# Patient Record
Sex: FEMALE | Race: WHITE | NOT HISPANIC OR LATINO | Employment: OTHER | URBAN - METROPOLITAN AREA
[De-identification: names, ages, dates, MRNs, and addresses within clinical notes are randomized per-mention and may not be internally consistent; named-entity substitution may affect disease eponyms.]

---

## 2018-05-22 ENCOUNTER — LAB VISIT (OUTPATIENT)
Dept: LAB | Facility: HOSPITAL | Age: 78
End: 2018-05-22
Attending: INTERNAL MEDICINE
Payer: MEDICARE

## 2018-05-22 DIAGNOSIS — I51.9 MYXEDEMA HEART DISEASE: ICD-10-CM

## 2018-05-22 DIAGNOSIS — E11.9 DIABETES MELLITUS WITHOUT COMPLICATION: Primary | ICD-10-CM

## 2018-05-22 DIAGNOSIS — E03.9 MYXEDEMA HEART DISEASE: ICD-10-CM

## 2018-05-22 DIAGNOSIS — I10 ESSENTIAL HYPERTENSION, MALIGNANT: ICD-10-CM

## 2018-05-22 LAB
ALBUMIN SERPL BCP-MCNC: 3.1 G/DL
ALP SERPL-CCNC: 95 U/L
ALT SERPL W/O P-5'-P-CCNC: 14 U/L
ANION GAP SERPL CALC-SCNC: 6 MMOL/L
AST SERPL-CCNC: 18 U/L
BILIRUB SERPL-MCNC: 0.3 MG/DL
BUN SERPL-MCNC: 16 MG/DL
CALCIUM SERPL-MCNC: 8.6 MG/DL
CHLORIDE SERPL-SCNC: 104 MMOL/L
CO2 SERPL-SCNC: 26 MMOL/L
CREAT SERPL-MCNC: 1.2 MG/DL
ERYTHROCYTE [DISTWIDTH] IN BLOOD BY AUTOMATED COUNT: 14.6 %
EST. GFR  (AFRICAN AMERICAN): 50.4 ML/MIN/1.73 M^2
EST. GFR  (NON AFRICAN AMERICAN): 43.7 ML/MIN/1.73 M^2
ESTIMATED AVG GLUCOSE: 140 MG/DL
GLUCOSE SERPL-MCNC: 199 MG/DL
HBA1C MFR BLD HPLC: 6.5 %
HCT VFR BLD AUTO: 39.8 %
HGB BLD-MCNC: 12.5 G/DL
MCH RBC QN AUTO: 28.9 PG
MCHC RBC AUTO-ENTMCNC: 31.4 G/DL
MCV RBC AUTO: 92 FL
PLATELET # BLD AUTO: 234 K/UL
PMV BLD AUTO: 10.8 FL
POTASSIUM SERPL-SCNC: 3.6 MMOL/L
PROT SERPL-MCNC: 6.6 G/DL
RBC # BLD AUTO: 4.32 M/UL
SODIUM SERPL-SCNC: 136 MMOL/L
T4 FREE SERPL-MCNC: 0.59 NG/DL
TSH SERPL DL<=0.005 MIU/L-ACNC: 30.88 UIU/ML
WBC # BLD AUTO: 9.14 K/UL

## 2018-05-22 PROCEDURE — 84439 ASSAY OF FREE THYROXINE: CPT

## 2018-05-22 PROCEDURE — 36415 COLL VENOUS BLD VENIPUNCTURE: CPT

## 2018-05-22 PROCEDURE — 85027 COMPLETE CBC AUTOMATED: CPT

## 2018-05-22 PROCEDURE — 83036 HEMOGLOBIN GLYCOSYLATED A1C: CPT

## 2018-05-22 PROCEDURE — 84443 ASSAY THYROID STIM HORMONE: CPT

## 2018-05-22 PROCEDURE — 80053 COMPREHEN METABOLIC PANEL: CPT

## 2018-06-26 ENCOUNTER — APPOINTMENT (OUTPATIENT)
Dept: LAB | Facility: HOSPITAL | Age: 78
End: 2018-06-26
Attending: INTERNAL MEDICINE
Payer: MEDICARE

## 2018-06-26 DIAGNOSIS — N39.0 URINARY TRACT INFECTION, SITE NOT SPECIFIED: Primary | ICD-10-CM

## 2018-06-26 LAB
BILIRUB UR QL STRIP: NEGATIVE
CLARITY UR: CLEAR
COLOR UR: YELLOW
GLUCOSE UR QL STRIP: NEGATIVE
HGB UR QL STRIP: NEGATIVE
KETONES UR QL STRIP: NEGATIVE
LEUKOCYTE ESTERASE UR QL STRIP: NEGATIVE
NITRITE UR QL STRIP: NEGATIVE
PH UR STRIP: 6 [PH] (ref 5–8)
PROT UR QL STRIP: NEGATIVE
SP GR UR STRIP: 1.01 (ref 1–1.03)
URN SPEC COLLECT METH UR: NORMAL
UROBILINOGEN UR STRIP-ACNC: NEGATIVE EU/DL

## 2018-06-26 PROCEDURE — 81003 URINALYSIS AUTO W/O SCOPE: CPT

## 2018-06-26 PROCEDURE — 87086 URINE CULTURE/COLONY COUNT: CPT

## 2018-06-27 LAB — BACTERIA UR CULT: NO GROWTH

## 2018-07-05 ENCOUNTER — LAB VISIT (OUTPATIENT)
Dept: LAB | Facility: HOSPITAL | Age: 78
End: 2018-07-05
Attending: INTERNAL MEDICINE
Payer: MEDICARE

## 2018-07-05 DIAGNOSIS — E03.9 MYXEDEMA HEART DISEASE: ICD-10-CM

## 2018-07-05 DIAGNOSIS — I51.9 MYXEDEMA HEART DISEASE: ICD-10-CM

## 2018-07-05 DIAGNOSIS — I48.0 PAROXYSMAL ATRIAL FIBRILLATION: Primary | ICD-10-CM

## 2018-07-05 DIAGNOSIS — I10 ESSENTIAL HYPERTENSION, MALIGNANT: ICD-10-CM

## 2018-07-05 LAB
ALBUMIN SERPL BCP-MCNC: 3.2 G/DL
ALP SERPL-CCNC: 92 U/L
ALT SERPL W/O P-5'-P-CCNC: 11 U/L
ANION GAP SERPL CALC-SCNC: 9 MMOL/L
AST SERPL-CCNC: 18 U/L
BILIRUB SERPL-MCNC: 0.6 MG/DL
BNP SERPL-MCNC: 55 PG/ML
BUN SERPL-MCNC: 17 MG/DL
CALCIUM SERPL-MCNC: 9.2 MG/DL
CHLORIDE SERPL-SCNC: 105 MMOL/L
CO2 SERPL-SCNC: 26 MMOL/L
CREAT SERPL-MCNC: 1.4 MG/DL
EST. GFR  (AFRICAN AMERICAN): 41.8 ML/MIN/1.73 M^2
EST. GFR  (NON AFRICAN AMERICAN): 36.3 ML/MIN/1.73 M^2
GLUCOSE SERPL-MCNC: 176 MG/DL
POTASSIUM SERPL-SCNC: 3.7 MMOL/L
PROT SERPL-MCNC: 6.8 G/DL
SODIUM SERPL-SCNC: 140 MMOL/L
T4 FREE SERPL-MCNC: 0.73 NG/DL
TSH SERPL DL<=0.005 MIU/L-ACNC: 41.71 UIU/ML

## 2018-07-05 PROCEDURE — 84443 ASSAY THYROID STIM HORMONE: CPT

## 2018-07-05 PROCEDURE — 83880 ASSAY OF NATRIURETIC PEPTIDE: CPT

## 2018-07-05 PROCEDURE — 36415 COLL VENOUS BLD VENIPUNCTURE: CPT

## 2018-07-05 PROCEDURE — 80053 COMPREHEN METABOLIC PANEL: CPT

## 2018-07-05 PROCEDURE — 84439 ASSAY OF FREE THYROXINE: CPT

## 2018-07-19 ENCOUNTER — LAB VISIT (OUTPATIENT)
Dept: LAB | Facility: HOSPITAL | Age: 78
End: 2018-07-19
Attending: NURSE PRACTITIONER
Payer: MEDICARE

## 2018-07-19 DIAGNOSIS — I48.0 PAROXYSMAL ATRIAL FIBRILLATION: ICD-10-CM

## 2018-07-19 DIAGNOSIS — I10 ESSENTIAL HYPERTENSION, MALIGNANT: ICD-10-CM

## 2018-07-19 DIAGNOSIS — E11.9 DIABETES MELLITUS WITHOUT COMPLICATION: Primary | ICD-10-CM

## 2018-07-19 LAB
ERYTHROCYTE [DISTWIDTH] IN BLOOD BY AUTOMATED COUNT: 15.5 %
HCT VFR BLD AUTO: 40 %
HGB BLD-MCNC: 12.6 G/DL
MCH RBC QN AUTO: 28.8 PG
MCHC RBC AUTO-ENTMCNC: 31.5 G/DL
MCV RBC AUTO: 91 FL
PLATELET # BLD AUTO: 234 K/UL
PMV BLD AUTO: 10.8 FL
RBC # BLD AUTO: 4.38 M/UL
WBC # BLD AUTO: 12.3 K/UL

## 2018-07-19 PROCEDURE — 36415 COLL VENOUS BLD VENIPUNCTURE: CPT

## 2018-07-19 PROCEDURE — 85027 COMPLETE CBC AUTOMATED: CPT

## 2018-08-23 ENCOUNTER — LAB VISIT (OUTPATIENT)
Dept: LAB | Facility: HOSPITAL | Age: 78
End: 2018-08-23
Attending: INTERNAL MEDICINE
Payer: MEDICARE

## 2018-08-23 DIAGNOSIS — E11.9 DIABETES MELLITUS WITHOUT COMPLICATION: ICD-10-CM

## 2018-08-23 DIAGNOSIS — E78.5 HYPERLIPEMIA: Primary | ICD-10-CM

## 2018-08-23 DIAGNOSIS — I10 ESSENTIAL HYPERTENSION, MALIGNANT: ICD-10-CM

## 2018-08-23 LAB
ALBUMIN SERPL BCP-MCNC: 2.8 G/DL
ALP SERPL-CCNC: 78 U/L
ALT SERPL W/O P-5'-P-CCNC: 10 U/L
ANION GAP SERPL CALC-SCNC: 5 MMOL/L
AST SERPL-CCNC: 14 U/L
BILIRUB DIRECT SERPL-MCNC: 0.1 MG/DL
BILIRUB SERPL-MCNC: 0.6 MG/DL
BUN SERPL-MCNC: 18 MG/DL
CALCIUM SERPL-MCNC: 8.6 MG/DL
CHLORIDE SERPL-SCNC: 105 MMOL/L
CHOLEST SERPL-MCNC: 131 MG/DL
CHOLEST/HDLC SERPL: 3.2 {RATIO}
CO2 SERPL-SCNC: 27 MMOL/L
CREAT SERPL-MCNC: 1.4 MG/DL
EST. GFR  (AFRICAN AMERICAN): 41.8 ML/MIN/1.73 M^2
EST. GFR  (NON AFRICAN AMERICAN): 36.3 ML/MIN/1.73 M^2
ESTIMATED AVG GLUCOSE: 140 MG/DL
GLUCOSE SERPL-MCNC: 97 MG/DL
HBA1C MFR BLD HPLC: 6.5 %
HDLC SERPL-MCNC: 41 MG/DL
HDLC SERPL: 31.3 %
LDLC SERPL CALC-MCNC: 67 MG/DL
NONHDLC SERPL-MCNC: 90 MG/DL
POTASSIUM SERPL-SCNC: 3.6 MMOL/L
PROT SERPL-MCNC: 6.1 G/DL
SODIUM SERPL-SCNC: 137 MMOL/L
TRIGL SERPL-MCNC: 115 MG/DL

## 2018-08-23 PROCEDURE — 80076 HEPATIC FUNCTION PANEL: CPT

## 2018-08-23 PROCEDURE — 36415 COLL VENOUS BLD VENIPUNCTURE: CPT

## 2018-08-23 PROCEDURE — 80061 LIPID PANEL: CPT

## 2018-08-23 PROCEDURE — 83036 HEMOGLOBIN GLYCOSYLATED A1C: CPT

## 2018-08-23 PROCEDURE — 80048 BASIC METABOLIC PNL TOTAL CA: CPT

## 2018-09-04 ENCOUNTER — APPOINTMENT (OUTPATIENT)
Dept: LAB | Facility: HOSPITAL | Age: 78
End: 2018-09-04
Attending: NURSE PRACTITIONER
Payer: MEDICARE

## 2018-09-04 LAB — VIT B12 SERPL-MCNC: 716 PG/ML

## 2018-09-04 PROCEDURE — 36415 COLL VENOUS BLD VENIPUNCTURE: CPT

## 2018-09-04 PROCEDURE — 82607 VITAMIN B-12: CPT

## 2018-09-06 ENCOUNTER — LAB VISIT (OUTPATIENT)
Dept: LAB | Facility: HOSPITAL | Age: 78
End: 2018-09-06
Attending: INTERNAL MEDICINE
Payer: MEDICARE

## 2018-09-06 DIAGNOSIS — E55.9 AVITAMINOSIS D: Primary | ICD-10-CM

## 2018-09-06 LAB — 25(OH)D3+25(OH)D2 SERPL-MCNC: 32 NG/ML

## 2018-09-06 PROCEDURE — 82306 VITAMIN D 25 HYDROXY: CPT

## 2018-09-06 PROCEDURE — 36415 COLL VENOUS BLD VENIPUNCTURE: CPT

## 2018-10-09 ENCOUNTER — LAB VISIT (OUTPATIENT)
Dept: LAB | Facility: HOSPITAL | Age: 78
End: 2018-10-09
Attending: INTERNAL MEDICINE
Payer: MEDICARE

## 2018-10-09 DIAGNOSIS — E03.9 MYXEDEMA HEART DISEASE: Primary | ICD-10-CM

## 2018-10-09 DIAGNOSIS — I51.9 MYXEDEMA HEART DISEASE: Primary | ICD-10-CM

## 2018-10-09 LAB
T3 SERPL-MCNC: 73 NG/DL
T4 FREE SERPL-MCNC: 0.99 NG/DL
TSH SERPL DL<=0.005 MIU/L-ACNC: 5.31 UIU/ML

## 2018-10-09 PROCEDURE — 84443 ASSAY THYROID STIM HORMONE: CPT

## 2018-10-09 PROCEDURE — 84480 ASSAY TRIIODOTHYRONINE (T3): CPT

## 2018-10-09 PROCEDURE — 36415 COLL VENOUS BLD VENIPUNCTURE: CPT

## 2018-10-09 PROCEDURE — 84439 ASSAY OF FREE THYROXINE: CPT

## 2018-11-27 ENCOUNTER — LAB VISIT (OUTPATIENT)
Dept: LAB | Facility: HOSPITAL | Age: 78
End: 2018-11-27
Attending: INTERNAL MEDICINE
Payer: MEDICARE

## 2018-11-27 DIAGNOSIS — E11.9 DIABETES MELLITUS WITHOUT COMPLICATION: Primary | ICD-10-CM

## 2018-11-27 DIAGNOSIS — I10 ESSENTIAL HYPERTENSION, MALIGNANT: ICD-10-CM

## 2018-11-27 LAB
ANION GAP SERPL CALC-SCNC: 8 MMOL/L
BUN SERPL-MCNC: 19 MG/DL
CALCIUM SERPL-MCNC: 8.9 MG/DL
CHLORIDE SERPL-SCNC: 105 MMOL/L
CO2 SERPL-SCNC: 26 MMOL/L
CREAT SERPL-MCNC: 1.2 MG/DL
EST. GFR  (AFRICAN AMERICAN): 50.4 ML/MIN/1.73 M^2
EST. GFR  (NON AFRICAN AMERICAN): 43.7 ML/MIN/1.73 M^2
ESTIMATED AVG GLUCOSE: 137 MG/DL
GLUCOSE SERPL-MCNC: 114 MG/DL
HBA1C MFR BLD HPLC: 6.4 %
POTASSIUM SERPL-SCNC: 4 MMOL/L
SODIUM SERPL-SCNC: 139 MMOL/L

## 2018-11-27 PROCEDURE — 36415 COLL VENOUS BLD VENIPUNCTURE: CPT

## 2018-11-27 PROCEDURE — 83036 HEMOGLOBIN GLYCOSYLATED A1C: CPT

## 2018-11-27 PROCEDURE — 80048 BASIC METABOLIC PNL TOTAL CA: CPT

## 2019-02-21 ENCOUNTER — LAB VISIT (OUTPATIENT)
Dept: LAB | Facility: HOSPITAL | Age: 79
End: 2019-02-21
Attending: INTERNAL MEDICINE
Payer: MEDICARE

## 2019-02-21 DIAGNOSIS — E11.40 DIABETIC NEUROPATHY: ICD-10-CM

## 2019-02-21 DIAGNOSIS — I10 ESSENTIAL HYPERTENSION, MALIGNANT: ICD-10-CM

## 2019-02-21 DIAGNOSIS — E78.5 HYPERLIPEMIA: ICD-10-CM

## 2019-02-21 DIAGNOSIS — I48.0 PAROXYSMAL ATRIAL FIBRILLATION: Primary | ICD-10-CM

## 2019-02-21 LAB
ALBUMIN SERPL BCP-MCNC: 3.1 G/DL
ALP SERPL-CCNC: 89 U/L
ALT SERPL W/O P-5'-P-CCNC: 14 U/L
ANION GAP SERPL CALC-SCNC: 12 MMOL/L
AST SERPL-CCNC: 16 U/L
BILIRUB DIRECT SERPL-MCNC: 0.1 MG/DL
BILIRUB SERPL-MCNC: 0.5 MG/DL
BUN SERPL-MCNC: 20 MG/DL
CALCIUM SERPL-MCNC: 8.6 MG/DL
CHLORIDE SERPL-SCNC: 104 MMOL/L
CHOLEST SERPL-MCNC: 146 MG/DL
CHOLEST/HDLC SERPL: 3.4 {RATIO}
CO2 SERPL-SCNC: 24 MMOL/L
CREAT SERPL-MCNC: 1.2 MG/DL
EST. GFR  (AFRICAN AMERICAN): 50.4 ML/MIN/1.73 M^2
EST. GFR  (NON AFRICAN AMERICAN): 43.7 ML/MIN/1.73 M^2
ESTIMATED AVG GLUCOSE: 143 MG/DL
GLUCOSE SERPL-MCNC: 168 MG/DL
HBA1C MFR BLD HPLC: 6.6 %
HDLC SERPL-MCNC: 43 MG/DL
HDLC SERPL: 29.5 %
LDLC SERPL CALC-MCNC: 82.4 MG/DL
NONHDLC SERPL-MCNC: 103 MG/DL
POTASSIUM SERPL-SCNC: 3.3 MMOL/L
PROT SERPL-MCNC: 6.8 G/DL
SODIUM SERPL-SCNC: 140 MMOL/L
TRIGL SERPL-MCNC: 103 MG/DL

## 2019-02-21 PROCEDURE — 83036 HEMOGLOBIN GLYCOSYLATED A1C: CPT

## 2019-02-21 PROCEDURE — 36415 COLL VENOUS BLD VENIPUNCTURE: CPT

## 2019-02-21 PROCEDURE — 80061 LIPID PANEL: CPT

## 2019-02-21 PROCEDURE — 80048 BASIC METABOLIC PNL TOTAL CA: CPT

## 2019-02-21 PROCEDURE — 80076 HEPATIC FUNCTION PANEL: CPT

## 2019-02-28 ENCOUNTER — LAB VISIT (OUTPATIENT)
Dept: LAB | Facility: HOSPITAL | Age: 79
End: 2019-02-28
Attending: INTERNAL MEDICINE
Payer: MEDICARE

## 2019-02-28 DIAGNOSIS — E78.6 FAMILIAL LIPOPROTEIN DEFICIENCY: Primary | ICD-10-CM

## 2019-02-28 LAB
ANION GAP SERPL CALC-SCNC: 9 MMOL/L
BUN SERPL-MCNC: 21 MG/DL
CALCIUM SERPL-MCNC: 8.8 MG/DL
CHLORIDE SERPL-SCNC: 105 MMOL/L
CO2 SERPL-SCNC: 26 MMOL/L
CREAT SERPL-MCNC: 1.2 MG/DL
EST. GFR  (AFRICAN AMERICAN): 50.4 ML/MIN/1.73 M^2
EST. GFR  (NON AFRICAN AMERICAN): 43.7 ML/MIN/1.73 M^2
GLUCOSE SERPL-MCNC: 112 MG/DL
POTASSIUM SERPL-SCNC: 3.7 MMOL/L
SODIUM SERPL-SCNC: 140 MMOL/L

## 2019-02-28 PROCEDURE — 80048 BASIC METABOLIC PNL TOTAL CA: CPT

## 2019-02-28 PROCEDURE — 36415 COLL VENOUS BLD VENIPUNCTURE: CPT

## 2019-04-16 ENCOUNTER — LAB VISIT (OUTPATIENT)
Dept: LAB | Facility: HOSPITAL | Age: 79
End: 2019-04-16
Attending: INTERNAL MEDICINE
Payer: MEDICARE

## 2019-04-16 DIAGNOSIS — I48.0 PAROXYSMAL ATRIAL FIBRILLATION: Primary | ICD-10-CM

## 2019-04-16 DIAGNOSIS — N18.9 CHRONIC KIDNEY DISEASE, UNSPECIFIED: ICD-10-CM

## 2019-04-16 DIAGNOSIS — M48.02 SPINAL STENOSIS IN CERVICAL REGION: ICD-10-CM

## 2019-04-16 LAB
ALBUMIN SERPL BCP-MCNC: 2.9 G/DL (ref 3.5–5.2)
ALP SERPL-CCNC: 84 U/L (ref 55–135)
ALT SERPL W/O P-5'-P-CCNC: 12 U/L (ref 10–44)
ANION GAP SERPL CALC-SCNC: 10 MMOL/L (ref 8–16)
AST SERPL-CCNC: 14 U/L (ref 10–40)
BASOPHILS # BLD AUTO: 0.06 K/UL (ref 0–0.2)
BASOPHILS NFR BLD: 0.7 % (ref 0–1.9)
BILIRUB SERPL-MCNC: 0.4 MG/DL (ref 0.1–1)
BUN SERPL-MCNC: 16 MG/DL (ref 8–23)
CALCIUM SERPL-MCNC: 8.5 MG/DL (ref 8.7–10.5)
CHLORIDE SERPL-SCNC: 105 MMOL/L (ref 95–110)
CO2 SERPL-SCNC: 24 MMOL/L (ref 23–29)
CREAT SERPL-MCNC: 1.1 MG/DL (ref 0.5–1.4)
DIFFERENTIAL METHOD: ABNORMAL
EOSINOPHIL # BLD AUTO: 0.3 K/UL (ref 0–0.5)
EOSINOPHIL NFR BLD: 3.1 % (ref 0–8)
ERYTHROCYTE [DISTWIDTH] IN BLOOD BY AUTOMATED COUNT: 14 % (ref 11.5–14.5)
EST. GFR  (AFRICAN AMERICAN): 56 ML/MIN/1.73 M^2
EST. GFR  (NON AFRICAN AMERICAN): 48.5 ML/MIN/1.73 M^2
GLUCOSE SERPL-MCNC: 100 MG/DL (ref 70–110)
HCT VFR BLD AUTO: 39.8 % (ref 37–48.5)
HGB BLD-MCNC: 12.2 G/DL (ref 12–16)
IMM GRANULOCYTES # BLD AUTO: 0.03 K/UL (ref 0–0.04)
IMM GRANULOCYTES NFR BLD AUTO: 0.3 % (ref 0–0.5)
LYMPHOCYTES # BLD AUTO: 3.1 K/UL (ref 1–4.8)
LYMPHOCYTES NFR BLD: 33.3 % (ref 18–48)
MAGNESIUM SERPL-MCNC: 2.1 MG/DL (ref 1.6–2.6)
MCH RBC QN AUTO: 28.6 PG (ref 27–31)
MCHC RBC AUTO-ENTMCNC: 30.7 G/DL (ref 32–36)
MCV RBC AUTO: 93 FL (ref 82–98)
MONOCYTES # BLD AUTO: 0.7 K/UL (ref 0.3–1)
MONOCYTES NFR BLD: 7.7 % (ref 4–15)
NEUTROPHILS # BLD AUTO: 5 K/UL (ref 1.8–7.7)
NEUTROPHILS NFR BLD: 54.9 % (ref 38–73)
NRBC BLD-RTO: 0 /100 WBC
PLATELET # BLD AUTO: 217 K/UL (ref 150–350)
PMV BLD AUTO: 11.1 FL (ref 9.2–12.9)
POTASSIUM SERPL-SCNC: 3.8 MMOL/L (ref 3.5–5.1)
PROT SERPL-MCNC: 6.3 G/DL (ref 6–8.4)
RBC # BLD AUTO: 4.26 M/UL (ref 4–5.4)
SODIUM SERPL-SCNC: 139 MMOL/L (ref 136–145)
WBC # BLD AUTO: 9.17 K/UL (ref 3.9–12.7)

## 2019-04-16 PROCEDURE — 36415 COLL VENOUS BLD VENIPUNCTURE: CPT

## 2019-04-16 PROCEDURE — 80053 COMPREHEN METABOLIC PANEL: CPT

## 2019-04-16 PROCEDURE — 83735 ASSAY OF MAGNESIUM: CPT

## 2019-04-16 PROCEDURE — 85025 COMPLETE CBC W/AUTO DIFF WBC: CPT

## 2019-04-23 ENCOUNTER — LAB VISIT (OUTPATIENT)
Dept: LAB | Facility: HOSPITAL | Age: 79
End: 2019-04-23
Attending: INTERNAL MEDICINE
Payer: MEDICARE

## 2019-04-23 DIAGNOSIS — E03.1 CONGENITAL HYPOTHYROIDISM: Primary | ICD-10-CM

## 2019-04-23 LAB
T4 FREE SERPL-MCNC: 1.25 NG/DL (ref 0.71–1.51)
TSH SERPL DL<=0.005 MIU/L-ACNC: 0.28 UIU/ML (ref 0.34–5.6)

## 2019-04-23 PROCEDURE — 84443 ASSAY THYROID STIM HORMONE: CPT

## 2019-04-23 PROCEDURE — 36415 COLL VENOUS BLD VENIPUNCTURE: CPT

## 2019-04-23 PROCEDURE — 84439 ASSAY OF FREE THYROXINE: CPT

## 2019-05-09 ENCOUNTER — LAB VISIT (OUTPATIENT)
Dept: LAB | Facility: HOSPITAL | Age: 79
End: 2019-05-09
Attending: INTERNAL MEDICINE
Payer: MEDICARE

## 2019-05-09 DIAGNOSIS — E11.9 DIABETES MELLITUS WITHOUT COMPLICATION: ICD-10-CM

## 2019-05-09 DIAGNOSIS — I10 ESSENTIAL HYPERTENSION, MALIGNANT: Primary | ICD-10-CM

## 2019-05-09 DIAGNOSIS — D63.8 CHRONIC DISEASE ANEMIA: ICD-10-CM

## 2019-05-09 LAB
ALBUMIN SERPL BCP-MCNC: 2.8 G/DL (ref 3.5–5.2)
ALP SERPL-CCNC: 96 U/L (ref 55–135)
ALT SERPL W/O P-5'-P-CCNC: 15 U/L (ref 10–44)
ANION GAP SERPL CALC-SCNC: 8 MMOL/L (ref 8–16)
AST SERPL-CCNC: 14 U/L (ref 10–40)
BILIRUB SERPL-MCNC: 0.4 MG/DL (ref 0.1–1)
BUN SERPL-MCNC: 11 MG/DL (ref 8–23)
CALCIUM SERPL-MCNC: 8.4 MG/DL (ref 8.7–10.5)
CHLORIDE SERPL-SCNC: 103 MMOL/L (ref 95–110)
CO2 SERPL-SCNC: 29 MMOL/L (ref 23–29)
CREAT SERPL-MCNC: 1.2 MG/DL (ref 0.5–1.4)
ERYTHROCYTE [DISTWIDTH] IN BLOOD BY AUTOMATED COUNT: 14 % (ref 11.5–14.5)
EST. GFR  (AFRICAN AMERICAN): 50.4 ML/MIN/1.73 M^2
EST. GFR  (NON AFRICAN AMERICAN): 43.7 ML/MIN/1.73 M^2
ESTIMATED AVG GLUCOSE: 137 MG/DL (ref 68–131)
GLUCOSE SERPL-MCNC: 111 MG/DL (ref 70–110)
HBA1C MFR BLD HPLC: 6.4 % (ref 4.5–6.2)
HCT VFR BLD AUTO: 40.4 % (ref 37–48.5)
HGB BLD-MCNC: 12.5 G/DL (ref 12–16)
MCH RBC QN AUTO: 28.6 PG (ref 27–31)
MCHC RBC AUTO-ENTMCNC: 30.9 G/DL (ref 32–36)
MCV RBC AUTO: 92 FL (ref 82–98)
PLATELET # BLD AUTO: 234 K/UL (ref 150–350)
PMV BLD AUTO: 10.6 FL (ref 9.2–12.9)
POTASSIUM SERPL-SCNC: 3.3 MMOL/L (ref 3.5–5.1)
PROT SERPL-MCNC: 6.3 G/DL (ref 6–8.4)
RBC # BLD AUTO: 4.37 M/UL (ref 4–5.4)
SODIUM SERPL-SCNC: 140 MMOL/L (ref 136–145)
WBC # BLD AUTO: 8.76 K/UL (ref 3.9–12.7)

## 2019-05-09 PROCEDURE — 85027 COMPLETE CBC AUTOMATED: CPT

## 2019-05-09 PROCEDURE — 80053 COMPREHEN METABOLIC PANEL: CPT

## 2019-05-09 PROCEDURE — 83036 HEMOGLOBIN GLYCOSYLATED A1C: CPT

## 2019-05-09 PROCEDURE — 36415 COLL VENOUS BLD VENIPUNCTURE: CPT

## 2019-05-16 ENCOUNTER — LAB VISIT (OUTPATIENT)
Dept: LAB | Facility: HOSPITAL | Age: 79
End: 2019-05-16
Attending: NURSE PRACTITIONER
Payer: MEDICARE

## 2019-05-16 DIAGNOSIS — E87.1 HYPOSMOLALITY SYNDROME: Primary | ICD-10-CM

## 2019-05-16 LAB
ANION GAP SERPL CALC-SCNC: 6 MMOL/L (ref 8–16)
BUN SERPL-MCNC: 14 MG/DL (ref 8–23)
CALCIUM SERPL-MCNC: 8.2 MG/DL (ref 8.7–10.5)
CHLORIDE SERPL-SCNC: 106 MMOL/L (ref 95–110)
CO2 SERPL-SCNC: 25 MMOL/L (ref 23–29)
CREAT SERPL-MCNC: 1.1 MG/DL (ref 0.5–1.4)
EST. GFR  (AFRICAN AMERICAN): 55.6 ML/MIN/1.73 M^2
EST. GFR  (NON AFRICAN AMERICAN): 48.2 ML/MIN/1.73 M^2
GLUCOSE SERPL-MCNC: 111 MG/DL (ref 70–110)
POTASSIUM SERPL-SCNC: 3.6 MMOL/L (ref 3.5–5.1)
SODIUM SERPL-SCNC: 137 MMOL/L (ref 136–145)

## 2019-05-16 PROCEDURE — 36415 COLL VENOUS BLD VENIPUNCTURE: CPT

## 2019-05-16 PROCEDURE — 80048 BASIC METABOLIC PNL TOTAL CA: CPT

## 2019-08-29 ENCOUNTER — LAB VISIT (OUTPATIENT)
Dept: LAB | Facility: HOSPITAL | Age: 79
End: 2019-08-29
Attending: INTERNAL MEDICINE
Payer: MEDICARE

## 2019-08-29 DIAGNOSIS — N18.9 CHRONIC KIDNEY DISEASE, UNSPECIFIED: ICD-10-CM

## 2019-08-29 DIAGNOSIS — I10 ESSENTIAL HYPERTENSION, MALIGNANT: ICD-10-CM

## 2019-08-29 DIAGNOSIS — E11.9 DIABETES MELLITUS WITHOUT COMPLICATION: Primary | ICD-10-CM

## 2019-08-29 DIAGNOSIS — D63.8 CHRONIC DISEASE ANEMIA: ICD-10-CM

## 2019-08-29 DIAGNOSIS — E53.8 VITAMIN B12 DEFICIENCY: ICD-10-CM

## 2019-08-29 LAB
ANION GAP SERPL CALC-SCNC: 10 MMOL/L (ref 8–16)
BILIRUB DIRECT SERPL-MCNC: 0.1 MG/DL (ref 0.1–0.3)
BUN SERPL-MCNC: 16 MG/DL (ref 8–23)
CALCIUM SERPL-MCNC: 8.4 MG/DL (ref 8.7–10.5)
CHLORIDE SERPL-SCNC: 109 MMOL/L (ref 95–110)
CHOLEST SERPL-MCNC: 121 MG/DL (ref 120–199)
CHOLEST/HDLC SERPL: 3.4 {RATIO} (ref 2–5)
CO2 SERPL-SCNC: 21 MMOL/L (ref 23–29)
CREAT SERPL-MCNC: 1 MG/DL (ref 0.5–1.4)
EST. GFR  (AFRICAN AMERICAN): >60 ML/MIN/1.73 M^2
EST. GFR  (NON AFRICAN AMERICAN): 54.1 ML/MIN/1.73 M^2
ESTIMATED AVG GLUCOSE: 128 MG/DL (ref 68–131)
GLUCOSE SERPL-MCNC: 111 MG/DL (ref 70–110)
HBA1C MFR BLD HPLC: 6.1 % (ref 4.5–6.2)
HDLC SERPL-MCNC: 36 MG/DL (ref 40–75)
HDLC SERPL: 29.8 % (ref 20–50)
LDLC SERPL CALC-MCNC: 63.8 MG/DL (ref 63–159)
NONHDLC SERPL-MCNC: 85 MG/DL
POTASSIUM SERPL-SCNC: 3.7 MMOL/L (ref 3.5–5.1)
SODIUM SERPL-SCNC: 140 MMOL/L (ref 136–145)
TRIGL SERPL-MCNC: 106 MG/DL (ref 30–150)
VIT B12 SERPL-MCNC: 803 PG/ML (ref 210–950)

## 2019-08-29 PROCEDURE — 83036 HEMOGLOBIN GLYCOSYLATED A1C: CPT

## 2019-08-29 PROCEDURE — 36415 COLL VENOUS BLD VENIPUNCTURE: CPT

## 2019-08-29 PROCEDURE — 80061 LIPID PANEL: CPT

## 2019-08-29 PROCEDURE — 80048 BASIC METABOLIC PNL TOTAL CA: CPT

## 2019-08-29 PROCEDURE — 80076 HEPATIC FUNCTION PANEL: CPT

## 2019-08-29 PROCEDURE — 82607 VITAMIN B-12: CPT

## 2019-08-30 LAB
ALBUMIN SERPL BCP-MCNC: 2.8 G/DL (ref 3.5–5.2)
ALP SERPL-CCNC: 80 U/L (ref 55–135)
ALT SERPL W/O P-5'-P-CCNC: 13 U/L (ref 10–44)
AST SERPL-CCNC: 15 U/L (ref 10–40)
BILIRUB DIRECT SERPL-MCNC: 0.1 MG/DL (ref 0.1–0.3)
BILIRUB SERPL-MCNC: 0.5 MG/DL (ref 0.1–1)
PROT SERPL-MCNC: 6.2 G/DL (ref 6–8.4)

## 2019-10-05 ENCOUNTER — HOSPITAL ENCOUNTER (INPATIENT)
Facility: HOSPITAL | Age: 79
LOS: 2 days | Discharge: HOME | DRG: 247 | End: 2019-10-08
Attending: EMERGENCY MEDICINE | Admitting: INTERNAL MEDICINE
Payer: MEDICARE

## 2019-10-05 ENCOUNTER — HOSPITAL ENCOUNTER (EMERGENCY)
Facility: HOSPITAL | Age: 79
Discharge: ANOTHER HOSPITAL | End: 2019-10-05
Attending: INTERNAL MEDICINE
Payer: MEDICARE

## 2019-10-05 VITALS
TEMPERATURE: 98 F | WEIGHT: 239.63 LBS | SYSTOLIC BLOOD PRESSURE: 132 MMHG | DIASTOLIC BLOOD PRESSURE: 57 MMHG | HEART RATE: 66 BPM | OXYGEN SATURATION: 93 % | BODY MASS INDEX: 39.92 KG/M2 | HEIGHT: 65 IN | RESPIRATION RATE: 16 BRPM

## 2019-10-05 DIAGNOSIS — R07.9 CHEST PAIN, UNSPECIFIED TYPE: ICD-10-CM

## 2019-10-05 DIAGNOSIS — I21.19: ICD-10-CM

## 2019-10-05 DIAGNOSIS — I21.4 NSTEMI (NON-ST ELEVATED MYOCARDIAL INFARCTION): Primary | ICD-10-CM

## 2019-10-05 DIAGNOSIS — R07.9 CHEST PAIN: ICD-10-CM

## 2019-10-05 DIAGNOSIS — I21.4 NON-STEMI (NON-ST ELEVATED MYOCARDIAL INFARCTION): Primary | ICD-10-CM

## 2019-10-05 PROBLEM — F03.918 DEMENTIA WITH BEHAVIORAL DISTURBANCE: Status: ACTIVE | Noted: 2019-10-05

## 2019-10-05 PROBLEM — E11.9 DIABETES MELLITUS, TYPE 2: Status: ACTIVE | Noted: 2019-10-05

## 2019-10-05 PROBLEM — I10 HTN (HYPERTENSION): Status: ACTIVE | Noted: 2019-10-05

## 2019-10-05 PROBLEM — E78.5 HYPERLIPIDEMIA: Status: ACTIVE | Noted: 2019-10-05

## 2019-10-05 PROBLEM — I48.20 CHRONIC A-FIB: Status: ACTIVE | Noted: 2019-10-05

## 2019-10-05 PROBLEM — I50.9 CHF (CONGESTIVE HEART FAILURE): Status: ACTIVE | Noted: 2019-10-05

## 2019-10-05 PROBLEM — J44.9 COPD (CHRONIC OBSTRUCTIVE PULMONARY DISEASE): Status: ACTIVE | Noted: 2019-10-05

## 2019-10-05 PROBLEM — I25.10 CORONARY ARTERY DISEASE: Status: ACTIVE | Noted: 2019-10-05

## 2019-10-05 LAB
ALBUMIN SERPL BCP-MCNC: 3.5 G/DL (ref 3.5–5.2)
ALP SERPL-CCNC: 108 U/L (ref 55–135)
ALT SERPL W/O P-5'-P-CCNC: 20 U/L (ref 10–44)
ANION GAP SERPL CALC-SCNC: 12 MMOL/L (ref 8–16)
AST SERPL-CCNC: 25 U/L (ref 10–40)
BACTERIA #/AREA URNS HPF: NORMAL /HPF
BASOPHILS # BLD AUTO: 0.06 K/UL (ref 0–0.2)
BASOPHILS NFR BLD: 0.5 % (ref 0–1.9)
BILIRUB SERPL-MCNC: 0.7 MG/DL (ref 0.1–1)
BILIRUB UR QL STRIP: NEGATIVE
BNP SERPL-MCNC: 218 PG/ML (ref 0–99)
BUN SERPL-MCNC: 13 MG/DL (ref 8–23)
CALCIUM SERPL-MCNC: 9.1 MG/DL (ref 8.7–10.5)
CHLORIDE SERPL-SCNC: 102 MMOL/L (ref 95–110)
CLARITY UR: CLEAR
CO2 SERPL-SCNC: 23 MMOL/L (ref 23–29)
COLOR UR: YELLOW
CREAT SERPL-MCNC: 1.2 MG/DL (ref 0.5–1.4)
DIFFERENTIAL METHOD: NORMAL
EOSINOPHIL # BLD AUTO: 0.2 K/UL (ref 0–0.5)
EOSINOPHIL NFR BLD: 1.7 % (ref 0–8)
ERYTHROCYTE [DISTWIDTH] IN BLOOD BY AUTOMATED COUNT: 14.5 % (ref 11.5–14.5)
EST. GFR  (AFRICAN AMERICAN): 50 ML/MIN/1.73 M^2
EST. GFR  (NON AFRICAN AMERICAN): 43.4 ML/MIN/1.73 M^2
GLUCOSE SERPL-MCNC: 125 MG/DL (ref 70–110)
GLUCOSE SERPL-MCNC: 184 MG/DL (ref 70–110)
GLUCOSE UR QL STRIP: NEGATIVE
HCT VFR BLD AUTO: 45.2 % (ref 37–48.5)
HGB BLD-MCNC: 14.5 G/DL (ref 12–16)
HGB UR QL STRIP: NEGATIVE
IMM GRANULOCYTES # BLD AUTO: 0.03 K/UL (ref 0–0.04)
IMM GRANULOCYTES NFR BLD AUTO: 0.3 % (ref 0–0.5)
KETONES UR QL STRIP: NEGATIVE
LEUKOCYTE ESTERASE UR QL STRIP: ABNORMAL
LYMPHOCYTES # BLD AUTO: 3.6 K/UL (ref 1–4.8)
LYMPHOCYTES NFR BLD: 29.6 % (ref 18–48)
MAGNESIUM SERPL-MCNC: 2.3 MG/DL (ref 1.6–2.6)
MCH RBC QN AUTO: 28.5 PG (ref 27–31)
MCHC RBC AUTO-ENTMCNC: 32.1 G/DL (ref 32–36)
MCV RBC AUTO: 89 FL (ref 82–98)
MICROSCOPIC COMMENT: NORMAL
MONOCYTES # BLD AUTO: 0.7 K/UL (ref 0.3–1)
MONOCYTES NFR BLD: 6.1 % (ref 4–15)
NEUTROPHILS # BLD AUTO: 7.4 K/UL (ref 1.8–7.7)
NEUTROPHILS NFR BLD: 61.8 % (ref 38–73)
NITRITE UR QL STRIP: NEGATIVE
NRBC BLD-RTO: 0 /100 WBC
PH UR STRIP: 7 [PH] (ref 5–8)
PLATELET # BLD AUTO: 278 K/UL (ref 150–350)
PMV BLD AUTO: 10.2 FL (ref 9.2–12.9)
POTASSIUM SERPL-SCNC: 3.8 MMOL/L (ref 3.5–5.1)
PROT SERPL-MCNC: 7.4 G/DL (ref 6–8.4)
PROT UR QL STRIP: NEGATIVE
RBC # BLD AUTO: 5.09 M/UL (ref 4–5.4)
SODIUM SERPL-SCNC: 137 MMOL/L (ref 136–145)
SP GR UR STRIP: 1.01 (ref 1–1.03)
SQUAMOUS #/AREA URNS HPF: 2 /HPF
TROPONIN I SERPL DL<=0.01 NG/ML-MCNC: 0.21 NG/ML (ref 0.02–0.5)
TROPONIN I SERPL DL<=0.01 NG/ML-MCNC: 0.76 NG/ML (ref 0.02–0.5)
TROPONIN I SERPL DL<=0.01 NG/ML-MCNC: 2.34 NG/ML (ref 0.02–0.04)
URN SPEC COLLECT METH UR: ABNORMAL
UROBILINOGEN UR STRIP-ACNC: NEGATIVE EU/DL
WBC # BLD AUTO: 11.99 K/UL (ref 3.9–12.7)
WBC #/AREA URNS HPF: 3 /HPF (ref 0–5)

## 2019-10-05 PROCEDURE — 80053 COMPREHEN METABOLIC PANEL: CPT

## 2019-10-05 PROCEDURE — 71045 XR CHEST AP PORTABLE: ICD-10-PCS | Mod: 26,,, | Performed by: RADIOLOGY

## 2019-10-05 PROCEDURE — 85025 COMPLETE CBC W/AUTO DIFF WBC: CPT

## 2019-10-05 PROCEDURE — 71045 X-RAY EXAM CHEST 1 VIEW: CPT | Mod: TC,FY

## 2019-10-05 PROCEDURE — 25000003 PHARM REV CODE 250: Performed by: INTERNAL MEDICINE

## 2019-10-05 PROCEDURE — 71045 X-RAY EXAM CHEST 1 VIEW: CPT | Mod: 26,,, | Performed by: RADIOLOGY

## 2019-10-05 PROCEDURE — 63600175 PHARM REV CODE 636 W HCPCS: Performed by: INTERNAL MEDICINE

## 2019-10-05 PROCEDURE — 83880 ASSAY OF NATRIURETIC PEPTIDE: CPT

## 2019-10-05 PROCEDURE — 96375 TX/PRO/DX INJ NEW DRUG ADDON: CPT

## 2019-10-05 PROCEDURE — 36415 COLL VENOUS BLD VENIPUNCTURE: CPT

## 2019-10-05 PROCEDURE — 83735 ASSAY OF MAGNESIUM: CPT

## 2019-10-05 PROCEDURE — 99285 EMERGENCY DEPT VISIT HI MDM: CPT | Mod: 25

## 2019-10-05 PROCEDURE — 93005 ELECTROCARDIOGRAM TRACING: CPT

## 2019-10-05 PROCEDURE — 81000 URINALYSIS NONAUTO W/SCOPE: CPT

## 2019-10-05 PROCEDURE — G0378 HOSPITAL OBSERVATION PER HR: HCPCS

## 2019-10-05 PROCEDURE — 93010 ELECTROCARDIOGRAM REPORT: CPT | Mod: ,,, | Performed by: INTERNAL MEDICINE

## 2019-10-05 PROCEDURE — 96376 TX/PRO/DX INJ SAME DRUG ADON: CPT

## 2019-10-05 PROCEDURE — 96374 THER/PROPH/DIAG INJ IV PUSH: CPT

## 2019-10-05 PROCEDURE — 84484 ASSAY OF TROPONIN QUANT: CPT | Mod: 91

## 2019-10-05 PROCEDURE — 84484 ASSAY OF TROPONIN QUANT: CPT

## 2019-10-05 PROCEDURE — 93010 EKG 12-LEAD: ICD-10-PCS | Mod: ,,, | Performed by: INTERNAL MEDICINE

## 2019-10-05 RX ORDER — LORATADINE 10 MG/1
10 TABLET ORAL DAILY PRN
COMMUNITY

## 2019-10-05 RX ORDER — MORPHINE SULFATE 4 MG/ML
2 INJECTION, SOLUTION INTRAMUSCULAR; INTRAVENOUS
Status: COMPLETED | OUTPATIENT
Start: 2019-10-05 | End: 2019-10-05

## 2019-10-05 RX ORDER — ACETAMINOPHEN 325 MG/1
650 TABLET ORAL EVERY 4 HOURS PRN
Status: DISCONTINUED | OUTPATIENT
Start: 2019-10-05 | End: 2019-10-08 | Stop reason: HOSPADM

## 2019-10-05 RX ORDER — LEVOTHYROXINE SODIUM 125 UG/1
125 TABLET ORAL
COMMUNITY

## 2019-10-05 RX ORDER — CETIRIZINE HYDROCHLORIDE 10 MG/1
10 TABLET ORAL DAILY
Status: DISCONTINUED | OUTPATIENT
Start: 2019-10-06 | End: 2019-10-08 | Stop reason: HOSPADM

## 2019-10-05 RX ORDER — ESCITALOPRAM OXALATE 10 MG/1
20 TABLET ORAL DAILY
Status: DISCONTINUED | OUTPATIENT
Start: 2019-10-06 | End: 2019-10-08 | Stop reason: HOSPADM

## 2019-10-05 RX ORDER — DONEPEZIL HYDROCHLORIDE 10 MG/1
10 TABLET, FILM COATED ORAL NIGHTLY
COMMUNITY

## 2019-10-05 RX ORDER — ONDANSETRON 2 MG/ML
8 INJECTION INTRAMUSCULAR; INTRAVENOUS
Status: COMPLETED | OUTPATIENT
Start: 2019-10-05 | End: 2019-10-05

## 2019-10-05 RX ORDER — TORSEMIDE 20 MG/1
20 TABLET ORAL DAILY
Status: DISCONTINUED | OUTPATIENT
Start: 2019-10-06 | End: 2019-10-08 | Stop reason: HOSPADM

## 2019-10-05 RX ORDER — SODIUM BICARBONATE 650 MG/1
650 TABLET ORAL 3 TIMES DAILY
Status: DISCONTINUED | OUTPATIENT
Start: 2019-10-05 | End: 2019-10-08 | Stop reason: HOSPADM

## 2019-10-05 RX ORDER — FLUTICASONE PROPIONATE 110 UG/1
1 AEROSOL, METERED RESPIRATORY (INHALATION) DAILY
Status: DISCONTINUED | OUTPATIENT
Start: 2019-10-06 | End: 2019-10-08 | Stop reason: HOSPADM

## 2019-10-05 RX ORDER — ALBUTEROL SULFATE 0.83 MG/ML
1 SOLUTION RESPIRATORY (INHALATION) EVERY 6 HOURS PRN
COMMUNITY

## 2019-10-05 RX ORDER — EPINEPHRINE 0.22MG
200 AEROSOL WITH ADAPTER (ML) INHALATION DAILY
COMMUNITY

## 2019-10-05 RX ORDER — SOTALOL HYDROCHLORIDE 80 MG/1
40 TABLET ORAL 2 TIMES DAILY
COMMUNITY

## 2019-10-05 RX ORDER — CYANOCOBALAMIN 1000 UG/ML
1000 INJECTION, SOLUTION INTRAMUSCULAR; SUBCUTANEOUS
COMMUNITY

## 2019-10-05 RX ORDER — ASPIRIN 325 MG
325 TABLET ORAL
Status: DISCONTINUED | OUTPATIENT
Start: 2019-10-05 | End: 2019-10-05

## 2019-10-05 RX ORDER — POLYETHYLENE GLYCOL 3350 17 G/17G
17 POWDER, FOR SOLUTION ORAL DAILY
Status: DISCONTINUED | OUTPATIENT
Start: 2019-10-06 | End: 2019-10-08 | Stop reason: HOSPADM

## 2019-10-05 RX ORDER — MEMANTINE HYDROCHLORIDE 5 MG/1
5 TABLET ORAL 2 TIMES DAILY
COMMUNITY

## 2019-10-05 RX ORDER — ESCITALOPRAM OXALATE 20 MG/1
20 TABLET ORAL NIGHTLY
COMMUNITY

## 2019-10-05 RX ORDER — ROSUVASTATIN CALCIUM 20 MG/1
40 TABLET, COATED ORAL NIGHTLY
Status: DISCONTINUED | OUTPATIENT
Start: 2019-10-05 | End: 2019-10-06

## 2019-10-05 RX ORDER — LEVALBUTEROL TARTRATE 45 UG/1
2 AEROSOL, METERED ORAL 2 TIMES DAILY
COMMUNITY

## 2019-10-05 RX ORDER — GLUCAGON 1 MG
1 KIT INJECTION
Status: DISCONTINUED | OUTPATIENT
Start: 2019-10-05 | End: 2019-10-08 | Stop reason: HOSPADM

## 2019-10-05 RX ORDER — TORSEMIDE 20 MG/1
30 TABLET ORAL DAILY
COMMUNITY

## 2019-10-05 RX ORDER — LEVALBUTEROL INHALATION SOLUTION 0.63 MG/3ML
0.63 SOLUTION RESPIRATORY (INHALATION)
Status: DISCONTINUED | OUTPATIENT
Start: 2019-10-06 | End: 2019-10-08 | Stop reason: HOSPADM

## 2019-10-05 RX ORDER — GUAIFENESIN 100 MG/5ML
200 SOLUTION ORAL EVERY 4 HOURS PRN
COMMUNITY

## 2019-10-05 RX ORDER — ENOXAPARIN SODIUM 100 MG/ML
40 INJECTION SUBCUTANEOUS EVERY 24 HOURS
Status: DISCONTINUED | OUTPATIENT
Start: 2019-10-05 | End: 2019-10-07

## 2019-10-05 RX ORDER — SODIUM CHLORIDE 0.9 % (FLUSH) 0.9 %
10 SYRINGE (ML) INJECTION
Status: DISCONTINUED | OUTPATIENT
Start: 2019-10-05 | End: 2019-10-08 | Stop reason: HOSPADM

## 2019-10-05 RX ORDER — DONEPEZIL HYDROCHLORIDE 5 MG/1
10 TABLET, FILM COATED ORAL NIGHTLY
Status: DISCONTINUED | OUTPATIENT
Start: 2019-10-05 | End: 2019-10-08 | Stop reason: HOSPADM

## 2019-10-05 RX ORDER — BACITRACIN ZINC AND POLYMYXIN B SULFATE 500; 10000 [USP'U]/G; [USP'U]/G
1 OINTMENT OPHTHALMIC NIGHTLY
COMMUNITY

## 2019-10-05 RX ORDER — ONDANSETRON 4 MG/1
8 TABLET, ORALLY DISINTEGRATING ORAL EVERY 8 HOURS PRN
Status: DISCONTINUED | OUTPATIENT
Start: 2019-10-05 | End: 2019-10-08 | Stop reason: HOSPADM

## 2019-10-05 RX ORDER — NITROGLYCERIN 0.4 MG/1
0.4 TABLET SUBLINGUAL EVERY 5 MIN PRN
Status: DISCONTINUED | OUTPATIENT
Start: 2019-10-05 | End: 2019-10-06

## 2019-10-05 RX ORDER — FLUTICASONE PROPIONATE 50 UG/1
1 POWDER, METERED RESPIRATORY (INHALATION) DAILY PRN
COMMUNITY
End: 2019-12-14

## 2019-10-05 RX ORDER — INSULIN ASPART 100 [IU]/ML
0-5 INJECTION, SOLUTION INTRAVENOUS; SUBCUTANEOUS
Status: DISCONTINUED | OUTPATIENT
Start: 2019-10-05 | End: 2019-10-08 | Stop reason: HOSPADM

## 2019-10-05 RX ORDER — MEMANTINE HYDROCHLORIDE 5 MG/1
5 TABLET ORAL 2 TIMES DAILY
Status: DISCONTINUED | OUTPATIENT
Start: 2019-10-05 | End: 2019-10-08 | Stop reason: HOSPADM

## 2019-10-05 RX ORDER — IBUPROFEN 200 MG
24 TABLET ORAL
Status: DISCONTINUED | OUTPATIENT
Start: 2019-10-05 | End: 2019-10-08 | Stop reason: HOSPADM

## 2019-10-05 RX ORDER — SOTALOL HYDROCHLORIDE 80 MG/1
40 TABLET ORAL 2 TIMES DAILY
Status: DISCONTINUED | OUTPATIENT
Start: 2019-10-05 | End: 2019-10-08 | Stop reason: HOSPADM

## 2019-10-05 RX ORDER — ATORVASTATIN CALCIUM 20 MG/1
20 TABLET, FILM COATED ORAL NIGHTLY
COMMUNITY

## 2019-10-05 RX ORDER — IBUPROFEN 200 MG
16 TABLET ORAL
Status: DISCONTINUED | OUTPATIENT
Start: 2019-10-05 | End: 2019-10-08 | Stop reason: HOSPADM

## 2019-10-05 RX ORDER — SODIUM BICARBONATE 650 MG/1
650 TABLET ORAL 3 TIMES DAILY
Status: ON HOLD | COMMUNITY
End: 2019-12-18 | Stop reason: HOSPADM

## 2019-10-05 RX ORDER — ASPIRIN 81 MG/1
81 TABLET ORAL DAILY
Status: DISCONTINUED | OUTPATIENT
Start: 2019-10-06 | End: 2019-10-06

## 2019-10-05 RX ADMIN — ROSUVASTATIN 40 MG: 20 TABLET, FILM COATED ORAL at 10:10

## 2019-10-05 RX ADMIN — ONDANSETRON 8 MG: 2 INJECTION INTRAMUSCULAR; INTRAVENOUS at 01:10

## 2019-10-05 RX ADMIN — MORPHINE SULFATE 2 MG: 4 INJECTION INTRAVENOUS at 04:10

## 2019-10-05 RX ADMIN — NITROGLYCERIN 0.5 INCH: 20 OINTMENT TOPICAL at 02:10

## 2019-10-05 RX ADMIN — ENOXAPARIN SODIUM 40 MG: 100 INJECTION SUBCUTANEOUS at 10:10

## 2019-10-05 RX ADMIN — SODIUM BICARBONATE 650 MG TABLET 650 MG: at 10:10

## 2019-10-05 RX ADMIN — MEMANTINE HYDROCHLORIDE 5 MG: 5 TABLET ORAL at 10:10

## 2019-10-05 RX ADMIN — MORPHINE SULFATE 2 MG: 4 INJECTION INTRAVENOUS at 01:10

## 2019-10-05 RX ADMIN — SOTALOL HYDROCHLORIDE 40 MG: 80 TABLET ORAL at 10:10

## 2019-10-05 RX ADMIN — DONEPEZIL HYDROCHLORIDE 10 MG: 5 TABLET, FILM COATED ORAL at 10:10

## 2019-10-05 NOTE — Clinical Note
Catheter is repositioned to the ostial  left coronary artery. Angiography performed of the left coronary arteries in multiple views. Angiography performed via power injection with 8 mL contrast at 4 mL/s.

## 2019-10-05 NOTE — ED PROVIDER NOTES
Encounter Date: 10/5/2019       History     Chief Complaint   Patient presents with    Chest Pain     Transfer from [facility] Chest Pain, NSTEMI     79-year-old female presenting with chest pain patient sent from her nursing home to [facility] for evaluation of complaint of chest pain however upon arrival patient denied chest pain. At this medical Center they trended her cardiac enzymes and 2nd set of cardiac enzymes were positive.  Patient had no EKG changes.  They contacted cardiology on-call at [facility] and patient was accepted for transfer.  Patient has had aspirin and nitroglycerin and remains chest pain free.  Patient will be consulted to Internal Medicine for admission.        Review of patient's allergies indicates:  No Known Allergies  Past Medical History:   Diagnosis Date    Alzheimer disease     Anemia     Atrial fibrillation     Cataract     CHF (congestive heart failure)     Chronic kidney disease     Coronary artery disease     Dementia     Depression     Diabetes mellitus     GERD (gastroesophageal reflux disease)     Hyperlipidemia     Hypertension     Hypothyroidism     Osteoarthritis     Renal disorder     Spinal stenosis      Past Surgical History:   Procedure Laterality Date    CARDIAC SURGERY      pci x 8    CHOLECYSTECTOMY       Family History   Problem Relation Age of Onset    COPD Mother     Diabetes Mother     Heart disease Mother     Hyperlipidemia Mother     Hypertension Mother     Heart disease Father     Cancer Sister         Ovarian cancer     Social History     Tobacco Use    Smoking status: Former Smoker    Tobacco comment: Right in 1986   Substance Use Topics    Alcohol use: Never     Frequency: Never    Drug use: Never     Review of Systems   Constitutional: Negative for fever.   HENT: Negative for congestion, rhinorrhea, sore throat and trouble swallowing.    Eyes: Negative for visual disturbance.   Respiratory: Negative for cough, chest  tightness, shortness of breath and wheezing.    Cardiovascular: Positive for chest pain. Negative for palpitations and leg swelling.   Gastrointestinal: Negative for abdominal distention, abdominal pain, constipation, diarrhea, nausea and vomiting.   Genitourinary: Negative for difficulty urinating, dysuria, flank pain and frequency.   Musculoskeletal: Negative for arthralgias, back pain, joint swelling and neck pain.   Skin: Negative for color change and rash.   Neurological: Negative for dizziness, syncope, speech difficulty, weakness, numbness and headaches.   All other systems reviewed and are negative.      Physical Exam     Initial Vitals [10/05/19 1808]   BP Pulse Resp Temp SpO2   130/74 63 16 97.9 °F (36.6 °C) (!) 93 %      MAP       --         Physical Exam    Nursing note and vitals reviewed.  Constitutional: She appears well-developed and well-nourished. She is not diaphoretic. No distress.   HENT:   Head: Normocephalic and atraumatic.   Right Ear: External ear normal.   Left Ear: External ear normal.   Nose: Nose normal.   Mouth/Throat: Oropharynx is clear and moist. No oropharyngeal exudate.   Eyes: Conjunctivae and EOM are normal. Pupils are equal, round, and reactive to light. Right eye exhibits no discharge. Left eye exhibits no discharge. No scleral icterus.   Neck: Normal range of motion. Neck supple. No thyromegaly present. No tracheal deviation present. No JVD present.   Cardiovascular: Normal rate, regular rhythm, normal heart sounds and intact distal pulses. Exam reveals no gallop and no friction rub.    No murmur heard.  Pulmonary/Chest: Breath sounds normal. No stridor. No respiratory distress. She has no wheezes. She has no rhonchi. She has no rales. She exhibits no tenderness.   Abdominal: Soft. Bowel sounds are normal. She exhibits no distension and no mass. There is no tenderness. There is no rebound and no guarding.   Musculoskeletal: Normal range of motion. She exhibits no edema or  tenderness.   Lymphadenopathy:     She has no cervical adenopathy.   Neurological: She is alert. She has normal strength. She displays normal reflexes. No cranial nerve deficit or sensory deficit.   Oriented to person and place   Skin: Skin is warm and dry. No rash and no abscess noted. No erythema. No pallor.         ED Course   Procedures  Labs Reviewed   TROPONIN I - Abnormal; Notable for the following components:       Result Value    Troponin I 2.342 (*)     All other components within normal limits    Narrative:       trop critical result(s) called and verbal readback obtained from   kurtis gaines rn er , 10/05/2019 20:37          Imaging Results    None          Medical Decision Making:   History:   Old Medical Records: I decided to obtain old medical records.  Initial Assessment:   Emergent evaluation of a 79-year-old female presenting with chest pain differential diagnosis includes ACS, electrolyte abnormality, endocrine dysfunction, NSTEMI patient consulted to Internal Medicine for further evaluation and management                      Clinical Impression:       ICD-10-CM ICD-9-CM   1. NSTEMI (non-ST elevated myocardial infarction) I21.4 410.70   2. Chest pain R07.9 786.50   3. Chest pain, unspecified type R07.9 786.50                                Donavan Schulte MD  10/05/19 2158

## 2019-10-05 NOTE — ED NOTES
Report and all required transfer paperwork given to [facility] paramedic SARAH Hernandez NRP. Patient moved from ED exam 7 to EMS stretcher. Patient in care of EMS.

## 2019-10-05 NOTE — ED NOTES
[facility] contacted to advise patient is now an upgrade in care for cardiology services related to a NSTEMI; per ED physician request.

## 2019-10-05 NOTE — ED NOTES
Patient and daughters updated on plan of care and where transfer process was. Daughters state they do not wish to be transferred to any other facility but that where Dr. Mattson practices. Daughters also requesting that patient be medicated for pain again due to mother appearing to be uncomfortable. ED physician notified.

## 2019-10-05 NOTE — ED PROVIDER NOTES
Encounter Date: 10/5/2019       History     Chief Complaint   Patient presents with    Chest Pain     substernal chest pain, began at 0900    Shortness of Breath     This is a 79-year-old female that presented from nursing home with complaints of chest pain. Patient complained to the staff at the nursing home of chest pain and they gave 1 nitroglycerin and aspirin.  There was no significant relief of the chest pain. On the way here by EMS her complaints change to leg pain and on arrival here she denied chest pain. Patient does have a history of dementia.  Patient was mildly short of breath at the time of presentation.  This was mostly on exertion from moving from the stretcher to the bed.  Patient has a past medical history of atrial fibrillation, Alzheimer's disease, CHF, depression, diabetes mellitus, hyperlipidemia, and spinal stenosis.        Review of patient's allergies indicates:  No Known Allergies  Past Medical History:   Diagnosis Date    Alzheimer disease     Atrial fibrillation     CHF (congestive heart failure)     Depression     Diabetes mellitus     Hyperlipidemia     Spinal stenosis      History reviewed. No pertinent surgical history.  History reviewed. No pertinent family history.  Social History     Tobacco Use    Smoking status: Never Smoker   Substance Use Topics    Alcohol use: Never     Frequency: Never    Drug use: Not on file     Review of Systems   Constitutional: Negative for activity change, appetite change, chills, diaphoresis, fatigue, fever and unexpected weight change.   HENT: Positive for congestion. Negative for ear discharge, nosebleeds, postnasal drip, sinus pressure, sneezing and sore throat.    Eyes: Negative for photophobia, pain, discharge, redness, itching and visual disturbance.   Respiratory: Positive for chest tightness and shortness of breath. Negative for apnea, cough, choking, wheezing and stridor.    Cardiovascular: Positive for chest pain and leg swelling.  Negative for palpitations.   Gastrointestinal: Positive for abdominal pain. Negative for abdominal distention, blood in stool and constipation.   Endocrine: Negative for cold intolerance, heat intolerance, polydipsia and polyphagia.   Genitourinary: Negative for difficulty urinating, flank pain and frequency.   Musculoskeletal: Positive for arthralgias, back pain and myalgias. Negative for gait problem, joint swelling and neck pain.   Skin: Negative for color change and pallor.   Neurological: Negative for dizziness, facial asymmetry and headaches.   Psychiatric/Behavioral: Positive for decreased concentration. Negative for behavioral problems. The patient is not nervous/anxious.        Physical Exam     Initial Vitals [10/05/19 1114]   BP Pulse Resp Temp SpO2   -- -- -- -- 98 %      MAP       --         Physical Exam    Nursing note and vitals reviewed.  Constitutional: She appears well-developed and well-nourished.   HENT:   Head: Normocephalic and atraumatic.   Eyes: EOM are normal. Pupils are equal, round, and reactive to light.   Cardiovascular: Normal rate.   Pulmonary/Chest: She is in respiratory distress. She has rales.   Abdominal: Soft. Bowel sounds are normal. There is guarding.   Musculoskeletal: Normal range of motion.   Neurological: She is alert and oriented to person, place, and time. She has normal reflexes. GCS score is 15. GCS eye subscore is 4. GCS verbal subscore is 5. GCS motor subscore is 6.   Skin: Skin is warm and dry.   Psychiatric: She has a normal mood and affect.         ED Course   Procedures  Labs Reviewed   CBC W/ AUTO DIFFERENTIAL   COMPREHENSIVE METABOLIC PANEL   TROPONIN I   B-TYPE NATRIURETIC PEPTIDE     EKG Readings: (Independently Interpreted)   Initial Reading: No STEMI. Rhythm: Atrial Fibrillation. Ectopy: Rare.   Atrial fibrillation with nonspecific ST T wave changes, controlled ventricular response       Imaging Results    None          Medical Decision Making:   Clinical  Tests:   Lab Tests: Ordered and Reviewed  The following lab test(s) were unremarkable: CBC, CMP and Troponin                   ED Course as of Oct 05 1225   Sat Oct 05, 2019   1224 BNP(!): 218 [JK]   1225 BNP(!): 218 [JK]   1225 Troponin I: 0.21 [JK]   1225 Troponin I: 0.21 [JK]   1225 Glucose(!): 184 [JK]   1225 Leukocytes, UA(!): Trace [JK]      ED Course User Index  [JK] Gonzales Piper MD     Clinical Impression:       ICD-10-CM ICD-9-CM   1. Chest pain R07.9 786.50

## 2019-10-05 NOTE — ED NOTES
Received call from [facility] requesting to connect Dr. Piper with [facility] ED physician.

## 2019-10-05 NOTE — ED NOTES
[facility] contacted to initiate transfer process per request of ED physician. Family member requesting patient be transferred from [facility] to [facility]. Family insistent on transfer due to patient having established cardiology care at [facility].

## 2019-10-05 NOTE — ED TRIAGE NOTES
Transfer from [facility]. Seen there for Chest Pain. Tx with ASA, NTG, Morphine, Zofran. Pain free at present. Troponin 0.76

## 2019-10-05 NOTE — ED NOTES
Received phone call from [facility] stating Dr. Mattson no longer practices [facility] per [facility] staff. [facility] states [facility] recommends patient be transferred to [place] where Dr. Mattson is on staff.

## 2019-10-05 NOTE — Clinical Note
90 ml injected throughout the case. 160 mL total wasted during the case. 250 mL total used in the case.

## 2019-10-05 NOTE — ED NOTES
Pt accepted by Dr. Maradiaga for transfer to [facility]. # for report is [phone].

## 2019-10-05 NOTE — ED NOTES
Daughter stating family is in agreement for transfer to [place] at this time for continuation of care with Dr. Mattson. [facility] notified of this.

## 2019-10-06 ENCOUNTER — CLINICAL SUPPORT (OUTPATIENT)
Dept: CARDIOLOGY | Facility: HOSPITAL | Age: 79
DRG: 247 | End: 2019-10-06
Attending: INTERNAL MEDICINE
Payer: MEDICARE

## 2019-10-06 VITALS — BODY MASS INDEX: 40.22 KG/M2 | HEIGHT: 65 IN | WEIGHT: 241.38 LBS

## 2019-10-06 LAB
ANION GAP SERPL CALC-SCNC: 7 MMOL/L (ref 8–16)
BASOPHILS # BLD AUTO: 0.04 K/UL (ref 0–0.2)
BASOPHILS NFR BLD: 0.3 % (ref 0–1.9)
BUN SERPL-MCNC: 14 MG/DL (ref 8–23)
CALCIUM SERPL-MCNC: 9.3 MG/DL (ref 8.7–10.5)
CHLORIDE SERPL-SCNC: 101 MMOL/L (ref 95–110)
CHOLEST SERPL-MCNC: 134 MG/DL (ref 120–199)
CHOLEST SERPL-MCNC: 134 MG/DL (ref 120–199)
CHOLEST/HDLC SERPL: 3.4 {RATIO} (ref 2–5)
CHOLEST/HDLC SERPL: 3.4 {RATIO} (ref 2–5)
CO2 SERPL-SCNC: 30 MMOL/L (ref 23–29)
CREAT SERPL-MCNC: 1.2 MG/DL (ref 0.5–1.4)
DIFFERENTIAL METHOD: ABNORMAL
EOSINOPHIL # BLD AUTO: 0.1 K/UL (ref 0–0.5)
EOSINOPHIL NFR BLD: 1 % (ref 0–8)
ERYTHROCYTE [DISTWIDTH] IN BLOOD BY AUTOMATED COUNT: 14.6 % (ref 11.5–14.5)
EST. GFR  (AFRICAN AMERICAN): 50 ML/MIN/1.73 M^2
EST. GFR  (NON AFRICAN AMERICAN): 43.4 ML/MIN/1.73 M^2
ESTIMATED AVG GLUCOSE: 143 MG/DL (ref 68–131)
GLUCOSE SERPL-MCNC: 121 MG/DL (ref 70–110)
GLUCOSE SERPL-MCNC: 133 MG/DL (ref 70–110)
GLUCOSE SERPL-MCNC: 146 MG/DL (ref 70–110)
GLUCOSE SERPL-MCNC: 152 MG/DL (ref 70–110)
HBA1C MFR BLD HPLC: 6.6 % (ref 4.5–6.2)
HCT VFR BLD AUTO: 42.4 % (ref 37–48.5)
HDLC SERPL-MCNC: 39 MG/DL (ref 40–75)
HDLC SERPL-MCNC: 39 MG/DL (ref 40–75)
HDLC SERPL: 29.1 % (ref 20–50)
HDLC SERPL: 29.1 % (ref 20–50)
HGB BLD-MCNC: 13.1 G/DL (ref 12–16)
IMM GRANULOCYTES # BLD AUTO: 0.08 K/UL (ref 0–0.04)
IMM GRANULOCYTES NFR BLD AUTO: 0.6 % (ref 0–0.5)
LDLC SERPL CALC-MCNC: 71.8 MG/DL (ref 63–159)
LDLC SERPL CALC-MCNC: 71.8 MG/DL (ref 63–159)
LYMPHOCYTES # BLD AUTO: 3.4 K/UL (ref 1–4.8)
LYMPHOCYTES NFR BLD: 25.4 % (ref 18–48)
MAGNESIUM SERPL-MCNC: 2.2 MG/DL (ref 1.6–2.6)
MCH RBC QN AUTO: 28.4 PG (ref 27–31)
MCHC RBC AUTO-ENTMCNC: 30.9 G/DL (ref 32–36)
MCV RBC AUTO: 92 FL (ref 82–98)
MONOCYTES # BLD AUTO: 0.9 K/UL (ref 0.3–1)
MONOCYTES NFR BLD: 6.7 % (ref 4–15)
NEUTROPHILS # BLD AUTO: 8.7 K/UL (ref 1.8–7.7)
NEUTROPHILS NFR BLD: 66 % (ref 38–73)
NONHDLC SERPL-MCNC: 95 MG/DL
NONHDLC SERPL-MCNC: 95 MG/DL
NRBC BLD-RTO: 0 /100 WBC
PLATELET # BLD AUTO: 246 K/UL (ref 150–350)
PMV BLD AUTO: 10.7 FL (ref 9.2–12.9)
POC ACTIVATED CLOTTING TIME K: 263 SEC (ref 74–137)
POTASSIUM SERPL-SCNC: 4.1 MMOL/L (ref 3.5–5.1)
RBC # BLD AUTO: 4.61 M/UL (ref 4–5.4)
SAMPLE: ABNORMAL
SODIUM SERPL-SCNC: 138 MMOL/L (ref 136–145)
T4 FREE SERPL-MCNC: 1.47 NG/DL (ref 0.71–1.51)
TRIGL SERPL-MCNC: 116 MG/DL (ref 30–150)
TRIGL SERPL-MCNC: 116 MG/DL (ref 30–150)
TSH SERPL DL<=0.005 MIU/L-ACNC: 0.1 UIU/ML (ref 0.34–5.6)
WBC # BLD AUTO: 13.23 K/UL (ref 3.9–12.7)

## 2019-10-06 PROCEDURE — 25000003 PHARM REV CODE 250: Performed by: INTERNAL MEDICINE

## 2019-10-06 PROCEDURE — 25000242 PHARM REV CODE 250 ALT 637 W/ HCPCS: Performed by: INTERNAL MEDICINE

## 2019-10-06 PROCEDURE — 80048 BASIC METABOLIC PNL TOTAL CA: CPT

## 2019-10-06 PROCEDURE — 84439 ASSAY OF FREE THYROXINE: CPT

## 2019-10-06 PROCEDURE — C9606 PERC D-E COR REVASC W AMI S: HCPCS | Mod: RC

## 2019-10-06 PROCEDURE — C1769 GUIDE WIRE: HCPCS | Performed by: INTERNAL MEDICINE

## 2019-10-06 PROCEDURE — 63600175 PHARM REV CODE 636 W HCPCS: Performed by: INTERNAL MEDICINE

## 2019-10-06 PROCEDURE — C1887 CATHETER, GUIDING: HCPCS | Performed by: INTERNAL MEDICINE

## 2019-10-06 PROCEDURE — 93454 CORONARY ARTERY ANGIO S&I: CPT | Mod: XU

## 2019-10-06 PROCEDURE — 83036 HEMOGLOBIN GLYCOSYLATED A1C: CPT

## 2019-10-06 PROCEDURE — C1894 INTRO/SHEATH, NON-LASER: HCPCS | Performed by: INTERNAL MEDICINE

## 2019-10-06 PROCEDURE — 84443 ASSAY THYROID STIM HORMONE: CPT

## 2019-10-06 PROCEDURE — C1725 CATH, TRANSLUMIN NON-LASER: HCPCS | Performed by: INTERNAL MEDICINE

## 2019-10-06 PROCEDURE — 21000000 HC CCU ICU ROOM CHARGE

## 2019-10-06 PROCEDURE — 36415 COLL VENOUS BLD VENIPUNCTURE: CPT

## 2019-10-06 PROCEDURE — 94761 N-INVAS EAR/PLS OXIMETRY MLT: CPT

## 2019-10-06 PROCEDURE — 93005 ELECTROCARDIOGRAM TRACING: CPT

## 2019-10-06 PROCEDURE — C1874 STENT, COATED/COV W/DEL SYS: HCPCS | Performed by: INTERNAL MEDICINE

## 2019-10-06 PROCEDURE — 82962 GLUCOSE BLOOD TEST: CPT

## 2019-10-06 PROCEDURE — 99152 MOD SED SAME PHYS/QHP 5/>YRS: CPT | Performed by: INTERNAL MEDICINE

## 2019-10-06 PROCEDURE — 83735 ASSAY OF MAGNESIUM: CPT

## 2019-10-06 PROCEDURE — 93306 TTE W/DOPPLER COMPLETE: CPT

## 2019-10-06 PROCEDURE — 27000221 HC OXYGEN, UP TO 24 HOURS

## 2019-10-06 PROCEDURE — 99153 MOD SED SAME PHYS/QHP EA: CPT | Performed by: INTERNAL MEDICINE

## 2019-10-06 PROCEDURE — 94640 AIRWAY INHALATION TREATMENT: CPT

## 2019-10-06 PROCEDURE — 85025 COMPLETE CBC W/AUTO DIFF WBC: CPT

## 2019-10-06 PROCEDURE — 99900035 HC TECH TIME PER 15 MIN (STAT)

## 2019-10-06 PROCEDURE — 80061 LIPID PANEL: CPT

## 2019-10-06 DEVICE — STENT RONYX22526UX RESOLUTE ONYX 2.25X26
Type: IMPLANTABLE DEVICE | Site: CORONARY | Status: FUNCTIONAL
Brand: RESOLUTE ONYX™

## 2019-10-06 RX ORDER — LIDOCAINE HYDROCHLORIDE 10 MG/ML
INJECTION, SOLUTION EPIDURAL; INFILTRATION; INTRACAUDAL; PERINEURAL
Status: DISCONTINUED | OUTPATIENT
Start: 2019-10-06 | End: 2019-10-08 | Stop reason: HOSPADM

## 2019-10-06 RX ORDER — CLOPIDOGREL BISULFATE 75 MG/1
300 TABLET ORAL ONCE
Status: COMPLETED | OUTPATIENT
Start: 2019-10-06 | End: 2019-10-06

## 2019-10-06 RX ORDER — ASPIRIN 325 MG
325 TABLET, DELAYED RELEASE (ENTERIC COATED) ORAL DAILY
Status: DISCONTINUED | OUTPATIENT
Start: 2019-10-07 | End: 2019-10-06

## 2019-10-06 RX ORDER — HYDROMORPHONE HYDROCHLORIDE 1 MG/ML
INJECTION, SOLUTION INTRAMUSCULAR; INTRAVENOUS; SUBCUTANEOUS
Status: DISCONTINUED | OUTPATIENT
Start: 2019-10-06 | End: 2019-10-08 | Stop reason: HOSPADM

## 2019-10-06 RX ORDER — ATORVASTATIN CALCIUM 40 MG/1
80 TABLET, FILM COATED ORAL NIGHTLY
Status: DISCONTINUED | OUTPATIENT
Start: 2019-10-06 | End: 2019-10-08 | Stop reason: HOSPADM

## 2019-10-06 RX ORDER — CLOPIDOGREL BISULFATE 75 MG/1
75 TABLET ORAL DAILY
Status: DISCONTINUED | OUTPATIENT
Start: 2019-10-07 | End: 2019-10-08 | Stop reason: HOSPADM

## 2019-10-06 RX ORDER — VERAPAMIL HYDROCHLORIDE 2.5 MG/ML
INJECTION, SOLUTION INTRAVENOUS
Status: DISCONTINUED | OUTPATIENT
Start: 2019-10-06 | End: 2019-10-08 | Stop reason: HOSPADM

## 2019-10-06 RX ORDER — FENTANYL CITRATE 50 UG/ML
INJECTION, SOLUTION INTRAMUSCULAR; INTRAVENOUS
Status: DISCONTINUED | OUTPATIENT
Start: 2019-10-06 | End: 2019-10-08 | Stop reason: HOSPADM

## 2019-10-06 RX ORDER — HEPARIN SODIUM 1000 [USP'U]/ML
INJECTION, SOLUTION INTRAVENOUS; SUBCUTANEOUS
Status: DISCONTINUED | OUTPATIENT
Start: 2019-10-06 | End: 2019-10-08 | Stop reason: HOSPADM

## 2019-10-06 RX ORDER — NAPROXEN SODIUM 220 MG/1
243 TABLET, FILM COATED ORAL ONCE
Status: COMPLETED | OUTPATIENT
Start: 2019-10-06 | End: 2019-10-06

## 2019-10-06 RX ORDER — SODIUM CHLORIDE 450 MG/100ML
75 INJECTION, SOLUTION INTRAVENOUS CONTINUOUS
Status: ACTIVE | OUTPATIENT
Start: 2019-10-06 | End: 2019-10-07

## 2019-10-06 RX ORDER — NITROGLYCERIN 0.4 MG/1
0.4 TABLET SUBLINGUAL EVERY 5 MIN PRN
Status: DISCONTINUED | OUTPATIENT
Start: 2019-10-06 | End: 2019-10-08 | Stop reason: HOSPADM

## 2019-10-06 RX ORDER — MIDAZOLAM HYDROCHLORIDE 1 MG/ML
INJECTION INTRAMUSCULAR; INTRAVENOUS
Status: DISCONTINUED | OUTPATIENT
Start: 2019-10-06 | End: 2019-10-08 | Stop reason: HOSPADM

## 2019-10-06 RX ORDER — HYDROCODONE BITARTRATE AND ACETAMINOPHEN 5; 325 MG/1; MG/1
1 TABLET ORAL EVERY 4 HOURS PRN
Status: DISCONTINUED | OUTPATIENT
Start: 2019-10-06 | End: 2019-10-08 | Stop reason: HOSPADM

## 2019-10-06 RX ORDER — ASPIRIN 81 MG/1
81 TABLET ORAL DAILY
Status: DISCONTINUED | OUTPATIENT
Start: 2019-10-07 | End: 2019-10-08 | Stop reason: HOSPADM

## 2019-10-06 RX ORDER — NITROGLYCERIN 5 MG/ML
INJECTION, SOLUTION INTRAVENOUS
Status: DISCONTINUED | OUTPATIENT
Start: 2019-10-06 | End: 2019-10-08 | Stop reason: HOSPADM

## 2019-10-06 RX ORDER — NAPROXEN SODIUM 220 MG/1
243 TABLET, FILM COATED ORAL DAILY
Status: DISCONTINUED | OUTPATIENT
Start: 2019-10-06 | End: 2019-10-06

## 2019-10-06 RX ADMIN — SODIUM BICARBONATE 650 MG TABLET 650 MG: at 03:10

## 2019-10-06 RX ADMIN — DONEPEZIL HYDROCHLORIDE 10 MG: 5 TABLET, FILM COATED ORAL at 08:10

## 2019-10-06 RX ADMIN — LEVALBUTEROL HYDROCHLORIDE 0.63 MG: 0.63 SOLUTION RESPIRATORY (INHALATION) at 07:10

## 2019-10-06 RX ADMIN — CLOPIDOGREL BISULFATE 300 MG: 75 TABLET, FILM COATED ORAL at 11:10

## 2019-10-06 RX ADMIN — ESCITALOPRAM OXALATE 20 MG: 10 TABLET ORAL at 03:10

## 2019-10-06 RX ADMIN — ATORVASTATIN CALCIUM 80 MG: 40 TABLET, FILM COATED ORAL at 08:10

## 2019-10-06 RX ADMIN — TORSEMIDE 20 MG: 20 TABLET ORAL at 03:10

## 2019-10-06 RX ADMIN — LEVALBUTEROL HYDROCHLORIDE 0.63 MG: 0.63 SOLUTION RESPIRATORY (INHALATION) at 01:10

## 2019-10-06 RX ADMIN — FLUTICASONE PROPIONATE 1 PUFF: 110 AEROSOL, METERED RESPIRATORY (INHALATION) at 08:10

## 2019-10-06 RX ADMIN — SOTALOL HYDROCHLORIDE 40 MG: 80 TABLET ORAL at 08:10

## 2019-10-06 RX ADMIN — ENOXAPARIN SODIUM 40 MG: 100 INJECTION SUBCUTANEOUS at 05:10

## 2019-10-06 RX ADMIN — SOTALOL HYDROCHLORIDE 40 MG: 80 TABLET ORAL at 03:10

## 2019-10-06 RX ADMIN — MEMANTINE HYDROCHLORIDE 5 MG: 5 TABLET ORAL at 08:10

## 2019-10-06 RX ADMIN — ASPIRIN 81 MG 243 MG: 81 TABLET ORAL at 03:10

## 2019-10-06 RX ADMIN — SODIUM BICARBONATE 650 MG TABLET 650 MG: at 08:10

## 2019-10-06 RX ADMIN — LEVOTHYROXINE SODIUM 137 MCG: 0.11 TABLET ORAL at 06:10

## 2019-10-06 RX ADMIN — SODIUM CHLORIDE 75 ML/HR: 0.45 INJECTION, SOLUTION INTRAVENOUS at 12:10

## 2019-10-06 RX ADMIN — LEVALBUTEROL HYDROCHLORIDE 0.63 MG: 0.63 SOLUTION RESPIRATORY (INHALATION) at 08:10

## 2019-10-06 RX ADMIN — CETIRIZINE HYDROCHLORIDE 10 MG: 10 TABLET, FILM COATED ORAL at 03:10

## 2019-10-06 NOTE — H&P
[facility] Medicine  History & Physical    Patient Name: Katy Melchor  MRN: [id number]  Admission Date: 10/5/2019  Attending Physician: Vini Fountain MD  Primary Care Provider: Julius Nguyen MD         Patient information was obtained from relative(s), nursing home and ER records.     Subjective:     Principal Problem:NSTEMI (non-ST elevated myocardial infarction)    Chief Complaint:   Chief Complaint   Patient presents with    Chest Pain     Transfer from [facility] Chest Pain, NSTEMI        HPI: 79-year-old white female transferred from [facility] for NSTEMI, patient is a resident of [facility] and has a past medical history of dementia with 1 episode of behavioral issue, diabetes type 2 (uncontrolled per daughter), hypertension, hyperlipidemia, coronary artery disease with history of more than 8 stents placed, COPD, depression  Upon reviewing the ER records noted patient was transferred to [facility] with complaint of chest pain, chest heaviness and pressure for 2 days.  When I examined the patient patient denied chest pain and reported she does not recall why she is in the ER.  Daughters also collaborated her story stating that the nursing home did informed them of the chest pain complain  In the ER patient initial cardiac enzymes were negative but the 2nd set of enzyme had a bump in troponin 2.7 with no changes on EKG.  Physician at [facility] consulted with Dr. Maradiaga and patient was accepted and transferred to [facility]    Past Medical History:   Diagnosis Date    Alzheimer disease     Anemia     Atrial fibrillation     Cataract     CHF (congestive heart failure)     Chronic kidney disease     Coronary artery disease     Dementia     Depression     Diabetes mellitus     GERD (gastroesophageal reflux disease)     Hyperlipidemia     Hypertension     Hypothyroidism     Osteoarthritis     Renal disorder     Spinal stenosis        Past Surgical  History:   Procedure Laterality Date    CARDIAC SURGERY      pci x 8    CHOLECYSTECTOMY         Review of patient's allergies indicates:  No Known Allergies    Current Facility-Administered Medications on File Prior to Encounter   Medication    [COMPLETED] morphine injection 2 mg    [COMPLETED] morphine injection 2 mg    [COMPLETED] nitroGLYCERIN 2% TD oint ointment 0.5 inch    [COMPLETED] ondansetron injection 8 mg    [DISCONTINUED] aspirin tablet 325 mg    [DISCONTINUED] nitroGLYCERIN 2% TD oint ointment 0.5 inch     Current Outpatient Medications on File Prior to Encounter   Medication Sig    ALBUTEROL SULFATE INHL Inhale into the lungs 4 (four) times daily as needed. 2.5mg/3ml soln    amino acids/protein hydrolys (PRO-STAT SUGAR FREE ORAL) Take 30 mLs by mouth once daily.    atorvastatin (LIPITOR) 20 MG tablet Take 20 mg by mouth once daily.    bacitracin-polymyxin b (POLYSPORIN) ophthalmic ointment Place into both eyes every evening.    calcium carbonate/vitamin D3 (CALTRATE WITH VITAMIN D3 ORAL) Take 1 tablet by mouth every morning.    coenzyme Q10 (COQ-10) 100 mg capsule Take 200 mg by mouth once daily.    cyanocobalamin 1,000 mcg/mL injection 1,000 mcg every 30 days.    donepezil (ARICEPT) 10 MG tablet Take 10 mg by mouth every evening.    escitalopram oxalate (LEXAPRO) 20 MG tablet Take 20 mg by mouth once daily.    fluticasone propionate (FLOVENT DISKUS) 50 mcg/actuation DsDv Inhale 1 spray into the lungs daily as needed. Controller    fluticasone-umeclidin-vilanter (TRELEGY ELLIPTA) 100-62.5-25 mcg DsDv Inhale 1 puff into the lungs once daily.    guaifenesin 100 mg/5 ml (ROBITUSSIN) 100 mg/5 mL syrup Take 200 mg by mouth 4 (four) times daily as needed for Cough.    levalbuterol (XOPENEX HFA) 45 mcg/actuation inhaler Inhale 2 puffs into the lungs 2 (two) times daily. Rescue    levothyroxine (SYNTHROID) 137 MCG Tab tablet Take by mouth before breakfast.    loratadine (CLARITIN) 10 mg  tablet Take 10 mg by mouth once daily.    memantine (NAMENDA) 10 MG Tab Take 5 mg by mouth 2 (two) times daily.    multivitamin Tab Take 1 tablet by mouth once daily.    potassium chloride (KLOR-CON M20 ORAL) Take 1 tablet by mouth once daily.    SITagliptin (JANUVIA) 50 MG Tab Take 100 mg by mouth once daily.    sodium bicarbonate 650 MG tablet Take 650 mg by mouth 3 (three) times daily.    sotalol (BETAPACE) 80 MG tablet Take 40 mg by mouth 2 (two) times daily.    tetrahydrozoline/polyethyl gly (EYE DROPS OPHT) Apply 1 drop to eye 4 (four) times daily.    torsemide (DEMADEX) 20 MG Tab Take 20 mg by mouth once daily.     Family History     Problem Relation (Age of Onset)    COPD Mother    Cancer Sister    Diabetes Mother    Heart disease Mother, Father    Hyperlipidemia Mother    Hypertension Mother        Tobacco Use    Smoking status: Former Smoker    Tobacco comment: Right in 1986   Substance and Sexual Activity    Alcohol use: Never     Frequency: Never    Drug use: Never    Sexual activity: Not Currently     Review of Systems   Constitutional: Positive for activity change.   HENT: Negative.         On nasal oxygen cannula   Eyes: Negative.    Respiratory: Positive for chest tightness (Associated with chest pain) and shortness of breath ( chronic with COPD). Negative for wheezing and stridor.    Cardiovascular: Positive for chest pain ( last 2 days) and leg swelling ( history CHF). Negative for palpitations.   Gastrointestinal: Negative.    Genitourinary: Negative.    Musculoskeletal: Negative.    Skin: Negative.    Allergic/Immunologic: Negative.    Neurological:        Dementia/Alzheimer's   Hematological: Negative.    Psychiatric/Behavioral: Positive for behavioral problems ( 1 episode when the physician at nursing home.  Turn depression medications and patient tried to leave the facility).        Depression     Objective:     Vital Signs (Most Recent):  Temp: 97.9 °F (36.6 °C) (10/05/19  1808)  Pulse: 63 (10/05/19 2000)  Resp: (!) 28 (10/05/19 2000)  BP: 134/60 (10/05/19 2000)  SpO2: (!) 91 % (10/05/19 2000) Vital Signs (24h Range):  Temp:  [97.9 °F (36.6 °C)-98 °F (36.7 °C)] 97.9 °F (36.6 °C)  Pulse:  [61-95] 63  Resp:  [10-29] 28  SpO2:  [86 %-98 %] 91 %  BP: (122-179)/() 134/60     Weight: 108.7 kg (239 lb 10.2 oz)  Body mass index is 39.88 kg/m².    Physical Exam   Constitutional: She is oriented to person, place, and time. She appears well-developed and well-nourished. No distress.   Obese   HENT:   Head: Normocephalic and atraumatic.   Nasal cannula on   Eyes: Pupils are equal, round, and reactive to light. Conjunctivae and EOM are normal. Right eye exhibits no discharge. Left eye exhibits no discharge.   Neck: Normal range of motion. Neck supple.   Cardiovascular: Normal heart sounds. Exam reveals no gallop and no friction rub.   No murmur heard.  Irregularly irregular, few will heart sounds due to body habitus   Pulmonary/Chest: Effort normal and breath sounds normal.   Examined anteriorly   Abdominal: Soft. Bowel sounds are normal.   Obese   Musculoskeletal: Normal range of motion. She exhibits edema (Trace bilateral ankle edema).   Patient has a monitor strapped to her right ankle   Neurological: She is alert and oriented to person, place, and time. No cranial nerve deficit.   Skin: Skin is warm and dry. No rash noted.   Psychiatric: She has a normal mood and affect. Her behavior is normal.   Forget fall, poor historian   Nursing note and vitals reviewed.        CRANIAL NERVES     CN III, IV, VI   Pupils are equal, round, and reactive to light.  Extraocular motions are normal.        Significant Labs:   CBC:   Recent Labs   Lab 10/05/19  1135   WBC 11.99   HGB 14.5   HCT 45.2        CMP:   Recent Labs   Lab 10/05/19  1135      K 3.8      CO2 23   *   BUN 13   CREATININE 1.2   CALCIUM 9.1   PROT 7.4   ALBUMIN 3.5   BILITOT 0.7   ALKPHOS 108   AST 25   ALT 20    ANIONGAP 12   EGFRNONAA 43.4*     Cardiac Markers:   Recent Labs   Lab 10/05/19  1135   *     Lipid Panel: No results for input(s): CHOL, HDL, LDLCALC, TRIG, CHOLHDL in the last 48 hours.  Magnesium: No results for input(s): MG in the last 48 hours.  Troponin:   Recent Labs   Lab 10/05/19  1135 10/05/19  1342   TROPONINI 0.21 0.76*     TSH:   Recent Labs   Lab 04/23/19  0715   TSH 0.277*       Significant Imaging: I have reviewed all pertinent imaging results/findings within the past 24 hours.     X-ray chest shows no acute chest disease, cardiomegaly      Assessment/Plan:     * NSTEMI (non-ST elevated myocardial infarction)  NSTEMI, transferred from [facility] accepted by Dr. Maradiaga  Cardiology Dr. Maradiaga consulted  Third set of cardiac enzyme ordered  EKG ordered for am per Dr Maradiaga recommendation  Patient is established with Dr. Ralph Mattson, last seen a month ago, patient or daughters could not recall when was her last echocardiogram, Dr Maradiaga advised to hold the order for echocardiogram as he will review pt's chart and advise accordingly  NPO after midnight for possible intervention if needed tomorrow  Lovenox 40 mg bid s/q  NTG 0.4 S/L prn    Chest pain  See NSTEMI     Coronary artery disease with approx 8 stents in the past  Known CAD with 8 stents at least per the daughter  Unsure when her last echo was     HTN (hypertension)  Chronic medical condition  Continue home medications    Hyperlipidemia  Chronic medical condition  Rosuvastatin 40 q.h.s.  FLP in a.m.    Diabetes mellitus, type 2  Chronic medical condition  Daughter reports patient is noncompliant with her diet  A1c in a.m.  Insulin sliding scale low  Accu-Cheks a.c. HS  Strict diabetic diet now, NPO after midnight      COPD (chronic obstructive pulmonary disease) on home oxygen  Chronic medical condition  On home oxygen, daughters does not know how much Liters of oxygen she is on  Continue home medications  Xopenex  nebulization  D/C albuterol neb as patient has history of CHF and AFib    Dementia with 1 episode of behavioral disturbance  Chronic medical condition  Continue home medication  Daughter reported 1 episode of behavior disturbance when her nursing home physician.  All her depression medication he and patient tried to leave the facility, patient has a monitoring ankle it on her right ankle    Chronic a-fib  On sotalol, continue meds        VTE Risk Mitigation (From admission, onward)    None             Vini Fountain MD  Department of Hospital Medicine   [facility]

## 2019-10-06 NOTE — PLAN OF CARE
10/06/19 0941   LR Message   Medicare Outpatient and Observation Notification regarding financial responsibility Given to patient/caregiver;Explained to patient/caregiver;Signed/date by patient/caregiver   Date LR was signed 10/06/19   Time LR was signed 0941

## 2019-10-06 NOTE — ASSESSMENT & PLAN NOTE
Chronic medical condition  On home oxygen, daughters does not know how much Liters of oxygen she is on  Continue home medications  Xopenex nebulization  D/C albuterol neb as patient has history of CHF and AFib

## 2019-10-06 NOTE — PROGRESS NOTES
[facility] Medicine  Progress Note    Patient Name: Katy Melchor  MRN: [id number]  Patient Class: IP- Inpatient   Admission Date: 10/5/2019  Length of Stay: 0 days  Attending Physician: Ole Sy MD  Primary Care Provider: Julius Nguyen MD        Subjective:     Principal Problem:NSTEMI (non-ST elevated myocardial infarction)        HPI:  79-year-old white female transferred from [facility] for NSTEMI, patient is a resident of [facility] and has a past medical history of dementia with 1 episode of behavioral issue, diabetes type 2 (uncontrolled per daughter), hypertension, hyperlipidemia, coronary artery disease with history of more than 8 stents placed, COPD, depression  Upon reviewing the ER records noted patient was transferred to [facility] with complaint of chest pain, chest heaviness and pressure for 2 days.  When I examined the patient patient denied chest pain and reported she does not recall why she is in the ER.  Daughters also collaborated her story stating that the nursing home did informed them of the chest pain complain  In the ER patient initial cardiac enzymes were negative but the 2nd set of enzyme had a bump in troponin 2.7 with no changes on EKG.  Physician at [place] [facility] consulted with Dr. Maradiaga and patient was accepted and transferred to [facility]    Overview/Hospital Course:  10/6 -  in cath lab with Dr. Maradiaga at the time of my visit    Past Medical History:   Diagnosis Date    Alzheimer disease     Anemia     Atrial fibrillation     Cataract     CHF (congestive heart failure)     Chronic kidney disease     Coronary artery disease     Dementia     Depression     Diabetes mellitus     GERD (gastroesophageal reflux disease)     Hyperlipidemia     Hypertension     Hypothyroidism     Osteoarthritis     Renal disorder     Spinal stenosis        Past Surgical History:   Procedure Laterality Date    CARDIAC SURGERY      pci  x 8    CHOLECYSTECTOMY         Review of patient's allergies indicates:  No Known Allergies    Current Facility-Administered Medications on File Prior to Encounter   Medication    [COMPLETED] morphine injection 2 mg    [COMPLETED] morphine injection 2 mg    [COMPLETED] nitroGLYCERIN 2% TD oint ointment 0.5 inch    [COMPLETED] ondansetron injection 8 mg    [DISCONTINUED] aspirin tablet 325 mg    [DISCONTINUED] nitroGLYCERIN 2% TD oint ointment 0.5 inch     Current Outpatient Medications on File Prior to Encounter   Medication Sig    ALBUTEROL SULFATE INHL Inhale into the lungs 4 (four) times daily as needed. 2.5mg/3ml soln    amino acids/protein hydrolys (PRO-STAT SUGAR FREE ORAL) Take 30 mLs by mouth once daily.    atorvastatin (LIPITOR) 20 MG tablet Take 20 mg by mouth once daily.    bacitracin-polymyxin b (POLYSPORIN) ophthalmic ointment Place into both eyes every evening.    calcium carbonate/vitamin D3 (CALTRATE WITH VITAMIN D3 ORAL) Take 1 tablet by mouth every morning.    coenzyme Q10 (COQ-10) 100 mg capsule Take 200 mg by mouth once daily.    cyanocobalamin 1,000 mcg/mL injection 1,000 mcg every 30 days.    donepezil (ARICEPT) 10 MG tablet Take 10 mg by mouth every evening.    escitalopram oxalate (LEXAPRO) 20 MG tablet Take 20 mg by mouth once daily.    fluticasone propionate (FLOVENT DISKUS) 50 mcg/actuation DsDv Inhale 1 spray into the lungs daily as needed. Controller    fluticasone-umeclidin-vilanter (TRELEGY ELLIPTA) 100-62.5-25 mcg DsDv Inhale 1 puff into the lungs once daily.    guaifenesin 100 mg/5 ml (ROBITUSSIN) 100 mg/5 mL syrup Take 200 mg by mouth 4 (four) times daily as needed for Cough.    levalbuterol (XOPENEX HFA) 45 mcg/actuation inhaler Inhale 2 puffs into the lungs 2 (two) times daily. Rescue    levothyroxine (SYNTHROID) 137 MCG Tab tablet Take by mouth before breakfast.    loratadine (CLARITIN) 10 mg tablet Take 10 mg by mouth once daily.    memantine (NAMENDA) 10  MG Tab Take 5 mg by mouth 2 (two) times daily.    multivitamin Tab Take 1 tablet by mouth once daily.    potassium chloride (KLOR-CON M20 ORAL) Take 1 tablet by mouth once daily.    SITagliptin (JANUVIA) 50 MG Tab Take 100 mg by mouth once daily.    sodium bicarbonate 650 MG tablet Take 650 mg by mouth 3 (three) times daily.    sotalol (BETAPACE) 80 MG tablet Take 40 mg by mouth 2 (two) times daily.    tetrahydrozoline/polyethyl gly (EYE DROPS OPHT) Apply 1 drop to eye 4 (four) times daily.    torsemide (DEMADEX) 20 MG Tab Take 20 mg by mouth once daily.     Family History     Problem Relation (Age of Onset)    COPD Mother    Cancer Sister    Diabetes Mother    Heart disease Mother, Father    Hyperlipidemia Mother    Hypertension Mother        Tobacco Use    Smoking status: Former Smoker    Tobacco comment: Right in 1986   Substance and Sexual Activity    Alcohol use: Never     Frequency: Never    Drug use: Never    Sexual activity: Not Currently       Objective:     Vital Signs (Most Recent):  Temp: 97.9 °F (36.6 °C) (10/05/19 1808)  Pulse: 63 (10/05/19 2000)  Resp: (!) 28 (10/05/19 2000)  BP: 134/60 (10/05/19 2000)  SpO2: (!) 91 % (10/05/19 2000) Vital Signs (24h Range):  Temp:  [97.9 °F (36.6 °C)-98 °F (36.7 °C)] 97.9 °F (36.6 °C)  Pulse:  [61-95] 63  Resp:  [10-29] 28  SpO2:  [86 %-98 %] 91 %  BP: (122-179)/() 134/60     Weight: 108.7 kg (239 lb 10.2 oz)  Body mass index is 39.88 kg/m².           Significant Labs:   CBC:   Recent Labs   Lab 10/05/19  1135   WBC 11.99   HGB 14.5   HCT 45.2        CMP:   Recent Labs   Lab 10/05/19  1135      K 3.8      CO2 23   *   BUN 13   CREATININE 1.2   CALCIUM 9.1   PROT 7.4   ALBUMIN 3.5   BILITOT 0.7   ALKPHOS 108   AST 25   ALT 20   ANIONGAP 12   EGFRNONAA 43.4*     Cardiac Markers:   Recent Labs   Lab 10/05/19  1135   *     Lipid Panel: No results for input(s): CHOL, HDL, LDLCALC, TRIG, CHOLHDL in the last 48  hours.  Magnesium: No results for input(s): MG in the last 48 hours.  Troponin:   Recent Labs   Lab 10/05/19  1135 10/05/19  1342   TROPONINI 0.21 0.76*     TSH:   Recent Labs   Lab 04/23/19  0715   TSH 0.277*       Significant Imaging: I have reviewed all pertinent imaging results/findings within the past 24 hours.     X-ray chest shows no acute chest disease, cardiomegaly        Assessment/Plan:      * NSTEMI (non-ST elevated myocardial infarction)  NSTEMI, transferred from [facility] accepted by Dr. Maradiaga  Currently undergoing angiogram with Dr. Maradiaga         CHF (congestive heart failure)  Unsure if pt has a systolic or diastolic heart failure as no previous echo available  Dr Maradiaga to review pt's chart at the office and order accordingly if needed  No IVF  Monitor fluid status  Monitor Electrolytes and replace accordingly, Physicians orders for electrolyte sliding scale faxed to the Pharmacy  Ordered Mag stat       Chronic a-fib  On sotalol, continue meds      Dementia with 1 episode of behavioral disturbance  Chronic medical condition  Continue home medication  Daughter reported 1 episode of behavior disturbance when her nursing home physician.  All her depression medication he and patient tried to leave the facility, patient has a monitoring ankle it on her right ankle      Coronary artery disease with approx 8 stents in the past  Known CAD with 8 stents at least per the daughter  Unsure when her last echo was         Chest pain  See NSTEMI       Diabetes mellitus, type 2  Chronic medical condition  Accu-Cheks a.c. HS  Diabetic diet      COPD (chronic obstructive pulmonary disease) on home oxygen  Chronic medical condition  On home oxygen, daughters does not know how much Liters of oxygen she is on  Continue home medications  Xopenex nebulization  D/C albuterol neb as patient has history of CHF and AFib      Hyperlipidemia  Chronic medical condition  Rosuvastatin 40 q.h.s.        HTN  (hypertension)  Chronic medical condition  Continue home medications          VTE Risk Mitigation (From admission, onward)         Ordered     heparin (porcine) injection  As needed (PRN)      10/06/19 1041     enoxaparin injection 40 mg  Daily      10/05/19 2107     IP VTE HIGH RISK PATIENT  Once      10/05/19 2107                      Ole Sy MD  Department of Hospital Medicine   [facility]

## 2019-10-06 NOTE — ASSESSMENT & PLAN NOTE
NSTEMI, transferred from [facility] accepted by Dr. Maradiaga  Currently undergoing angiogram with Dr. Maradiaga

## 2019-10-06 NOTE — CONSULTS
[facility]  Cardiology  Consult Note    Patient Name: Katy Melchor  MRN: [id number]  Admission Date: 10/5/2019  Hospital Length of Stay: 0 days  Code Status: DNR   Attending Provider: Vini Fountain MD   Consulting Provider: Maikel Maradiaga MD  Primary Care Physician: Julius Nguyen MD  Principal Problem:NSTEMI (non-ST elevated myocardial infarction)    Patient information was obtained from relative(s) and ER records.     Inpatient consult to Cardiology  Consult performed by: Maikel Maradiaga MD  Consult ordered by: Vini Fountain MD        Subjective:     Chief Complaint: Chest Pain       HPI:  79-year-old lady transferred from [facility] with prolonged episode of chest pain.  Her initial troponin was normal and has peaked at 2.5.  At bedside talking to the patient she denies having any chest pain.  She has fairly advanced dementia and lives at an assisted living.  Most of the history was taken from Radha her daughter was present in the room.  There is no previous cardiac history.  We note on the chart previous medical history of atrial fibrillation diabetes and hypertension.    Past Medical History:   Diagnosis Date    Alzheimer disease     Anemia     Atrial fibrillation     Cataract     CHF (congestive heart failure)     Chronic kidney disease     Coronary artery disease     Dementia     Depression     Diabetes mellitus     GERD (gastroesophageal reflux disease)     Hyperlipidemia     Hypertension     Hypothyroidism     Osteoarthritis     Renal disorder     Spinal stenosis        Past Surgical History:   Procedure Laterality Date    CARDIAC SURGERY      pci x 8    CHOLECYSTECTOMY         Review of patient's allergies indicates:  No Known Allergies    Current Facility-Administered Medications on File Prior to Encounter   Medication    [COMPLETED] morphine injection 2 mg    [COMPLETED] morphine injection 2 mg    [COMPLETED] nitroGLYCERIN 2% TD oint ointment 0.5  inch    [COMPLETED] ondansetron injection 8 mg    [DISCONTINUED] aspirin tablet 325 mg    [DISCONTINUED] nitroGLYCERIN 2% TD oint ointment 0.5 inch     Current Outpatient Medications on File Prior to Encounter   Medication Sig    ALBUTEROL SULFATE INHL Inhale into the lungs 4 (four) times daily as needed. 2.5mg/3ml soln    amino acids/protein hydrolys (PRO-STAT SUGAR FREE ORAL) Take 30 mLs by mouth once daily.    atorvastatin (LIPITOR) 20 MG tablet Take 20 mg by mouth once daily.    bacitracin-polymyxin b (POLYSPORIN) ophthalmic ointment Place into both eyes every evening.    calcium carbonate/vitamin D3 (CALTRATE WITH VITAMIN D3 ORAL) Take 1 tablet by mouth every morning.    coenzyme Q10 (COQ-10) 100 mg capsule Take 200 mg by mouth once daily.    cyanocobalamin 1,000 mcg/mL injection 1,000 mcg every 30 days.    donepezil (ARICEPT) 10 MG tablet Take 10 mg by mouth every evening.    escitalopram oxalate (LEXAPRO) 20 MG tablet Take 20 mg by mouth once daily.    fluticasone propionate (FLOVENT DISKUS) 50 mcg/actuation DsDv Inhale 1 spray into the lungs daily as needed. Controller    fluticasone-umeclidin-vilanter (TRELEGY ELLIPTA) 100-62.5-25 mcg DsDv Inhale 1 puff into the lungs once daily.    guaifenesin 100 mg/5 ml (ROBITUSSIN) 100 mg/5 mL syrup Take 200 mg by mouth 4 (four) times daily as needed for Cough.    levalbuterol (XOPENEX HFA) 45 mcg/actuation inhaler Inhale 2 puffs into the lungs 2 (two) times daily. Rescue    levothyroxine (SYNTHROID) 137 MCG Tab tablet Take by mouth before breakfast.    loratadine (CLARITIN) 10 mg tablet Take 10 mg by mouth once daily.    memantine (NAMENDA) 10 MG Tab Take 5 mg by mouth 2 (two) times daily.    multivitamin Tab Take 1 tablet by mouth once daily.    potassium chloride (KLOR-CON M20 ORAL) Take 1 tablet by mouth once daily.    SITagliptin (JANUVIA) 50 MG Tab Take 100 mg by mouth once daily.    sodium bicarbonate 650 MG tablet Take 650 mg by mouth 3  (three) times daily.    sotalol (BETAPACE) 80 MG tablet Take 40 mg by mouth 2 (two) times daily.    tetrahydrozoline/polyethyl gly (EYE DROPS OPHT) Apply 1 drop to eye 4 (four) times daily.    torsemide (DEMADEX) 20 MG Tab Take 20 mg by mouth once daily.     Family History     Problem Relation (Age of Onset)    COPD Mother    Cancer Sister    Diabetes Mother    Heart disease Mother, Father    Hyperlipidemia Mother    Hypertension Mother        Tobacco Use    Smoking status: Former Smoker    Tobacco comment: Right in 1986   Substance and Sexual Activity    Alcohol use: Never     Frequency: Never    Drug use: Never    Sexual activity: Not Currently     ROS  Objective:     Vital Signs (Most Recent):  Temp: 98.4 °F (36.9 °C) (10/06/19 0750)  Pulse: 66 (10/06/19 0805)  Resp: 18 (10/06/19 0805)  BP: 130/62 (10/06/19 0750)  SpO2: (!) 94 % (10/06/19 0802) Vital Signs (24h Range):  Temp:  [97.8 °F (36.6 °C)-98.6 °F (37 °C)] 98.4 °F (36.9 °C)  Pulse:  [61-95] 66  Resp:  [10-29] 18  SpO2:  [86 %-98 %] 94 %  BP: (114-179)/() 130/62     Weight: 109.5 kg (241 lb 6.5 oz)  Body mass index is 40.17 kg/m².    SpO2: (!) 94 %  O2 Device (Oxygen Therapy): nasal cannula    No intake or output data in the 24 hours ending 10/06/19 0846    Lines/Drains/Airways     Drain                 Urethral Catheter 10/05/19 1145 16 Fr. less than 1 day          Peripheral Intravenous Line                 Peripheral IV - Single Lumen 10/05/19 1140 20 G Right Forearm less than 1 day                Physical Exam    Significant Labs:   CMP   Recent Labs   Lab 10/05/19  1135 10/06/19  0449    138   K 3.8 4.1    101   CO2 23 30*   * 152*   BUN 13 14   CREATININE 1.2 1.2   CALCIUM 9.1 9.3   PROT 7.4  --    ALBUMIN 3.5  --    BILITOT 0.7  --    ALKPHOS 108  --    AST 25  --    ALT 20  --    ANIONGAP 12 7*   ESTGFRAFRICA 50.0* 50.0*   EGFRNONAA 43.4* 43.4*   , CBC   Recent Labs   Lab 10/05/19  1135 10/06/19  0449   WBC 11.99  13.23*   HGB 14.5 13.1   HCT 45.2 42.4    246    and Troponin   Recent Labs   Lab 10/05/19  1135 10/05/19  1342 10/05/19  2000   TROPONINI 0.21 0.76* 2.342*       Significant Imaging: EKG: ECG demonstrates atrial fibrillation and evolving changes of infarction on the inferior leads  Assessment and Plan:  Acute non ST elevation inferior myocardial infarction so far appears to be uncomplicated  Chronic atrial fibrillation  Dementia  Plan  I discussed the case in detail with the daughter Radha my recommendations are from it is reasonable to treat her with medical management.  If they choose otherwise I would recommend angiography and if necessary stenting of a single vessel.  I would not recommend coronary artery bypass grafting.  At the time of dictation the family is discussing which may want to go.     Active Diagnoses:    Diagnosis Date Noted POA    PRINCIPAL PROBLEM:  NSTEMI (non-ST elevated myocardial infarction) [I21.4] 10/05/2019 Yes    HTN (hypertension) [I10] 10/05/2019 Yes    Hyperlipidemia [E78.5] 10/05/2019 Yes    COPD (chronic obstructive pulmonary disease) on home oxygen [J44.9] 10/05/2019 Yes    Diabetes mellitus, type 2 [E11.9] 10/05/2019 Yes    Chest pain [R07.9] 10/05/2019 Yes    Coronary artery disease with approx 8 stents in the past [I25.10] 10/05/2019 Yes    Dementia with 1 episode of behavioral disturbance [F03.91] 10/05/2019 Yes    Chronic a-fib [I48.20] 10/05/2019 Yes    CHF (congestive heart failure) [I50.9] 10/05/2019 Yes      Problems Resolved During this Admission:       VTE Risk Mitigation (From admission, onward)         Ordered     enoxaparin injection 40 mg  Daily      10/05/19 2107     IP VTE HIGH RISK PATIENT  Once      10/05/19 2107                Thank you for your consult. I will follow-up with patient. Please contact us if you have any additional questions.    Maikel Maradiaga MD  Cardiology   [facility]

## 2019-10-06 NOTE — HOSPITAL COURSE
10/6 -  in cath lab with Dr. Maradiaga at the time of my visit    10/7 - s/p DAPHNE x1 to PDA, family member at bedside reports she has been doing well overnight, no issues. Has not complained of any cp or sob.     10/08:  No new complaints.  Patient is asymptomatic, chest pain free.  Cardiology cleared her for discharge. She was discharged on dual antiplatelets, small dose of lisinopril.  No anticoagulation was prescribed upon discharge due to use of dual antiplatelets and risk of bleed.     Review of systems:  Negative except mentioned above    Physical Exam   Constitutional: She appears well-developed and well-nourished.   HENT:   Head: Normocephalic and atraumatic.   Eyes: Pupils are equal, round, and reactive to light.   Neck: Normal range of motion. Neck supple.   Cardiovascular: Normal rate and regular rhythm.   Pulmonary/Chest: Effort normal. No respiratory distress.   Abdominal: Soft. Bowel sounds are normal. She exhibits no distension. There is no tenderness. There is no guarding.   Neurological: She is alert. No cranial nerve deficit or sensory deficit. She exhibits normal muscle tone.   Skin: Skin is warm and dry. Capillary refill takes less than 2 seconds.

## 2019-10-06 NOTE — HPI
79-year-old white female transferred from [facility] for NSTEMI, patient is a resident of [facility] and has a past medical history of dementia with 1 episode of behavioral issue, diabetes type 2 (uncontrolled per daughter), hypertension, hyperlipidemia, coronary artery disease with history of more than 8 stents placed, COPD, depression  Upon reviewing the ER records noted patient was transferred to [facility] with complaint of chest pain, chest heaviness and pressure for 2 days.  When I examined the patient patient denied chest pain and reported she does not recall why she is in the ER.  Daughters also collaborated her story stating that the nursing home did informed them of the chest pain complain  In the ER patient initial cardiac enzymes were negative but the 2nd set of enzyme had a bump in troponin 2.7 with no changes on EKG.  Physician at [place] [facility] consulted with Dr. Maradiaga and patient was accepted and transferred to [facility]

## 2019-10-06 NOTE — PLAN OF CARE
Cardiac, vital signs, and lab monitoring.  IV hydration  Possible discharge in am.  MD consult in am.  Increase activity as tolerated.  Bed alarm on.  Watch for falls.  Watch for signs and symptoms of bleeding.   EKG in am.  NPO after midnight.  Accuchecks per order. Breathing treatments and adjust oxygen as needed.  Strict I&O.  Daily weights.

## 2019-10-06 NOTE — H&P
[facility] Medicine  History & Physical    Patient Name: Katy Melchor  MRN: [id number]  Admission Date: 10/5/2019  Attending Physician: Vini Fountain MD  Primary Care Provider: Julius Nguyen MD         Patient information was obtained from relative(s), nursing home and ER records.     Subjective:     Principal Problem:NSTEMI (non-ST elevated myocardial infarction)    Chief Complaint:   Chief Complaint   Patient presents with    Chest Pain     Transfer from [facility] Chest Pain, NSTEMI        HPI: 79-year-old white female transferred from [facility] for NSTEMI, patient is a resident of [facility] and has a past medical history of dementia with 1 episode of behavioral issue, diabetes type 2 (uncontrolled per daughter), hypertension, hyperlipidemia, coronary artery disease with history of more than 8 stents placed, COPD, depression  Upon reviewing the ER records noted patient was transferred to [facility] with complaint of chest pain, chest heaviness and pressure for 2 days.  When I examined the patient patient denied chest pain and reported she does not recall why she is in the ER.  Daughters also collaborated her story stating that the nursing home did informed them of the chest pain complain  In the ER patient initial cardiac enzymes were negative but the 2nd set of enzyme had a bump in troponin 2.7 with no changes on EKG.  Physician at [place] ER consulted with Dr. Maradiaga and patient was accepted and transferred to [facility]    No new subjective & objective note has been filed under this hospital service since the last note was generated.    Assessment/Plan:     * NSTEMI (non-ST elevated myocardial infarction)  NSTEMI, transferred from [facility] accepted by Dr. Maradiaga  Cardiology Dr. Maradiaga consulted  Third set of cardiac enzyme ordered  EKG ordered for am per Dr Maradiaga recommendation  Patient is established with Dr. aRlph Mattson, last seen a  month ago, patient or daughters could not recall when was her last echocardiogram, Dr Maradiaga advised to hold the order for echocardiogram as he will review pt's chart and advise accordingly  NPO after midnight for possible intervention if needed tomorrow  Lovenox 40 mg bid s/q  NTG 0.4 S/L prn    Chest pain  See NSTEMI     Coronary artery disease with approx 8 stents in the past  Known CAD with 8 stents at least per the daughter  Unsure when her last echo was     HTN (hypertension)  Chronic medical condition  Continue home medications    Hyperlipidemia  Chronic medical condition  Rosuvastatin 40 q.h.s.  FLP in a.m.    Diabetes mellitus, type 2  Chronic medical condition  Daughter reports patient is noncompliant with her diet  A1c in a.m.  Insulin sliding scale low  Accu-Cheks a.c. HS  Strict diabetic diet now, NPO after midnight    COPD (chronic obstructive pulmonary disease) on home oxygen  Chronic medical condition  On home oxygen, daughters does not know how much Liters of oxygen she is on  Continue home medications  Xopenex nebulization  D/C albuterol neb as patient has history of CHF and AFib    Dementia with 1 episode of behavioral disturbance  Chronic medical condition  Continue home medication  Daughter reported 1 episode of behavior disturbance when her nursing home physician.  All her depression medication he and patient tried to leave the facility, patient has a monitoring ankle it on her right ankle    Chronic a-fib  On sotalol, continue meds    CHF (congestive heart failure)  Unsure if pt has a systolic or diastolic heart failure as no previous echo available  Dr Maradiaga to review pt's chart at the office and order accordingly if needed  No IVF  Monitor fluid status  Monitor Electrolytes and replace accordingly, Physicians orders for electrolyte sliding scale faxed to the Pharmacy  Ordered Mag stat         VTE Risk Mitigation (From admission, onward)    Tian Fountain MD  Department  of [facility] Medicine   [facility]

## 2019-10-06 NOTE — ASSESSMENT & PLAN NOTE
Unsure if pt has a systolic or diastolic heart failure as no previous echo available  Dr Maradiaga to review pt's chart at the office and order accordingly if needed  No IVF  Monitor fluid status  Monitor Electrolytes and replace accordingly, Physicians orders for electrolyte sliding scale faxed to the Pharmacy  Ordered Mag stat

## 2019-10-06 NOTE — ASSESSMENT & PLAN NOTE
Chronic medical condition  Continue home medication  Daughter reported 1 episode of behavior disturbance when her nursing home physician.  All her depression medication he and patient tried to leave the facility, patient has a monitoring ankle it on her right ankle

## 2019-10-06 NOTE — SUBJECTIVE & OBJECTIVE
Past Medical History:   Diagnosis Date    Alzheimer disease     Anemia     Atrial fibrillation     Cataract     CHF (congestive heart failure)     Chronic kidney disease     Coronary artery disease     Dementia     Depression     Diabetes mellitus     GERD (gastroesophageal reflux disease)     Hyperlipidemia     Hypertension     Hypothyroidism     Osteoarthritis     Renal disorder     Spinal stenosis        Past Surgical History:   Procedure Laterality Date    CARDIAC SURGERY      pci x 8    CHOLECYSTECTOMY         Review of patient's allergies indicates:  No Known Allergies    Current Facility-Administered Medications on File Prior to Encounter   Medication    [COMPLETED] morphine injection 2 mg    [COMPLETED] morphine injection 2 mg    [COMPLETED] nitroGLYCERIN 2% TD oint ointment 0.5 inch    [COMPLETED] ondansetron injection 8 mg    [DISCONTINUED] aspirin tablet 325 mg    [DISCONTINUED] nitroGLYCERIN 2% TD oint ointment 0.5 inch     Current Outpatient Medications on File Prior to Encounter   Medication Sig    ALBUTEROL SULFATE INHL Inhale into the lungs 4 (four) times daily as needed. 2.5mg/3ml soln    amino acids/protein hydrolys (PRO-STAT SUGAR FREE ORAL) Take 30 mLs by mouth once daily.    atorvastatin (LIPITOR) 20 MG tablet Take 20 mg by mouth once daily.    bacitracin-polymyxin b (POLYSPORIN) ophthalmic ointment Place into both eyes every evening.    calcium carbonate/vitamin D3 (CALTRATE WITH VITAMIN D3 ORAL) Take 1 tablet by mouth every morning.    coenzyme Q10 (COQ-10) 100 mg capsule Take 200 mg by mouth once daily.    cyanocobalamin 1,000 mcg/mL injection 1,000 mcg every 30 days.    donepezil (ARICEPT) 10 MG tablet Take 10 mg by mouth every evening.    escitalopram oxalate (LEXAPRO) 20 MG tablet Take 20 mg by mouth once daily.    fluticasone propionate (FLOVENT DISKUS) 50 mcg/actuation DsDv Inhale 1 spray into the lungs daily as needed. Controller     fluticasone-umeclidin-vilanter (TRELEGY ELLIPTA) 100-62.5-25 mcg DsDv Inhale 1 puff into the lungs once daily.    guaifenesin 100 mg/5 ml (ROBITUSSIN) 100 mg/5 mL syrup Take 200 mg by mouth 4 (four) times daily as needed for Cough.    levalbuterol (XOPENEX HFA) 45 mcg/actuation inhaler Inhale 2 puffs into the lungs 2 (two) times daily. Rescue    levothyroxine (SYNTHROID) 137 MCG Tab tablet Take by mouth before breakfast.    loratadine (CLARITIN) 10 mg tablet Take 10 mg by mouth once daily.    memantine (NAMENDA) 10 MG Tab Take 5 mg by mouth 2 (two) times daily.    multivitamin Tab Take 1 tablet by mouth once daily.    potassium chloride (KLOR-CON M20 ORAL) Take 1 tablet by mouth once daily.    SITagliptin (JANUVIA) 50 MG Tab Take 100 mg by mouth once daily.    sodium bicarbonate 650 MG tablet Take 650 mg by mouth 3 (three) times daily.    sotalol (BETAPACE) 80 MG tablet Take 40 mg by mouth 2 (two) times daily.    tetrahydrozoline/polyethyl gly (EYE DROPS OPHT) Apply 1 drop to eye 4 (four) times daily.    torsemide (DEMADEX) 20 MG Tab Take 20 mg by mouth once daily.     Family History     Problem Relation (Age of Onset)    COPD Mother    Cancer Sister    Diabetes Mother    Heart disease Mother, Father    Hyperlipidemia Mother    Hypertension Mother        Tobacco Use    Smoking status: Former Smoker    Tobacco comment: Right in 1986   Substance and Sexual Activity    Alcohol use: Never     Frequency: Never    Drug use: Never    Sexual activity: Not Currently       Objective:     Vital Signs (Most Recent):  Temp: 97.9 °F (36.6 °C) (10/05/19 1808)  Pulse: 63 (10/05/19 2000)  Resp: (!) 28 (10/05/19 2000)  BP: 134/60 (10/05/19 2000)  SpO2: (!) 91 % (10/05/19 2000) Vital Signs (24h Range):  Temp:  [97.9 °F (36.6 °C)-98 °F (36.7 °C)] 97.9 °F (36.6 °C)  Pulse:  [61-95] 63  Resp:  [10-29] 28  SpO2:  [86 %-98 %] 91 %  BP: (122-179)/() 134/60     Weight: 108.7 kg (239 lb 10.2 oz)  Body mass index is  39.88 kg/m².           Significant Labs:   CBC:   Recent Labs   Lab 10/05/19  1135   WBC 11.99   HGB 14.5   HCT 45.2        CMP:   Recent Labs   Lab 10/05/19  1135      K 3.8      CO2 23   *   BUN 13   CREATININE 1.2   CALCIUM 9.1   PROT 7.4   ALBUMIN 3.5   BILITOT 0.7   ALKPHOS 108   AST 25   ALT 20   ANIONGAP 12   EGFRNONAA 43.4*     Cardiac Markers:   Recent Labs   Lab 10/05/19  1135   *     Lipid Panel: No results for input(s): CHOL, HDL, LDLCALC, TRIG, CHOLHDL in the last 48 hours.  Magnesium: No results for input(s): MG in the last 48 hours.  Troponin:   Recent Labs   Lab 10/05/19  1135 10/05/19  1342   TROPONINI 0.21 0.76*     TSH:   Recent Labs   Lab 04/23/19  0715   TSH 0.277*       Significant Imaging: I have reviewed all pertinent imaging results/findings within the past 24 hours.     X-ray chest shows no acute chest disease, cardiomegaly

## 2019-10-06 NOTE — CARE UPDATE
10/06/19 0805   Patient Assessment/Suction   Level of Consciousness (AVPU) alert   Respiratory Effort Normal;Unlabored   All Lung Fields Breath Sounds diminished   PRE-TX-O2   O2 Device (Oxygen Therapy) nasal cannula   Flow (L/min) 3   Pulse Oximetry Type Intermittent   $ Pulse Oximetry - Multiple Charge Pulse Oximetry - Multiple   Pulse 66   Resp 18   Positioning HOB elevated 30 degrees   Aerosol Therapy   $ Aerosol Therapy Charges Aerosol Treatment   Daily Review of Necessity (SVN) completed   Respiratory Treatment Status (SVN) given   Treatment Route (SVN) mask;oxygen   Patient Position (SVN) HOB elevated   Post Treatment Assessment (SVN) patient reports no change in breathing   Signs of Intolerance (SVN) none   Breath Sounds Post-Respiratory Treatment   Throughout All Fields Post-Treatment All Fields   Post-treatment Heart Rate (beats/min) 66   Post-treatment Resp Rate (breaths/min) 16

## 2019-10-07 LAB
ANION GAP SERPL CALC-SCNC: 6 MMOL/L (ref 8–16)
BASOPHILS # BLD AUTO: 0.05 K/UL (ref 0–0.2)
BASOPHILS NFR BLD: 0.5 % (ref 0–1.9)
BUN SERPL-MCNC: 18 MG/DL (ref 8–23)
CALCIUM SERPL-MCNC: 8.4 MG/DL (ref 8.7–10.5)
CHLORIDE SERPL-SCNC: 102 MMOL/L (ref 95–110)
CK MB SERPL-MCNC: 26.3 NG/ML (ref 0.1–6.5)
CO2 SERPL-SCNC: 29 MMOL/L (ref 23–29)
CREAT SERPL-MCNC: 1.2 MG/DL (ref 0.5–1.4)
DIFFERENTIAL METHOD: ABNORMAL
EOSINOPHIL # BLD AUTO: 0.2 K/UL (ref 0–0.5)
EOSINOPHIL NFR BLD: 1.6 % (ref 0–8)
ERYTHROCYTE [DISTWIDTH] IN BLOOD BY AUTOMATED COUNT: 14.6 % (ref 11.5–14.5)
EST. GFR  (AFRICAN AMERICAN): 50 ML/MIN/1.73 M^2
EST. GFR  (NON AFRICAN AMERICAN): 43.4 ML/MIN/1.73 M^2
GLUCOSE SERPL-MCNC: 113 MG/DL (ref 70–110)
GLUCOSE SERPL-MCNC: 122 MG/DL (ref 70–110)
GLUCOSE SERPL-MCNC: 124 MG/DL (ref 70–110)
GLUCOSE SERPL-MCNC: 124 MG/DL (ref 70–110)
GLUCOSE SERPL-MCNC: 163 MG/DL (ref 70–110)
HCT VFR BLD AUTO: 40.1 % (ref 37–48.5)
HGB BLD-MCNC: 12.3 G/DL (ref 12–16)
IMM GRANULOCYTES # BLD AUTO: 0.05 K/UL (ref 0–0.04)
IMM GRANULOCYTES NFR BLD AUTO: 0.5 % (ref 0–0.5)
LYMPHOCYTES # BLD AUTO: 3.3 K/UL (ref 1–4.8)
LYMPHOCYTES NFR BLD: 30.7 % (ref 18–48)
MCH RBC QN AUTO: 28.1 PG (ref 27–31)
MCHC RBC AUTO-ENTMCNC: 30.7 G/DL (ref 32–36)
MCV RBC AUTO: 92 FL (ref 82–98)
MONOCYTES # BLD AUTO: 0.9 K/UL (ref 0.3–1)
MONOCYTES NFR BLD: 8.3 % (ref 4–15)
NEUTROPHILS # BLD AUTO: 6.2 K/UL (ref 1.8–7.7)
NEUTROPHILS NFR BLD: 58.4 % (ref 38–73)
NRBC BLD-RTO: 0 /100 WBC
PLATELET # BLD AUTO: 198 K/UL (ref 150–350)
PMV BLD AUTO: 10.6 FL (ref 9.2–12.9)
POTASSIUM SERPL-SCNC: 4 MMOL/L (ref 3.5–5.1)
RBC # BLD AUTO: 4.37 M/UL (ref 4–5.4)
SODIUM SERPL-SCNC: 137 MMOL/L (ref 136–145)
TROPONIN I SERPL DL<=0.01 NG/ML-MCNC: 5.99 NG/ML (ref 0.02–0.04)
WBC # BLD AUTO: 10.61 K/UL (ref 3.9–12.7)

## 2019-10-07 PROCEDURE — 25000003 PHARM REV CODE 250: Performed by: INTERNAL MEDICINE

## 2019-10-07 PROCEDURE — 84484 ASSAY OF TROPONIN QUANT: CPT

## 2019-10-07 PROCEDURE — 93005 ELECTROCARDIOGRAM TRACING: CPT

## 2019-10-07 PROCEDURE — 36415 COLL VENOUS BLD VENIPUNCTURE: CPT

## 2019-10-07 PROCEDURE — 25000242 PHARM REV CODE 250 ALT 637 W/ HCPCS: Performed by: INTERNAL MEDICINE

## 2019-10-07 PROCEDURE — 80048 BASIC METABOLIC PNL TOTAL CA: CPT

## 2019-10-07 PROCEDURE — 27000221 HC OXYGEN, UP TO 24 HOURS

## 2019-10-07 PROCEDURE — 21000000 HC CCU ICU ROOM CHARGE

## 2019-10-07 PROCEDURE — 94640 AIRWAY INHALATION TREATMENT: CPT

## 2019-10-07 PROCEDURE — 63600175 PHARM REV CODE 636 W HCPCS: Performed by: INTERNAL MEDICINE

## 2019-10-07 PROCEDURE — 85025 COMPLETE CBC W/AUTO DIFF WBC: CPT

## 2019-10-07 PROCEDURE — 82553 CREATINE MB FRACTION: CPT

## 2019-10-07 RX ORDER — ENOXAPARIN SODIUM 100 MG/ML
40 INJECTION SUBCUTANEOUS
Status: DISCONTINUED | OUTPATIENT
Start: 2019-10-07 | End: 2019-10-08 | Stop reason: HOSPADM

## 2019-10-07 RX ADMIN — SOTALOL HYDROCHLORIDE 40 MG: 80 TABLET ORAL at 09:10

## 2019-10-07 RX ADMIN — LEVALBUTEROL HYDROCHLORIDE 0.63 MG: 0.63 SOLUTION RESPIRATORY (INHALATION) at 07:10

## 2019-10-07 RX ADMIN — ESCITALOPRAM OXALATE 20 MG: 10 TABLET ORAL at 09:10

## 2019-10-07 RX ADMIN — SODIUM BICARBONATE 650 MG TABLET 650 MG: at 08:10

## 2019-10-07 RX ADMIN — LEVOTHYROXINE SODIUM 125 MCG: 0.03 TABLET ORAL at 06:10

## 2019-10-07 RX ADMIN — LEVALBUTEROL HYDROCHLORIDE 0.63 MG: 0.63 SOLUTION RESPIRATORY (INHALATION) at 08:10

## 2019-10-07 RX ADMIN — ACETAMINOPHEN 650 MG: 325 TABLET ORAL at 09:10

## 2019-10-07 RX ADMIN — ASPIRIN 81 MG: 81 TABLET, DELAYED RELEASE ORAL at 09:10

## 2019-10-07 RX ADMIN — ATORVASTATIN CALCIUM 80 MG: 40 TABLET, FILM COATED ORAL at 08:10

## 2019-10-07 RX ADMIN — SOTALOL HYDROCHLORIDE 40 MG: 80 TABLET ORAL at 08:10

## 2019-10-07 RX ADMIN — SODIUM BICARBONATE 650 MG TABLET 650 MG: at 03:10

## 2019-10-07 RX ADMIN — MEMANTINE HYDROCHLORIDE 5 MG: 5 TABLET ORAL at 08:10

## 2019-10-07 RX ADMIN — CETIRIZINE HYDROCHLORIDE 10 MG: 10 TABLET, FILM COATED ORAL at 09:10

## 2019-10-07 RX ADMIN — MEMANTINE HYDROCHLORIDE 5 MG: 5 TABLET ORAL at 09:10

## 2019-10-07 RX ADMIN — DONEPEZIL HYDROCHLORIDE 10 MG: 5 TABLET, FILM COATED ORAL at 08:10

## 2019-10-07 RX ADMIN — SODIUM BICARBONATE 650 MG TABLET 650 MG: at 09:10

## 2019-10-07 RX ADMIN — TORSEMIDE 20 MG: 20 TABLET ORAL at 09:10

## 2019-10-07 RX ADMIN — HYDROCODONE BITARTRATE AND ACETAMINOPHEN 1 TABLET: 5; 325 TABLET ORAL at 10:10

## 2019-10-07 RX ADMIN — POLYETHYLENE GLYCOL 3350 17 G: 17 POWDER, FOR SOLUTION ORAL at 09:10

## 2019-10-07 RX ADMIN — CLOPIDOGREL BISULFATE 75 MG: 75 TABLET, FILM COATED ORAL at 09:10

## 2019-10-07 RX ADMIN — ENOXAPARIN SODIUM 40 MG: 100 INJECTION SUBCUTANEOUS at 03:10

## 2019-10-07 RX ADMIN — LEVALBUTEROL HYDROCHLORIDE 0.63 MG: 0.63 SOLUTION RESPIRATORY (INHALATION) at 02:10

## 2019-10-07 NOTE — PLAN OF CARE
Fall prevention protocol remains in place. Daughter at bedside. Bed alarm active.calls for assist with needs. Able to ambulate to bathroom with one person assist. Uses walker at home. No falls thus far.  Capillary blood glucose levels being checked before meals and at bedtime. No insulin required at bedtime this shift.  Right radial angiogram site remains within normal limits.  No c/o pain verbalized.

## 2019-10-07 NOTE — PHYSICIAN QUERY
PT Name: Katy Melchor  MR #: [id number]  Physician Query Form - CKD Clarification     CDS/: Ruben Pulliam               Contact information: [phone]  This form is a permanent document in the medical record.     Query Date: October 7, 2019    By submitting this query, we are merely seeking further clarification of documentation. Please utilize your independent clinical judgment when addressing the question(s) below.    The Medical record contains the following:     Indicators   Location in Medical Record   X CKD documented Dr. Sy PMHx section of H&P   X eGFR 43.4 Labs this admission     Provider, please further specify the stage of CKD.    [   ] Chronic Kidney Disease (CKD) (please specify stage* below)      [employer] Definitions     Stage Description eGFR (mL/min)   [   ]    I Slight kidney damage with normal or increased filtration 90+   [   ]   II Mildly reduced kidney function 60-89   [ x  ]    III Moderately reduced kidney function 30-59   [   ]    IV Severely reduced kidney function 15-29   [   ]  V Kidney failure, requiring transplant or dialysis <15         [   ] CKD on Chronic Hemodialysis (please specify stage*): _________    [   ] End Stage Renal Disease (ESRD)    [   ] Other (please specify): ____________    [   ]  Clinically Undetermined          Please document in your progress notes daily for the duration of treatment until resolved and include in your discharge summary.

## 2019-10-07 NOTE — PLAN OF CARE
Pt stable, denies chest pain or SOB. Right radial angiogram site dressing CDI. Pt injury free, voices understanding of need to call for assistance before getting out of bed. Blood glucose controlled. CM notified that pt is a resident at [facility] in [facility], [place]. Family at bedside, updated on plan of care. Awaiting clearance from cardiology

## 2019-10-07 NOTE — SUBJECTIVE & OBJECTIVE
Past Medical History:   Diagnosis Date    Alzheimer disease     Anemia     Atrial fibrillation     Cataract     CHF (congestive heart failure)     Chronic kidney disease     Coronary artery disease     Dementia     Depression     Diabetes mellitus     GERD (gastroesophageal reflux disease)     Hyperlipidemia     Hypertension     Hypothyroidism     Osteoarthritis     Renal disorder     Spinal stenosis        Past Surgical History:   Procedure Laterality Date    CARDIAC SURGERY      pci x 8    CHOLECYSTECTOMY         Review of patient's allergies indicates:  No Known Allergies    No current facility-administered medications on file prior to encounter.      Current Outpatient Medications on File Prior to Encounter   Medication Sig    ALBUTEROL SULFATE INHL Inhale into the lungs 4 (four) times daily as needed. 2.5mg/3ml soln    amino acids/protein hydrolys (PRO-STAT SUGAR FREE ORAL) Take 30 mLs by mouth once daily.    atorvastatin (LIPITOR) 20 MG tablet Take 20 mg by mouth once daily.    bacitracin-polymyxin b (POLYSPORIN) ophthalmic ointment Place into both eyes every evening.    calcium carbonate/vitamin D3 (CALTRATE WITH VITAMIN D3 ORAL) Take 1 tablet by mouth every morning.    coenzyme Q10 (COQ-10) 100 mg capsule Take 200 mg by mouth once daily.    cyanocobalamin 1,000 mcg/mL injection 1,000 mcg every 30 days.    donepezil (ARICEPT) 10 MG tablet Take 10 mg by mouth every evening.    escitalopram oxalate (LEXAPRO) 20 MG tablet Take 20 mg by mouth once daily.    fluticasone propionate (FLOVENT DISKUS) 50 mcg/actuation DsDv Inhale 1 spray into the lungs daily as needed. Controller    fluticasone-umeclidin-vilanter (TRELEGY ELLIPTA) 100-62.5-25 mcg DsDv Inhale 1 puff into the lungs once daily.    guaifenesin 100 mg/5 ml (ROBITUSSIN) 100 mg/5 mL syrup Take 200 mg by mouth 4 (four) times daily as needed for Cough.    levalbuterol (XOPENEX HFA) 45 mcg/actuation inhaler Inhale 2 puffs into  the lungs 2 (two) times daily. Rescue    levothyroxine (SYNTHROID) 137 MCG Tab tablet Take by mouth before breakfast.    loratadine (CLARITIN) 10 mg tablet Take 10 mg by mouth once daily.    memantine (NAMENDA) 10 MG Tab Take 5 mg by mouth 2 (two) times daily.    multivitamin Tab Take 1 tablet by mouth once daily.    potassium chloride (KLOR-CON M20 ORAL) Take 1 tablet by mouth once daily.    SITagliptin (JANUVIA) 50 MG Tab Take 100 mg by mouth once daily.    sodium bicarbonate 650 MG tablet Take 650 mg by mouth 3 (three) times daily.    sotalol (BETAPACE) 80 MG tablet Take 40 mg by mouth 2 (two) times daily.    tetrahydrozoline/polyethyl gly (EYE DROPS OPHT) Apply 1 drop to eye 4 (four) times daily.    torsemide (DEMADEX) 20 MG Tab Take 20 mg by mouth once daily.     Family History     Problem Relation (Age of Onset)    COPD Mother    Cancer Sister    Diabetes Mother    Heart disease Mother, Father    Hyperlipidemia Mother    Hypertension Mother        Tobacco Use    Smoking status: Former Smoker    Tobacco comment: Right in 1986   Substance and Sexual Activity    Alcohol use: Never     Frequency: Never    Drug use: Never    Sexual activity: Not Currently       Objective:     Vital Signs (Most Recent):  Temp: 98.1 °F (36.7 °C) (10/07/19 1100)  Pulse: 90 (10/07/19 1434)  Resp: 16 (10/07/19 1434)  BP: (!) 100/58 (10/07/19 1100)  SpO2: 97 % (10/07/19 1434) Vital Signs (24h Range):  Temp:  [97.1 °F (36.2 °C)-98.2 °F (36.8 °C)] 98.1 °F (36.7 °C)  Pulse:  [] 90  Resp:  [16-20] 16  SpO2:  [94 %-97 %] 97 %  BP: (100-141)/(58-77) 100/58     Weight: 112.9 kg (248 lb 14.4 oz)  Body mass index is 41.42 kg/m².       Physical Exam   Constitutional: She appears well-developed and well-nourished.   HENT:   Head: Normocephalic and atraumatic.   Eyes: Pupils are equal, round, and reactive to light.   Neck: Normal range of motion. Neck supple.   Cardiovascular: Normal rate and regular rhythm.   Pulmonary/Chest:  Effort normal. No respiratory distress.   Abdominal: Soft. Bowel sounds are normal. She exhibits no distension. There is no tenderness. There is no guarding.   Neurological: She is alert. No cranial nerve deficit or sensory deficit. She exhibits normal muscle tone.   Skin: Skin is warm and dry. Capillary refill takes less than 2 seconds.         Significant Labs:   CBC:   Recent Labs   Lab 10/06/19  0449 10/07/19  0405   WBC 13.23* 10.61   HGB 13.1 12.3   HCT 42.4 40.1    198     CMP:   Recent Labs   Lab 10/06/19  0449 10/07/19  0405    137   K 4.1 4.0    102   CO2 30* 29   * 122*   BUN 14 18   CREATININE 1.2 1.2   CALCIUM 9.3 8.4*   ANIONGAP 7* 6*   EGFRNONAA 43.4* 43.4*     Lipid Panel:   Recent Labs   Lab 10/06/19  0449   CHOL 134  134   HDL 39*  39*   LDLCALC 71.8  71.8   TRIG 116  116   CHOLHDL 29.1  29.1     Magnesium:   Recent Labs   Lab 10/05/19  2128 10/06/19  0449   MG 2.3 2.2     Troponin:   Recent Labs   Lab 10/05/19  2000 10/07/19  0405   TROPONINI 2.342* 5.994*     TSH:   Recent Labs   Lab 10/06/19  0449   TSH 0.100*       Significant Imaging: I have reviewed all pertinent imaging results/findings within the past 24 hours.

## 2019-10-07 NOTE — ASSESSMENT & PLAN NOTE
NSTEMI, transferred from [facility] accepted by Dr. Maradiaga  Underwent angiogram per Dr. Maradiaga, DAPHNE x1 ro PDA  Cont current medical management

## 2019-10-07 NOTE — PROGRESS NOTES
[facility] Medicine  Progress Note    Patient Name: Katy Melchor  MRN: [id number]  Patient Class: IP- Inpatient   Admission Date: 10/5/2019  Length of Stay: 1 days  Attending Physician: Ole Sy MD  Primary Care Provider: Julius Nguyen MD        Subjective:     Principal Problem:NSTEMI (non-ST elevated myocardial infarction)        HPI:  79-year-old white female transferred from [facility] for NSTEMI, patient is a resident of [facility] and has a past medical history of dementia with 1 episode of behavioral issue, diabetes type 2 (uncontrolled per daughter), hypertension, hyperlipidemia, coronary artery disease with history of more than 8 stents placed, COPD, depression  Upon reviewing the ER records noted patient was transferred to [facility] with complaint of chest pain, chest heaviness and pressure for 2 days.  When I examined the patient patient denied chest pain and reported she does not recall why she is in the ER.  Daughters also collaborated her story stating that the nursing home did informed them of the chest pain complain  In the ER patient initial cardiac enzymes were negative but the 2nd set of enzyme had a bump in troponin 2.7 with no changes on EKG.  Physician at [place] [facility] consulted with Dr. Maradiaga and patient was accepted and transferred to [facility]    Overview/Hospital Course:  10/6 -  in cath lab with Dr. Maradiaga at the time of my visit    10/7 - s/p DAPHNE x1 to PDA, family member at bedside reports she has been doing well overnight, no issues. Has not complained of any cp or sob.     Past Medical History:   Diagnosis Date    Alzheimer disease     Anemia     Atrial fibrillation     Cataract     CHF (congestive heart failure)     Chronic kidney disease     Coronary artery disease     Dementia     Depression     Diabetes mellitus     GERD (gastroesophageal reflux disease)     Hyperlipidemia     Hypertension     Hypothyroidism      Osteoarthritis     Renal disorder     Spinal stenosis        Past Surgical History:   Procedure Laterality Date    CARDIAC SURGERY      pci x 8    CHOLECYSTECTOMY         Review of patient's allergies indicates:  No Known Allergies    No current facility-administered medications on file prior to encounter.      Current Outpatient Medications on File Prior to Encounter   Medication Sig    ALBUTEROL SULFATE INHL Inhale into the lungs 4 (four) times daily as needed. 2.5mg/3ml soln    amino acids/protein hydrolys (PRO-STAT SUGAR FREE ORAL) Take 30 mLs by mouth once daily.    atorvastatin (LIPITOR) 20 MG tablet Take 20 mg by mouth once daily.    bacitracin-polymyxin b (POLYSPORIN) ophthalmic ointment Place into both eyes every evening.    calcium carbonate/vitamin D3 (CALTRATE WITH VITAMIN D3 ORAL) Take 1 tablet by mouth every morning.    coenzyme Q10 (COQ-10) 100 mg capsule Take 200 mg by mouth once daily.    cyanocobalamin 1,000 mcg/mL injection 1,000 mcg every 30 days.    donepezil (ARICEPT) 10 MG tablet Take 10 mg by mouth every evening.    escitalopram oxalate (LEXAPRO) 20 MG tablet Take 20 mg by mouth once daily.    fluticasone propionate (FLOVENT DISKUS) 50 mcg/actuation DsDv Inhale 1 spray into the lungs daily as needed. Controller    fluticasone-umeclidin-vilanter (TRELEGY ELLIPTA) 100-62.5-25 mcg DsDv Inhale 1 puff into the lungs once daily.    guaifenesin 100 mg/5 ml (ROBITUSSIN) 100 mg/5 mL syrup Take 200 mg by mouth 4 (four) times daily as needed for Cough.    levalbuterol (XOPENEX HFA) 45 mcg/actuation inhaler Inhale 2 puffs into the lungs 2 (two) times daily. Rescue    levothyroxine (SYNTHROID) 137 MCG Tab tablet Take by mouth before breakfast.    loratadine (CLARITIN) 10 mg tablet Take 10 mg by mouth once daily.    memantine (NAMENDA) 10 MG Tab Take 5 mg by mouth 2 (two) times daily.    multivitamin Tab Take 1 tablet by mouth once daily.    potassium chloride (KLOR-CON M20 ORAL)  Take 1 tablet by mouth once daily.    SITagliptin (JANUVIA) 50 MG Tab Take 100 mg by mouth once daily.    sodium bicarbonate 650 MG tablet Take 650 mg by mouth 3 (three) times daily.    sotalol (BETAPACE) 80 MG tablet Take 40 mg by mouth 2 (two) times daily.    tetrahydrozoline/polyethyl gly (EYE DROPS OPHT) Apply 1 drop to eye 4 (four) times daily.    torsemide (DEMADEX) 20 MG Tab Take 20 mg by mouth once daily.     Family History     Problem Relation (Age of Onset)    COPD Mother    Cancer Sister    Diabetes Mother    Heart disease Mother, Father    Hyperlipidemia Mother    Hypertension Mother        Tobacco Use    Smoking status: Former Smoker    Tobacco comment: Right in 1986   Substance and Sexual Activity    Alcohol use: Never     Frequency: Never    Drug use: Never    Sexual activity: Not Currently       Objective:     Vital Signs (Most Recent):  Temp: 98.1 °F (36.7 °C) (10/07/19 1100)  Pulse: 90 (10/07/19 1434)  Resp: 16 (10/07/19 1434)  BP: (!) 100/58 (10/07/19 1100)  SpO2: 97 % (10/07/19 1434) Vital Signs (24h Range):  Temp:  [97.1 °F (36.2 °C)-98.2 °F (36.8 °C)] 98.1 °F (36.7 °C)  Pulse:  [] 90  Resp:  [16-20] 16  SpO2:  [94 %-97 %] 97 %  BP: (100-141)/(58-77) 100/58     Weight: 112.9 kg (248 lb 14.4 oz)  Body mass index is 41.42 kg/m².       Physical Exam   Constitutional: She appears well-developed and well-nourished.   HENT:   Head: Normocephalic and atraumatic.   Eyes: Pupils are equal, round, and reactive to light.   Neck: Normal range of motion. Neck supple.   Cardiovascular: Normal rate and regular rhythm.   Pulmonary/Chest: Effort normal. No respiratory distress.   Abdominal: Soft. Bowel sounds are normal. She exhibits no distension. There is no tenderness. There is no guarding.   Neurological: She is alert. No cranial nerve deficit or sensory deficit. She exhibits normal muscle tone.   Skin: Skin is warm and dry. Capillary refill takes less than 2 seconds.         Significant  Labs:   CBC:   Recent Labs   Lab 10/06/19  0449 10/07/19  0405   WBC 13.23* 10.61   HGB 13.1 12.3   HCT 42.4 40.1    198     CMP:   Recent Labs   Lab 10/06/19  0449 10/07/19  0405    137   K 4.1 4.0    102   CO2 30* 29   * 122*   BUN 14 18   CREATININE 1.2 1.2   CALCIUM 9.3 8.4*   ANIONGAP 7* 6*   EGFRNONAA 43.4* 43.4*     Lipid Panel:   Recent Labs   Lab 10/06/19  0449   CHOL 134  134   HDL 39*  39*   LDLCALC 71.8  71.8   TRIG 116  116   CHOLHDL 29.1  29.1     Magnesium:   Recent Labs   Lab 10/05/19  2128 10/06/19  0449   MG 2.3 2.2     Troponin:   Recent Labs   Lab 10/05/19  2000 10/07/19  0405   TROPONINI 2.342* 5.994*     TSH:   Recent Labs   Lab 10/06/19  0449   TSH 0.100*       Significant Imaging: I have reviewed all pertinent imaging results/findings within the past 24 hours.         Assessment/Plan:      * NSTEMI (non-ST elevated myocardial infarction)  NSTEMI, transferred from [facility] accepted by Dr. Maradiaga  Underwent angiogram per Dr. Maradiaga, DAPHNE x1 ro PDA  Cont current medical management         CHF (congestive heart failure)  Unsure if pt has a systolic or diastolic heart failure as no previous echo available  Dr Maradiaga to review pt's chart at the office and order accordingly if needed  No IVF  Monitor fluid status  Monitor Electrolytes and replace accordingly, Physicians orders for electrolyte sliding scale faxed to the Pharmacy  Ordered Mag stat       Chronic a-fib  On sotalol, continue meds      Dementia with 1 episode of behavioral disturbance  Chronic medical condition  Continue home medication  Daughter reported 1 episode of behavior disturbance when her nursing home physician.  All her depression medication he and patient tried to leave the facility, patient has a monitoring ankle it on her right ankle      Coronary artery disease with approx 8 stents in the past  Known CAD with 8 stents at least per the daughter  Unsure when her last echo was          Chest pain  See NSTEMI       Diabetes mellitus, type 2  Chronic medical condition  Phillu-Fabi garcia HS  Diabetic diet      COPD (chronic obstructive pulmonary disease) on home oxygen  Chronic medical condition  On home oxygen, daughters does not know how much Liters of oxygen she is on  Continue home medications  Xopenex nebulization  D/C albuterol neb as patient has history of CHF and AFib      Hyperlipidemia  Chronic medical condition  Rosuvastatin 40 q.h.s.        HTN (hypertension)  Chronic medical condition  Continue home medications          VTE Risk Mitigation (From admission, onward)         Ordered     enoxaparin injection 40 mg  Every 12 hours (non-standard times)      10/07/19 1110     heparin (porcine) injection  As needed (PRN)      10/06/19 1041     IP VTE HIGH RISK PATIENT  Once      10/05/19 4276                      Ole Sy MD  Department of Hospital Medicine   [facility] hospital

## 2019-10-07 NOTE — PLAN OF CARE
10/06/19 1951   Patient Assessment/Suction   Respiratory Effort Unlabored   TABATHA Breath Sounds crackles   Rhythm/Pattern, Respiratory pattern regular   PRE-TX-O2   O2 Device (Oxygen Therapy) nasal cannula   Flow (L/min) 3   SpO2 95 %   Pulse Oximetry Type Continuous   Pulse 108   Resp 20   Temp 98.2 °F (36.8 °C)   BP (!) 111/59   Positioning   Body Position position maintained   Aerosol Therapy   $ Aerosol Therapy Charges Aerosol Treatment   Respiratory Treatment Status (SVN) given   Treatment Route (SVN) mask   Patient Position (SVN) semi-Acosta's   Post Treatment Assessment (SVN) breath sounds unchanged   Signs of Intolerance (SVN) none   Breath Sounds Post-Respiratory Treatment   Throughout All Fields Post-Treatment All Fields   Throughout All Fields Post-Treatment no change   Post-treatment Heart Rate (beats/min) 108   Post-treatment Resp Rate (breaths/min) 20

## 2019-10-07 NOTE — PROGRESS NOTES
Cardiac Rehab     Katy Melchor   [id number]   10/7/2019         Cardiac Rehab Phase Taught: Phase 1    Teaching Method: Verbal    Handouts: Stent Card    Educational Videos: None    Understanding:  History of Previous Information Given, Knowledge indicated by feedback, Learning indicated by feedback and Verbalize understanding    Comments: pt in bed, drowsy. Daughter at bedside. Stent card given. Daughter states she is unsure what foods are given at nursing home, discussed low salt diet. They do check daily weights    Total time spent: 15mins            Angelina Hendricks RN

## 2019-10-07 NOTE — PROGRESS NOTES
Pharmacist Renal Dose Adjustment Note    Katy Melchor is a 79 y.o. female being treated with the medication Lovenox.    Patient Data:    Vital Signs (Most Recent):  Temp: 97.1 °F (36.2 °C) (10/07/19 0400)  Pulse: 93 (10/07/19 0743)  Resp: 16 (10/07/19 0743)  BP: 111/65 (10/07/19 0400)  SpO2: (!) 94 % (10/07/19 0743)   Vital Signs (72h Range):  Temp:  [97.1 °F (36.2 °C)-98.6 °F (37 °C)]   Pulse:  []   Resp:  [10-29]   BP: ()/()   SpO2:  [86 %-98 %]      Recent Labs   Lab 10/05/19  1135 10/06/19  0449 10/07/19  0405   CREATININE 1.2 1.2 1.2     Serum creatinine: 1.2 mg/dL 10/07/19 0405  Estimated creatinine clearance: 48.4 mL/min (CrCl > 30 mL/min)  BMI > 40    Lovenox 40 mg subq daily will be changed to Lovenox 40 mg subq every 12 hours per pharmacy renal dose adjustment protocol    Pharmacist's Name: Letty Sutherland  Pharmacist's Extension:  [phone]

## 2019-10-08 VITALS
BODY MASS INDEX: 41.47 KG/M2 | HEIGHT: 65 IN | WEIGHT: 248.88 LBS | HEART RATE: 64 BPM | DIASTOLIC BLOOD PRESSURE: 56 MMHG | TEMPERATURE: 99 F | RESPIRATION RATE: 17 BRPM | SYSTOLIC BLOOD PRESSURE: 114 MMHG | OXYGEN SATURATION: 97 %

## 2019-10-08 PROBLEM — R07.9 CHEST PAIN: Status: RESOLVED | Noted: 2019-10-05 | Resolved: 2019-10-08

## 2019-10-08 LAB — GLUCOSE SERPL-MCNC: 118 MG/DL (ref 70–110)

## 2019-10-08 PROCEDURE — 25000003 PHARM REV CODE 250: Performed by: INTERNAL MEDICINE

## 2019-10-08 PROCEDURE — 94761 N-INVAS EAR/PLS OXIMETRY MLT: CPT

## 2019-10-08 PROCEDURE — 27000221 HC OXYGEN, UP TO 24 HOURS

## 2019-10-08 PROCEDURE — 94640 AIRWAY INHALATION TREATMENT: CPT

## 2019-10-08 PROCEDURE — 63600175 PHARM REV CODE 636 W HCPCS: Performed by: INTERNAL MEDICINE

## 2019-10-08 PROCEDURE — 99900035 HC TECH TIME PER 15 MIN (STAT)

## 2019-10-08 PROCEDURE — 25000242 PHARM REV CODE 250 ALT 637 W/ HCPCS: Performed by: INTERNAL MEDICINE

## 2019-10-08 RX ORDER — ASPIRIN 81 MG/1
81 TABLET ORAL DAILY
Qty: 30 TABLET | Refills: 2 | Status: SHIPPED | OUTPATIENT
Start: 2019-10-09 | End: 2019-11-08

## 2019-10-08 RX ORDER — CLOPIDOGREL BISULFATE 75 MG/1
75 TABLET ORAL DAILY
Qty: 30 TABLET | Refills: 2 | Status: SHIPPED | OUTPATIENT
Start: 2019-10-09 | End: 2019-11-08

## 2019-10-08 RX ORDER — LISINOPRIL 2.5 MG/1
2.5 TABLET ORAL DAILY
Qty: 30 TABLET | Refills: 2 | Status: ON HOLD | OUTPATIENT
Start: 2019-10-08 | End: 2019-12-18 | Stop reason: HOSPADM

## 2019-10-08 RX ADMIN — SODIUM BICARBONATE 650 MG TABLET 650 MG: at 09:10

## 2019-10-08 RX ADMIN — MEMANTINE HYDROCHLORIDE 5 MG: 5 TABLET ORAL at 09:10

## 2019-10-08 RX ADMIN — ACETAMINOPHEN 650 MG: 325 TABLET ORAL at 10:10

## 2019-10-08 RX ADMIN — LEVALBUTEROL HYDROCHLORIDE 0.63 MG: 0.63 SOLUTION RESPIRATORY (INHALATION) at 01:10

## 2019-10-08 RX ADMIN — CLOPIDOGREL BISULFATE 75 MG: 75 TABLET, FILM COATED ORAL at 09:10

## 2019-10-08 RX ADMIN — TORSEMIDE 20 MG: 20 TABLET ORAL at 09:10

## 2019-10-08 RX ADMIN — ENOXAPARIN SODIUM 40 MG: 100 INJECTION SUBCUTANEOUS at 01:10

## 2019-10-08 RX ADMIN — LEVOTHYROXINE SODIUM 125 MCG: 0.03 TABLET ORAL at 06:10

## 2019-10-08 RX ADMIN — CETIRIZINE HYDROCHLORIDE 10 MG: 10 TABLET, FILM COATED ORAL at 09:10

## 2019-10-08 RX ADMIN — ASPIRIN 81 MG: 81 TABLET, DELAYED RELEASE ORAL at 09:10

## 2019-10-08 RX ADMIN — ESCITALOPRAM OXALATE 20 MG: 10 TABLET ORAL at 09:10

## 2019-10-08 RX ADMIN — LEVALBUTEROL HYDROCHLORIDE 0.63 MG: 0.63 SOLUTION RESPIRATORY (INHALATION) at 07:10

## 2019-10-08 NOTE — CARE UPDATE
Readmission Prevention/Transitions of Care    DX NSTEMI, COPD, CHF. CHARLIE DX. OTHER: DM2, CAD w/multiple stents.  (not all inclusive list).    Pt is established nursing home resident in [facility] transferred from [facility] due to NSTEMI. Pt is a dx with Dementia, hx wandering.  Pt is @ high readmission risk. Pt will not benefit from CHARLIE program.    Sandra DUVALL, LPN    Transitions of Care Program  10/8/2019, 12:28 pm

## 2019-10-08 NOTE — PROGRESS NOTES
Pt wheeled off unit to [facility] vehicle . IV and tele removed. Paperwork given to transportation tech.

## 2019-10-08 NOTE — PLAN OF CARE
10/08/19 1504   Final Note   Assessment Type Discharge Planning Reassessment   Anticipated Discharge Disposition Long Term   Patient from [facility], returning today. DC order faxed, Claudia verified rcpt and is sending van for transport. Number for [facility] given to nurseAnuja to call report.

## 2019-10-08 NOTE — NURSING
Pt getting discharged to [facility]. Called report to SATISH rivas. Pt IV and tele removed. Discussed new medications with pt and nurse on phone. Sending prescriptions and discharge paperwork with facility transport at [facility].

## 2019-10-08 NOTE — DISCHARGE SUMMARY
[facility] Medicine  Discharge Summary    DOS:10/08/2019  Time: 10:30am      Patient Name: Katy Melchor  MRN: [id number]  Admission Date: 10/5/2019  Hospital Length of Stay: 2 days  Discharge Date and Time:  10/08/2019 2:10 PM  Attending Physician: Kerry Alex MD   Discharging Provider: Kerry Alex MD  Primary Care Provider: Julius Nguyen MD      HPI:   79-year-old white female transferred from [facility] for NSTEMI, patient is a resident of [facility] and has a past medical history of dementia with 1 episode of behavioral issue, diabetes type 2 (uncontrolled per daughter), hypertension, hyperlipidemia, coronary artery disease with history of more than 8 stents placed, COPD, depression  Upon reviewing the ER records noted patient was transferred to [facility] with complaint of chest pain, chest heaviness and pressure for 2 days.  When I examined the patient patient denied chest pain and reported she does not recall why she is in the ER.  Daughters also collaborated her story stating that the nursing home did informed them of the chest pain complain  In the ER patient initial cardiac enzymes were negative but the 2nd set of enzyme had a bump in troponin 2.7 with no changes on EKG.  Physician at [facility] consulted with Dr. Maradiaga and patient was accepted and transferred to [facility]    Procedure(s) (LRB):  Left heart cath (Left)  Stent, Drug Eluting, Single Vessel, Coronary (Right)      Hospital Course:   10/6 -  in cath lab with Dr. Maradiaga at the time of my visit    10/7 - s/p DAPHNE x1 to PDA, family member at bedside reports she has been doing well overnight, no issues. Has not complained of any cp or sob.     10/08:  No new complaints.  Patient is asymptomatic, chest pain free.  Cardiology cleared her for discharge. She was discharged on dual antiplatelets, small dose of lisinopril.  No anticoagulation was prescribed upon discharge due to use of dual  antiplatelets and risk of bleed.     Review of systems:  Negative except mentioned above    Physical Exam   Constitutional: She appears well-developed and well-nourished.   HENT:   Head: Normocephalic and atraumatic.   Eyes: Pupils are equal, round, and reactive to light.   Neck: Normal range of motion. Neck supple.   Cardiovascular: Normal rate and regular rhythm.   Pulmonary/Chest: Effort normal. No respiratory distress.   Abdominal: Soft. Bowel sounds are normal. She exhibits no distension. There is no tenderness. There is no guarding.   Neurological: She is alert. No cranial nerve deficit or sensory deficit. She exhibits normal muscle tone.   Skin: Skin is warm and dry. Capillary refill takes less than 2 seconds.       Consults:   Consults (From admission, onward)        Status Ordering Provider     Inpatient consult to Cardiology  Once     Provider:  Maikel Maradiaga MD    Completed DELFINO DOAN     Inpatient consult to Internal Medicine  Once     Provider:  Delfino Doan MD    Acknowledged DELFINO DOAN          No new Assessment & Plan notes have been filed under this hospital service since the last note was generated.  Service: Hospital Medicine    Final Active Diagnoses:    Diagnosis Date Noted POA    PRINCIPAL PROBLEM:  NSTEMI (non-ST elevated myocardial infarction) [I21.4] 10/05/2019 Yes    HTN (hypertension) [I10] 10/05/2019 Yes    Hyperlipidemia [E78.5] 10/05/2019 Yes    COPD (chronic obstructive pulmonary disease) on home oxygen [J44.9] 10/05/2019 Yes    Diabetes mellitus, type 2 [E11.9] 10/05/2019 Yes    Coronary artery disease with approx 8 stents in the past [I25.10] 10/05/2019 Yes    Dementia with 1 episode of behavioral disturbance [F03.91] 10/05/2019 Yes    Chronic a-fib [I48.20] 10/05/2019 Yes    CHF (congestive heart failure) [I50.9] 10/05/2019 Yes      Problems Resolved During this Admission:    Diagnosis Date Noted Date Resolved POA    Chest pain [R07.9] 10/05/2019 10/08/2019 Yes        Discharged Condition: fair    Disposition: Home or Self Care    Follow Up:  Follow-up Information     Maikel Maradiaga MD In 1 week.    Specialty:  Cardiology  Contact information:  [address] [place] [address]  [address]  [facility]  [place] [address]  [phone]                 Patient Instructions:      Diet Cardiac     Diet diabetic     Activity as tolerated       Significant Diagnostic Studies: Radiology: X-Ray: CXR: X-Ray Chest 1 View (CXR): No results found for this visit on 10/05/19.    Pending Diagnostic Studies:     Procedure Component Value Units Date/Time    Echo Color Flow Doppler? Yes [[id number]] Resulted:  10/06/19 0930    Order Status:  Sent Lab Status:  In process Updated:  10/06/19 0930     BSA 2.24 m2      TDI SEPTAL 0.05 m/s      LV LATERAL E/E' RATIO 14.00 m/s      LV SEPTAL E/E' RATIO 16.80 m/s      AORTIC VALVE CUSP SEPERATION 1.74 cm      TDI LATERAL 0.06 m/s      PV PEAK VELOCITY 73.33 cm/s      LVIDD 4.85 cm      IVS 0.98 cm      PW 0.98 cm      Ao root annulus 3.10 cm      LVIDS 3.32 cm      FS 32 %      LV mass 168.45 g      LA size 3.82 cm      RVDD 226.00 cm      Left Ventricle Relative Wall Thickness 0.40 cm      AV mean gradient 4 mmHg      AV valve area 2.46 cm2      AV Velocity Ratio 77.25     AV index (prosthetic) 0.71     E/A ratio 1.04     Mean e' 0.06 m/s      E wave decelartion time 182.33 msec      IVRT 74.59 msec      LVOT diameter 2.10 cm      LVOT area 3.5 cm2      LVOT peak jordan 96.56 m/s      LVOT peak VTI 24.16 cm      Ao peak jordan 1.25 m/s      Ao VTI 33.95 cm      LVOT stroke volume 83.64 cm3      AV peak gradient 6 mmHg      E/E' ratio 15.27 m/s      MV Peak E Jordan 0.84 m/s      TR Max Jordan 3.21 m/s      MV Peak A Jordan 0.81 m/s      LV Systolic Volume 45.48 mL      LV Systolic Volume Index 21.2 mL/m2      LV Diastolic Volume 82.95 mL      LV Diastolic Volume Index 38.71 mL/m2      LV Mass Index 79 g/m2      Triscuspid Valve Regurgitation Peak Gradient 41 mmHg           Medications:  Reconciled Home Medications:      Medication List      START taking these medications    aspirin 81 MG EC tablet  Commonly known as:  ECOTRIN  Take 1 tablet (81 mg total) by mouth once daily.  Start taking on:  October 9, 2019     clopidogrel 75 mg tablet  Commonly known as:  PLAVIX  Take 1 tablet (75 mg total) by mouth once daily.  Start taking on:  October 9, 2019     lisinopril 2.5 MG tablet  Commonly known as:  PRINIVIL,ZESTRIL  Take 1 tablet (2.5 mg total) by mouth once daily.        CONTINUE taking these medications    ALBUTEROL SULFATE INHL  Inhale into the lungs 4 (four) times daily as needed. 2.5mg/3ml soln     atorvastatin 20 MG tablet  Commonly known as:  LIPITOR  Take 20 mg by mouth once daily.     bacitracin-polymyxin b ophthalmic ointment  Commonly known as:  POLYSPORIN  Place into both eyes every evening.     BETAPACE 80 MG tablet  Generic drug:  sotalol  Take 40 mg by mouth 2 (two) times daily.     CALTRATE WITH VITAMIN D3 ORAL  Take 1 tablet by mouth every morning.     COQ-10 100 mg capsule  Generic drug:  coenzyme Q10  Take 200 mg by mouth once daily.     cyanocobalamin 1,000 mcg/mL injection  1,000 mcg every 30 days.     donepezil 10 MG tablet  Commonly known as:  ARICEPT  Take 10 mg by mouth every evening.     escitalopram oxalate 20 MG tablet  Commonly known as:  LEXAPRO  Take 20 mg by mouth once daily.     EYE DROPS OPHT  Apply 1 drop to eye 4 (four) times daily.     fluticasone propionate 50 mcg/actuation Dsdv  Commonly known as:  FLOVENT DISKUS  Inhale 1 spray into the lungs daily as needed. Controller     guaifenesin 100 mg/5 ml 100 mg/5 mL syrup  Commonly known as:  ROBITUSSIN  Take 200 mg by mouth 4 (four) times daily as needed for Cough.     KLOR-CON M20 ORAL  Take 1 tablet by mouth once daily.     levothyroxine 137 MCG Tab tablet  Commonly known as:  SYNTHROID  Take by mouth before breakfast.     loratadine 10 mg tablet  Commonly known as:  CLARITIN  Take 10 mg by mouth  once daily.     memantine 10 MG Tab  Commonly known as:  NAMENDA  Take 5 mg by mouth 2 (two) times daily.     multivitamin Tab  Take 1 tablet by mouth once daily.     PRO-STAT SUGAR FREE ORAL  Take 30 mLs by mouth once daily.     SITagliptin 50 MG Tab  Commonly known as:  JANUVIA  Take 100 mg by mouth once daily.     sodium bicarbonate 650 MG tablet  Take 650 mg by mouth 3 (three) times daily.     torsemide 20 MG Tab  Commonly known as:  DEMADEX  Take 20 mg by mouth once daily.     TRELEGY ELLIPTA 100-62.5-25 mcg Dsdv  Generic drug:  fluticasone-umeclidin-vilanter  Inhale 1 puff into the lungs once daily.     XOPENEX HFA 45 mcg/actuation inhaler  Generic drug:  levalbuterol  Inhale 2 puffs into the lungs 2 (two) times daily. Rescue            Indwelling Lines/Drains at time of discharge:   Lines/Drains/Airways     None                 Time spent on the discharge of patient: 25 minutes  Patient was seen and examined on the date of discharge and determined to be suitable for discharge.         Kerry Alex MD  Department of Hospital Medicine  Former[facility]

## 2019-10-08 NOTE — CARE UPDATE
10/07/19 2013   Patient Assessment/Suction   Level of Consciousness (AVPU) alert   Respiratory Effort Unlabored   Expansion/Accessory Muscles/Retractions no use of accessory muscles   All Lung Fields Breath Sounds diminished   Rhythm/Pattern, Respiratory unlabored   Cough Frequency no cough   PRE-TX-O2   O2 Device (Oxygen Therapy) nasal cannula   $ Is the patient on Low Flow Oxygen? Yes   Flow (L/min) 3   SpO2 96 %   Pulse 100   Resp (!) 22   Positioning   Body Position sitting up in bed   Aerosol Therapy   $ Aerosol Therapy Charges Aerosol Treatment   Daily Review of Necessity (SVN) completed   Respiratory Treatment Status (SVN) given   Treatment Route (SVN) mask;oxygen   Patient Position (SVN) sitting on edge of bed   Post Treatment Assessment (SVN) breath sounds improved   Signs of Intolerance (SVN) none   Breath Sounds Post-Respiratory Treatment   Throughout All Fields Post-Treatment All Fields   Throughout All Fields Post-Treatment aeration increased   Post-treatment Heart Rate (beats/min) 87   Post-treatment Resp Rate (breaths/min) 22

## 2019-10-09 NOTE — PLAN OF CARE
10/09/19 1057   Final Note   Assessment Type Final Discharge Note   Anticipated Discharge Disposition half-way Nu   Patient dec'd on 10/08/2019 back to [facility] via facility transport. Patient is a detention resident.

## 2019-10-15 ENCOUNTER — LAB VISIT (OUTPATIENT)
Dept: LAB | Facility: HOSPITAL | Age: 79
End: 2019-10-15
Attending: NURSE PRACTITIONER
Payer: MEDICARE

## 2019-10-15 DIAGNOSIS — M48.02 SPINAL STENOSIS IN CERVICAL REGION: ICD-10-CM

## 2019-10-15 DIAGNOSIS — D63.8 CHRONIC DISEASE ANEMIA: ICD-10-CM

## 2019-10-15 DIAGNOSIS — I10 ESSENTIAL HYPERTENSION, MALIGNANT: ICD-10-CM

## 2019-10-15 DIAGNOSIS — I48.0 PAROXYSMAL ATRIAL FIBRILLATION: Primary | ICD-10-CM

## 2019-10-15 LAB
ANION GAP SERPL CALC-SCNC: 9 MMOL/L (ref 8–16)
AORTIC ROOT ANNULUS: 3.1 CM
AORTIC VALVE CUSP SEPERATION: 1.74 CM
AV INDEX (PROSTH): 0.71
AV MEAN GRADIENT: 4 MMHG
AV PEAK GRADIENT: 6 MMHG
AV VALVE AREA: 2.46 CM2
AV VELOCITY RATIO: 77.25
BASOPHILS # BLD AUTO: 0.06 K/UL (ref 0–0.2)
BASOPHILS NFR BLD: 0.6 % (ref 0–1.9)
BNP SERPL-MCNC: 236 PG/ML (ref 0–99)
BSA FOR ECHO PROCEDURE: 2.24 M2
BUN SERPL-MCNC: 16 MG/DL (ref 8–23)
CALCIUM SERPL-MCNC: 8.8 MG/DL (ref 8.7–10.5)
CHLORIDE SERPL-SCNC: 105 MMOL/L (ref 95–110)
CO2 SERPL-SCNC: 25 MMOL/L (ref 23–29)
CREAT SERPL-MCNC: 1.2 MG/DL (ref 0.5–1.4)
CV ECHO LV RWT: 0.4 CM
DIFFERENTIAL METHOD: ABNORMAL
DOP CALC AO PEAK VEL: 1.25 M/S
DOP CALC AO VTI: 33.95 CM
DOP CALC LVOT AREA: 3.5 CM2
DOP CALC LVOT DIAMETER: 2.1 CM
DOP CALC LVOT PEAK VEL: 96.56 M/S
DOP CALC LVOT STROKE VOLUME: 83.64 CM3
DOP CALCLVOT PEAK VEL VTI: 24.16 CM
E WAVE DECELERATION TIME: 182.33 MSEC
E/A RATIO: 1.04
E/E' RATIO: 15.27 M/S
ECHO LV POSTERIOR WALL: 0.98 CM (ref 0.6–1.1)
EOSINOPHIL # BLD AUTO: 0.3 K/UL (ref 0–0.5)
EOSINOPHIL NFR BLD: 2.4 % (ref 0–8)
ERYTHROCYTE [DISTWIDTH] IN BLOOD BY AUTOMATED COUNT: 14.7 % (ref 11.5–14.5)
EST. GFR  (AFRICAN AMERICAN): 50 ML/MIN/1.73 M^2
EST. GFR  (NON AFRICAN AMERICAN): 43.4 ML/MIN/1.73 M^2
FRACTIONAL SHORTENING: 32 % (ref 28–44)
GLUCOSE SERPL-MCNC: 105 MG/DL (ref 70–110)
HCT VFR BLD AUTO: 37.5 % (ref 37–48.5)
HGB BLD-MCNC: 11.6 G/DL (ref 12–16)
IMM GRANULOCYTES # BLD AUTO: 0.05 K/UL (ref 0–0.04)
IMM GRANULOCYTES NFR BLD AUTO: 0.5 % (ref 0–0.5)
INTERVENTRICULAR SEPTUM: 0.98 CM (ref 0.6–1.1)
IVRT: 74.59 MSEC
LEFT ATRIUM SIZE: 3.82 CM
LEFT INTERNAL DIMENSION IN SYSTOLE: 3.32 CM (ref 2.1–4)
LEFT VENTRICLE DIASTOLIC VOLUME INDEX: 38.71 ML/M2
LEFT VENTRICLE DIASTOLIC VOLUME: 82.95 ML
LEFT VENTRICLE MASS INDEX: 79 G/M2
LEFT VENTRICLE SYSTOLIC VOLUME INDEX: 21.2 ML/M2
LEFT VENTRICLE SYSTOLIC VOLUME: 45.48 ML
LEFT VENTRICULAR INTERNAL DIMENSION IN DIASTOLE: 4.85 CM (ref 3.5–6)
LEFT VENTRICULAR MASS: 168.45 G
LV LATERAL E/E' RATIO: 14 M/S
LV SEPTAL E/E' RATIO: 16.8 M/S
LYMPHOCYTES # BLD AUTO: 3.4 K/UL (ref 1–4.8)
LYMPHOCYTES NFR BLD: 33.1 % (ref 18–48)
MCH RBC QN AUTO: 28.2 PG (ref 27–31)
MCHC RBC AUTO-ENTMCNC: 30.9 G/DL (ref 32–36)
MCV RBC AUTO: 91 FL (ref 82–98)
MONOCYTES # BLD AUTO: 0.8 K/UL (ref 0.3–1)
MONOCYTES NFR BLD: 7.5 % (ref 4–15)
MV PEAK A VEL: 0.81 M/S
MV PEAK E VEL: 0.84 M/S
NEUTROPHILS # BLD AUTO: 5.7 K/UL (ref 1.8–7.7)
NEUTROPHILS NFR BLD: 55.9 % (ref 38–73)
NRBC BLD-RTO: 0 /100 WBC
PISA TR MAX VEL: 3.21 M/S
PLATELET # BLD AUTO: 244 K/UL (ref 150–350)
PMV BLD AUTO: 10.8 FL (ref 9.2–12.9)
POTASSIUM SERPL-SCNC: 3.9 MMOL/L (ref 3.5–5.1)
PV PEAK VELOCITY: 73.33 CM/S
RBC # BLD AUTO: 4.11 M/UL (ref 4–5.4)
RIGHT VENTRICULAR END-DIASTOLIC DIMENSION: 226 CM
SODIUM SERPL-SCNC: 139 MMOL/L (ref 136–145)
T4 FREE SERPL-MCNC: 1.63 NG/DL (ref 0.71–1.51)
TDI LATERAL: 0.06 M/S
TDI SEPTAL: 0.05 M/S
TDI: 0.06 M/S
TR MAX PG: 41 MMHG
TSH SERPL DL<=0.005 MIU/L-ACNC: 0.25 UIU/ML (ref 0.34–5.6)
WBC # BLD AUTO: 10.26 K/UL (ref 3.9–12.7)

## 2019-10-15 PROCEDURE — 83880 ASSAY OF NATRIURETIC PEPTIDE: CPT

## 2019-10-15 PROCEDURE — 84443 ASSAY THYROID STIM HORMONE: CPT

## 2019-10-15 PROCEDURE — 84439 ASSAY OF FREE THYROXINE: CPT

## 2019-10-15 PROCEDURE — 80048 BASIC METABOLIC PNL TOTAL CA: CPT

## 2019-10-15 PROCEDURE — 36415 COLL VENOUS BLD VENIPUNCTURE: CPT

## 2019-10-15 PROCEDURE — 85025 COMPLETE CBC W/AUTO DIFF WBC: CPT

## 2019-10-29 ENCOUNTER — LAB VISIT (OUTPATIENT)
Dept: LAB | Facility: HOSPITAL | Age: 79
End: 2019-10-29
Attending: NURSE PRACTITIONER
Payer: MEDICARE

## 2019-10-29 DIAGNOSIS — I50.22 CHRONIC SYSTOLIC HEART FAILURE: ICD-10-CM

## 2019-10-29 DIAGNOSIS — I48.0 PAROXYSMAL ATRIAL FIBRILLATION: Primary | ICD-10-CM

## 2019-10-29 DIAGNOSIS — M48.02 SPINAL STENOSIS IN CERVICAL REGION: ICD-10-CM

## 2019-10-29 DIAGNOSIS — I10 ESSENTIAL HYPERTENSION, MALIGNANT: ICD-10-CM

## 2019-10-29 LAB
ANION GAP SERPL CALC-SCNC: 10 MMOL/L (ref 8–16)
BNP SERPL-MCNC: 171 PG/ML (ref 0–99)
BUN SERPL-MCNC: 16 MG/DL (ref 8–23)
CALCIUM SERPL-MCNC: 8.7 MG/DL (ref 8.7–10.5)
CHLORIDE SERPL-SCNC: 105 MMOL/L (ref 95–110)
CO2 SERPL-SCNC: 25 MMOL/L (ref 23–29)
CREAT SERPL-MCNC: 1.1 MG/DL (ref 0.5–1.4)
EST. GFR  (AFRICAN AMERICAN): 55.6 ML/MIN/1.73 M^2
EST. GFR  (NON AFRICAN AMERICAN): 48.2 ML/MIN/1.73 M^2
GLUCOSE SERPL-MCNC: 108 MG/DL (ref 70–110)
POTASSIUM SERPL-SCNC: 3.8 MMOL/L (ref 3.5–5.1)
SODIUM SERPL-SCNC: 140 MMOL/L (ref 136–145)

## 2019-10-29 PROCEDURE — 83880 ASSAY OF NATRIURETIC PEPTIDE: CPT

## 2019-10-29 PROCEDURE — 36415 COLL VENOUS BLD VENIPUNCTURE: CPT

## 2019-10-29 PROCEDURE — 80048 BASIC METABOLIC PNL TOTAL CA: CPT

## 2019-11-26 ENCOUNTER — LAB VISIT (OUTPATIENT)
Dept: LAB | Facility: HOSPITAL | Age: 79
End: 2019-11-26
Attending: INTERNAL MEDICINE
Payer: MEDICARE

## 2019-11-26 DIAGNOSIS — I10 ESSENTIAL HYPERTENSION, MALIGNANT: ICD-10-CM

## 2019-11-26 DIAGNOSIS — E11.40 DIABETIC NEUROPATHY: ICD-10-CM

## 2019-11-26 DIAGNOSIS — I50.31 ACUTE DIASTOLIC HEART FAILURE: Primary | ICD-10-CM

## 2019-11-26 LAB
ANION GAP SERPL CALC-SCNC: 10 MMOL/L (ref 8–16)
BNP SERPL-MCNC: 267 PG/ML (ref 0–99)
BUN SERPL-MCNC: 15 MG/DL (ref 8–23)
CALCIUM SERPL-MCNC: 8.8 MG/DL (ref 8.7–10.5)
CHLORIDE SERPL-SCNC: 106 MMOL/L (ref 95–110)
CO2 SERPL-SCNC: 22 MMOL/L (ref 23–29)
CREAT SERPL-MCNC: 1.2 MG/DL (ref 0.5–1.4)
EST. GFR  (AFRICAN AMERICAN): 50 ML/MIN/1.73 M^2
EST. GFR  (NON AFRICAN AMERICAN): 43.4 ML/MIN/1.73 M^2
ESTIMATED AVG GLUCOSE: 126 MG/DL (ref 68–131)
GLUCOSE SERPL-MCNC: 88 MG/DL (ref 70–110)
HBA1C MFR BLD HPLC: 6 % (ref 4.5–6.2)
POTASSIUM SERPL-SCNC: 3.6 MMOL/L (ref 3.5–5.1)
SODIUM SERPL-SCNC: 138 MMOL/L (ref 136–145)

## 2019-11-26 PROCEDURE — 83036 HEMOGLOBIN GLYCOSYLATED A1C: CPT

## 2019-11-26 PROCEDURE — 83880 ASSAY OF NATRIURETIC PEPTIDE: CPT

## 2019-11-26 PROCEDURE — 36415 COLL VENOUS BLD VENIPUNCTURE: CPT

## 2019-11-26 PROCEDURE — 80048 BASIC METABOLIC PNL TOTAL CA: CPT

## 2019-12-05 ENCOUNTER — LAB VISIT (OUTPATIENT)
Dept: LAB | Facility: HOSPITAL | Age: 79
End: 2019-12-05
Attending: INTERNAL MEDICINE
Payer: MEDICARE

## 2019-12-05 DIAGNOSIS — I50.9 HEART FAILURE, UNSPECIFIED: ICD-10-CM

## 2019-12-05 DIAGNOSIS — I10 ESSENTIAL HYPERTENSION, MALIGNANT: ICD-10-CM

## 2019-12-05 DIAGNOSIS — I25.10 CORONARY ATHEROSCLEROSIS OF NATIVE CORONARY ARTERY: Primary | ICD-10-CM

## 2019-12-05 DIAGNOSIS — D64.9 ANEMIA, UNSPECIFIED: ICD-10-CM

## 2019-12-05 LAB
ALBUMIN SERPL BCP-MCNC: 3.4 G/DL (ref 3.5–5.2)
ALP SERPL-CCNC: 96 U/L (ref 55–135)
ALT SERPL W/O P-5'-P-CCNC: 17 U/L (ref 10–44)
ANION GAP SERPL CALC-SCNC: 15 MMOL/L (ref 8–16)
AST SERPL-CCNC: 16 U/L (ref 10–40)
BASOPHILS # BLD AUTO: 0.05 K/UL (ref 0–0.2)
BASOPHILS NFR BLD: 0.5 % (ref 0–1.9)
BILIRUB SERPL-MCNC: 0.7 MG/DL (ref 0.1–1)
BUN SERPL-MCNC: 41 MG/DL (ref 8–23)
CALCIUM SERPL-MCNC: 9.3 MG/DL (ref 8.7–10.5)
CHLORIDE SERPL-SCNC: 100 MMOL/L (ref 95–110)
CO2 SERPL-SCNC: 24 MMOL/L (ref 23–29)
CREAT SERPL-MCNC: 1.7 MG/DL (ref 0.5–1.4)
DIFFERENTIAL METHOD: NORMAL
EOSINOPHIL # BLD AUTO: 0.2 K/UL (ref 0–0.5)
EOSINOPHIL NFR BLD: 1.8 % (ref 0–8)
ERYTHROCYTE [DISTWIDTH] IN BLOOD BY AUTOMATED COUNT: 14.3 % (ref 11.5–14.5)
EST. GFR  (AFRICAN AMERICAN): 32.8 ML/MIN/1.73 M^2
EST. GFR  (NON AFRICAN AMERICAN): 28.5 ML/MIN/1.73 M^2
FERRITIN SERPL-MCNC: 223 NG/ML (ref 20–300)
GLUCOSE SERPL-MCNC: 114 MG/DL (ref 70–110)
HCT VFR BLD AUTO: 41 % (ref 37–48.5)
HGB BLD-MCNC: 13.1 G/DL (ref 12–16)
IMM GRANULOCYTES # BLD AUTO: 0.04 K/UL (ref 0–0.04)
IMM GRANULOCYTES NFR BLD AUTO: 0.4 % (ref 0–0.5)
IRON SERPL-MCNC: 46 UG/DL (ref 30–160)
LYMPHOCYTES # BLD AUTO: 3.7 K/UL (ref 1–4.8)
LYMPHOCYTES NFR BLD: 35.5 % (ref 18–48)
MAGNESIUM SERPL-MCNC: 2.1 MG/DL (ref 1.6–2.6)
MCH RBC QN AUTO: 28.7 PG (ref 27–31)
MCHC RBC AUTO-ENTMCNC: 32 G/DL (ref 32–36)
MCV RBC AUTO: 90 FL (ref 82–98)
MONOCYTES # BLD AUTO: 0.8 K/UL (ref 0.3–1)
MONOCYTES NFR BLD: 8 % (ref 4–15)
NEUTROPHILS # BLD AUTO: 5.7 K/UL (ref 1.8–7.7)
NEUTROPHILS NFR BLD: 53.8 % (ref 38–73)
NRBC BLD-RTO: 0 /100 WBC
PLATELET # BLD AUTO: 250 K/UL (ref 150–350)
PMV BLD AUTO: 11.2 FL (ref 9.2–12.9)
POTASSIUM SERPL-SCNC: 3.1 MMOL/L (ref 3.5–5.1)
PROT SERPL-MCNC: 7.4 G/DL (ref 6–8.4)
RBC # BLD AUTO: 4.56 M/UL (ref 4–5.4)
SODIUM SERPL-SCNC: 139 MMOL/L (ref 136–145)
WBC # BLD AUTO: 10.54 K/UL (ref 3.9–12.7)

## 2019-12-05 PROCEDURE — 83540 ASSAY OF IRON: CPT

## 2019-12-05 PROCEDURE — 83735 ASSAY OF MAGNESIUM: CPT

## 2019-12-05 PROCEDURE — 36415 COLL VENOUS BLD VENIPUNCTURE: CPT

## 2019-12-05 PROCEDURE — 82728 ASSAY OF FERRITIN: CPT

## 2019-12-05 PROCEDURE — 85025 COMPLETE CBC W/AUTO DIFF WBC: CPT

## 2019-12-05 PROCEDURE — 80053 COMPREHEN METABOLIC PANEL: CPT

## 2019-12-10 ENCOUNTER — LAB VISIT (OUTPATIENT)
Dept: LAB | Facility: HOSPITAL | Age: 79
End: 2019-12-10
Attending: INTERNAL MEDICINE
Payer: MEDICARE

## 2019-12-10 DIAGNOSIS — E87.6 HYPOPOTASSEMIA: Primary | ICD-10-CM

## 2019-12-10 LAB
ANION GAP SERPL CALC-SCNC: 11 MMOL/L (ref 8–16)
BUN SERPL-MCNC: 28 MG/DL (ref 8–23)
CALCIUM SERPL-MCNC: 8.7 MG/DL (ref 8.7–10.5)
CHLORIDE SERPL-SCNC: 102 MMOL/L (ref 95–110)
CO2 SERPL-SCNC: 23 MMOL/L (ref 23–29)
CREAT SERPL-MCNC: 1.7 MG/DL (ref 0.5–1.4)
EST. GFR  (AFRICAN AMERICAN): 32.8 ML/MIN/1.73 M^2
EST. GFR  (NON AFRICAN AMERICAN): 28.5 ML/MIN/1.73 M^2
GLUCOSE SERPL-MCNC: 93 MG/DL (ref 70–110)
POTASSIUM SERPL-SCNC: 3.9 MMOL/L (ref 3.5–5.1)
SODIUM SERPL-SCNC: 136 MMOL/L (ref 136–145)

## 2019-12-10 PROCEDURE — 36415 COLL VENOUS BLD VENIPUNCTURE: CPT

## 2019-12-10 PROCEDURE — 80048 BASIC METABOLIC PNL TOTAL CA: CPT

## 2019-12-14 ENCOUNTER — HOSPITAL ENCOUNTER (INPATIENT)
Facility: HOSPITAL | Age: 79
LOS: 1 days | Discharge: NURSING, REHAB OR LONG-TERM CARE | DRG: 683 | End: 2019-12-18
Attending: EMERGENCY MEDICINE | Admitting: HOSPITALIST
Payer: MEDICARE

## 2019-12-14 DIAGNOSIS — R53.1 WEAKNESS: ICD-10-CM

## 2019-12-14 DIAGNOSIS — I48.20 CHRONIC A-FIB: ICD-10-CM

## 2019-12-14 DIAGNOSIS — N18.9 ACUTE-ON-CHRONIC KIDNEY INJURY: ICD-10-CM

## 2019-12-14 DIAGNOSIS — N39.0 URINARY TRACT INFECTION WITHOUT HEMATURIA, SITE UNSPECIFIED: ICD-10-CM

## 2019-12-14 DIAGNOSIS — N17.9 ACUTE-ON-CHRONIC KIDNEY INJURY: ICD-10-CM

## 2019-12-14 DIAGNOSIS — N17.9 AKI (ACUTE KIDNEY INJURY): ICD-10-CM

## 2019-12-14 DIAGNOSIS — R53.1 GENERALIZED WEAKNESS: ICD-10-CM

## 2019-12-14 DIAGNOSIS — E87.1 HYPONATREMIA: Primary | ICD-10-CM

## 2019-12-14 PROBLEM — F03.90 DEMENTIA: Status: ACTIVE | Noted: 2019-12-14

## 2019-12-14 LAB
ALBUMIN SERPL BCP-MCNC: 3.1 G/DL (ref 3.5–5.2)
ALP SERPL-CCNC: 329 U/L (ref 55–135)
ALT SERPL W/O P-5'-P-CCNC: 100 U/L (ref 10–44)
ANION GAP SERPL CALC-SCNC: 12 MMOL/L (ref 8–16)
AST SERPL-CCNC: 119 U/L (ref 10–40)
BACTERIA #/AREA URNS HPF: ABNORMAL /HPF
BASOPHILS # BLD AUTO: 0.03 K/UL (ref 0–0.2)
BASOPHILS NFR BLD: 0.2 % (ref 0–1.9)
BILIRUB SERPL-MCNC: 1.3 MG/DL (ref 0.1–1)
BILIRUB UR QL STRIP: NEGATIVE
BNP SERPL-MCNC: 95 PG/ML (ref 0–99)
BUN SERPL-MCNC: 42 MG/DL (ref 8–23)
CALCIUM SERPL-MCNC: 8.9 MG/DL (ref 8.7–10.5)
CHLORIDE SERPL-SCNC: 90 MMOL/L (ref 95–110)
CLARITY UR: ABNORMAL
CO2 SERPL-SCNC: 28 MMOL/L (ref 23–29)
COLOR UR: YELLOW
CREAT SERPL-MCNC: 3.5 MG/DL (ref 0.5–1.4)
DIFFERENTIAL METHOD: ABNORMAL
EOSINOPHIL # BLD AUTO: 0.1 K/UL (ref 0–0.5)
EOSINOPHIL NFR BLD: 0.6 % (ref 0–8)
ERYTHROCYTE [DISTWIDTH] IN BLOOD BY AUTOMATED COUNT: 14.6 % (ref 11.5–14.5)
EST. GFR  (AFRICAN AMERICAN): 13.7 ML/MIN/1.73 M^2
EST. GFR  (NON AFRICAN AMERICAN): 11.9 ML/MIN/1.73 M^2
GLUCOSE SERPL-MCNC: 117 MG/DL (ref 70–110)
GLUCOSE UR QL STRIP: NEGATIVE
HCT VFR BLD AUTO: 35.4 % (ref 37–48.5)
HGB BLD-MCNC: 11.6 G/DL (ref 12–16)
HGB UR QL STRIP: ABNORMAL
HYALINE CASTS #/AREA URNS LPF: 115 /LPF
IMM GRANULOCYTES # BLD AUTO: 0.15 K/UL (ref 0–0.04)
IMM GRANULOCYTES NFR BLD AUTO: 0.9 % (ref 0–0.5)
INFLUENZA A, MOLECULAR: NEGATIVE
INFLUENZA B, MOLECULAR: NEGATIVE
KETONES UR QL STRIP: NEGATIVE
LDH SERPL L TO P-CCNC: 1.68 MMOL/L (ref 0.5–2.2)
LEUKOCYTE ESTERASE UR QL STRIP: ABNORMAL
LYMPHOCYTES # BLD AUTO: 1.7 K/UL (ref 1–4.8)
LYMPHOCYTES NFR BLD: 10.1 % (ref 18–48)
MCH RBC QN AUTO: 29.1 PG (ref 27–31)
MCHC RBC AUTO-ENTMCNC: 32.8 G/DL (ref 32–36)
MCV RBC AUTO: 89 FL (ref 82–98)
MICROSCOPIC COMMENT: ABNORMAL
MONOCYTES # BLD AUTO: 0.9 K/UL (ref 0.3–1)
MONOCYTES NFR BLD: 5.1 % (ref 4–15)
NEUTROPHILS # BLD AUTO: 14.2 K/UL (ref 1.8–7.7)
NEUTROPHILS NFR BLD: 83.1 % (ref 38–73)
NITRITE UR QL STRIP: NEGATIVE
NRBC BLD-RTO: 0 /100 WBC
PH UR STRIP: 6 [PH] (ref 5–8)
PLATELET # BLD AUTO: 242 K/UL (ref 150–350)
PMV BLD AUTO: 10.5 FL (ref 9.2–12.9)
POTASSIUM SERPL-SCNC: 3.6 MMOL/L (ref 3.5–5.1)
PROT SERPL-MCNC: 7.4 G/DL (ref 6–8.4)
PROT UR QL STRIP: ABNORMAL
RBC # BLD AUTO: 3.99 M/UL (ref 4–5.4)
RBC #/AREA URNS HPF: 2 /HPF (ref 0–4)
SAMPLE: NORMAL
SODIUM SERPL-SCNC: 130 MMOL/L (ref 136–145)
SP GR UR STRIP: 1.01 (ref 1–1.03)
SPECIMEN SOURCE: NORMAL
SQUAMOUS #/AREA URNS HPF: 10 /HPF
T4 FREE SERPL-MCNC: 1.43 NG/DL (ref 0.71–1.51)
TROPONIN I SERPL DL<=0.01 NG/ML-MCNC: 0.04 NG/ML (ref 0.02–0.04)
TSH SERPL DL<=0.005 MIU/L-ACNC: 0.46 UIU/ML (ref 0.34–5.6)
URN SPEC COLLECT METH UR: ABNORMAL
UROBILINOGEN UR STRIP-ACNC: NEGATIVE EU/DL
WBC # BLD AUTO: 17.06 K/UL (ref 3.9–12.7)
WBC #/AREA URNS HPF: 3 /HPF (ref 0–5)

## 2019-12-14 PROCEDURE — 81001 URINALYSIS AUTO W/SCOPE: CPT

## 2019-12-14 PROCEDURE — 84439 ASSAY OF FREE THYROXINE: CPT

## 2019-12-14 PROCEDURE — 83880 ASSAY OF NATRIURETIC PEPTIDE: CPT

## 2019-12-14 PROCEDURE — 63600175 PHARM REV CODE 636 W HCPCS: Performed by: HOSPITALIST

## 2019-12-14 PROCEDURE — 36415 COLL VENOUS BLD VENIPUNCTURE: CPT

## 2019-12-14 PROCEDURE — 25000003 PHARM REV CODE 250: Performed by: EMERGENCY MEDICINE

## 2019-12-14 PROCEDURE — G0378 HOSPITAL OBSERVATION PER HR: HCPCS

## 2019-12-14 PROCEDURE — 87502 INFLUENZA DNA AMP PROBE: CPT

## 2019-12-14 PROCEDURE — 99900035 HC TECH TIME PER 15 MIN (STAT)

## 2019-12-14 PROCEDURE — 96360 HYDRATION IV INFUSION INIT: CPT

## 2019-12-14 PROCEDURE — 84484 ASSAY OF TROPONIN QUANT: CPT

## 2019-12-14 PROCEDURE — 63600175 PHARM REV CODE 636 W HCPCS: Performed by: EMERGENCY MEDICINE

## 2019-12-14 PROCEDURE — 27000221 HC OXYGEN, UP TO 24 HOURS

## 2019-12-14 PROCEDURE — 83605 ASSAY OF LACTIC ACID: CPT

## 2019-12-14 PROCEDURE — 80053 COMPREHEN METABOLIC PANEL: CPT

## 2019-12-14 PROCEDURE — 84443 ASSAY THYROID STIM HORMONE: CPT

## 2019-12-14 PROCEDURE — 85025 COMPLETE CBC W/AUTO DIFF WBC: CPT

## 2019-12-14 PROCEDURE — 93005 ELECTROCARDIOGRAM TRACING: CPT

## 2019-12-14 PROCEDURE — 99285 EMERGENCY DEPT VISIT HI MDM: CPT | Mod: 25

## 2019-12-14 PROCEDURE — 25000003 PHARM REV CODE 250: Performed by: HOSPITALIST

## 2019-12-14 RX ORDER — ACETAMINOPHEN 325 MG/1
650 TABLET ORAL EVERY 6 HOURS PRN
Status: DISCONTINUED | OUTPATIENT
Start: 2019-12-14 | End: 2019-12-15

## 2019-12-14 RX ORDER — POTASSIUM CHLORIDE 7.45 MG/ML
20 INJECTION INTRAVENOUS
Status: DISCONTINUED | OUTPATIENT
Start: 2019-12-14 | End: 2019-12-18 | Stop reason: HOSPADM

## 2019-12-14 RX ORDER — POTASSIUM CHLORIDE 20 MEQ/1
20 TABLET, EXTENDED RELEASE ORAL
Status: DISCONTINUED | OUTPATIENT
Start: 2019-12-14 | End: 2019-12-18 | Stop reason: HOSPADM

## 2019-12-14 RX ORDER — MAGNESIUM SULFATE HEPTAHYDRATE 40 MG/ML
4 INJECTION, SOLUTION INTRAVENOUS
Status: DISCONTINUED | OUTPATIENT
Start: 2019-12-14 | End: 2019-12-18 | Stop reason: HOSPADM

## 2019-12-14 RX ORDER — ENOXAPARIN SODIUM 100 MG/ML
30 INJECTION SUBCUTANEOUS EVERY 24 HOURS
Status: DISCONTINUED | OUTPATIENT
Start: 2019-12-14 | End: 2019-12-16

## 2019-12-14 RX ORDER — SODIUM CHLORIDE 0.9 % (FLUSH) 0.9 %
10 SYRINGE (ML) INJECTION
Status: DISCONTINUED | OUTPATIENT
Start: 2019-12-14 | End: 2019-12-18 | Stop reason: HOSPADM

## 2019-12-14 RX ORDER — CALCIUM CHLORIDE IN 0.9 % NACL 1 G/100 ML
1 INTRAVENOUS SOLUTION, PIGGYBACK (ML) INTRAVENOUS
Status: DISCONTINUED | OUTPATIENT
Start: 2019-12-14 | End: 2019-12-14

## 2019-12-14 RX ORDER — MAGNESIUM SULFATE 1 G/100ML
1 INJECTION INTRAVENOUS
Status: DISCONTINUED | OUTPATIENT
Start: 2019-12-14 | End: 2019-12-18 | Stop reason: HOSPADM

## 2019-12-14 RX ORDER — DONEPEZIL HYDROCHLORIDE 5 MG/1
10 TABLET, FILM COATED ORAL NIGHTLY
Status: DISCONTINUED | OUTPATIENT
Start: 2019-12-14 | End: 2019-12-18 | Stop reason: HOSPADM

## 2019-12-14 RX ORDER — MEMANTINE HYDROCHLORIDE 5 MG/1
5 TABLET ORAL 2 TIMES DAILY
Status: DISCONTINUED | OUTPATIENT
Start: 2019-12-14 | End: 2019-12-18 | Stop reason: HOSPADM

## 2019-12-14 RX ORDER — ACETAMINOPHEN 500 MG
500 TABLET ORAL
Status: COMPLETED | OUTPATIENT
Start: 2019-12-14 | End: 2019-12-14

## 2019-12-14 RX ORDER — SOTALOL HYDROCHLORIDE 80 MG/1
40 TABLET ORAL DAILY
Status: DISCONTINUED | OUTPATIENT
Start: 2019-12-15 | End: 2019-12-18 | Stop reason: HOSPADM

## 2019-12-14 RX ORDER — CLOPIDOGREL BISULFATE 75 MG/1
75 TABLET ORAL DAILY
Status: DISCONTINUED | OUTPATIENT
Start: 2019-12-15 | End: 2019-12-18 | Stop reason: HOSPADM

## 2019-12-14 RX ORDER — POTASSIUM CHLORIDE 7.45 MG/ML
40 INJECTION INTRAVENOUS
Status: DISCONTINUED | OUTPATIENT
Start: 2019-12-14 | End: 2019-12-18 | Stop reason: HOSPADM

## 2019-12-14 RX ORDER — POTASSIUM CHLORIDE 20 MEQ/1
40 TABLET, EXTENDED RELEASE ORAL
Status: DISCONTINUED | OUTPATIENT
Start: 2019-12-14 | End: 2019-12-18 | Stop reason: HOSPADM

## 2019-12-14 RX ORDER — SODIUM CHLORIDE 9 MG/ML
INJECTION, SOLUTION INTRAVENOUS CONTINUOUS
Status: DISCONTINUED | OUTPATIENT
Start: 2019-12-14 | End: 2019-12-16

## 2019-12-14 RX ORDER — TALC
8 POWDER (GRAM) TOPICAL NIGHTLY PRN
Status: DISCONTINUED | OUTPATIENT
Start: 2019-12-14 | End: 2019-12-14

## 2019-12-14 RX ORDER — MAGNESIUM SULFATE HEPTAHYDRATE 40 MG/ML
2 INJECTION, SOLUTION INTRAVENOUS
Status: DISCONTINUED | OUTPATIENT
Start: 2019-12-14 | End: 2019-12-18 | Stop reason: HOSPADM

## 2019-12-14 RX ORDER — SODIUM CHLORIDE 9 MG/ML
1000 INJECTION, SOLUTION INTRAVENOUS
Status: COMPLETED | OUTPATIENT
Start: 2019-12-14 | End: 2019-12-14

## 2019-12-14 RX ORDER — ASPIRIN 81 MG/1
81 TABLET ORAL DAILY
Status: DISCONTINUED | OUTPATIENT
Start: 2019-12-15 | End: 2019-12-18 | Stop reason: HOSPADM

## 2019-12-14 RX ORDER — FLUTICASONE PROPIONATE 50 MCG
1 SPRAY, SUSPENSION (ML) NASAL DAILY PRN
COMMUNITY

## 2019-12-14 RX ORDER — LANOLIN ALCOHOL/MO/W.PET/CERES
800 CREAM (GRAM) TOPICAL
Status: DISCONTINUED | OUTPATIENT
Start: 2019-12-14 | End: 2019-12-18 | Stop reason: HOSPADM

## 2019-12-14 RX ORDER — ONDANSETRON 2 MG/ML
4 INJECTION INTRAMUSCULAR; INTRAVENOUS EVERY 6 HOURS PRN
Status: DISCONTINUED | OUTPATIENT
Start: 2019-12-14 | End: 2019-12-18 | Stop reason: HOSPADM

## 2019-12-14 RX ORDER — ESCITALOPRAM OXALATE 10 MG/1
20 TABLET ORAL NIGHTLY
Status: DISCONTINUED | OUTPATIENT
Start: 2019-12-14 | End: 2019-12-14

## 2019-12-14 RX ADMIN — MEMANTINE HYDROCHLORIDE 5 MG: 5 TABLET ORAL at 11:12

## 2019-12-14 RX ADMIN — ACETAMINOPHEN 500 MG: 500 TABLET, FILM COATED ORAL at 08:12

## 2019-12-14 RX ADMIN — SODIUM CHLORIDE 1000 ML: 900 INJECTION INTRAVENOUS at 08:12

## 2019-12-14 RX ADMIN — CEFTRIAXONE SODIUM 1 G: 1 INJECTION, POWDER, FOR SOLUTION INTRAMUSCULAR; INTRAVENOUS at 09:12

## 2019-12-14 RX ADMIN — ENOXAPARIN SODIUM 30 MG: 100 INJECTION SUBCUTANEOUS at 11:12

## 2019-12-14 RX ADMIN — SODIUM CHLORIDE: 0.9 INJECTION, SOLUTION INTRAVENOUS at 10:12

## 2019-12-14 RX ADMIN — DONEPEZIL HYDROCHLORIDE 10 MG: 5 TABLET, FILM COATED ORAL at 11:12

## 2019-12-14 NOTE — ED TRIAGE NOTES
EMS reports family called after becoming concerned that pt was having bilateral leg weakness that began last night and didn't know if it was related to her heart or not. EMS reports Pt has hx of dementia

## 2019-12-15 PROBLEM — E87.6 HYPOKALEMIA: Status: ACTIVE | Noted: 2019-12-15

## 2019-12-15 LAB
ANION GAP SERPL CALC-SCNC: 11 MMOL/L (ref 8–16)
BUN SERPL-MCNC: 37 MG/DL (ref 8–23)
CALCIUM SERPL-MCNC: 8.1 MG/DL (ref 8.7–10.5)
CHLORIDE SERPL-SCNC: 97 MMOL/L (ref 95–110)
CO2 SERPL-SCNC: 27 MMOL/L (ref 23–29)
CREAT SERPL-MCNC: 2.8 MG/DL (ref 0.5–1.4)
ERYTHROCYTE [DISTWIDTH] IN BLOOD BY AUTOMATED COUNT: 14.6 % (ref 11.5–14.5)
EST. GFR  (AFRICAN AMERICAN): 18 ML/MIN/1.73 M^2
EST. GFR  (NON AFRICAN AMERICAN): 15.6 ML/MIN/1.73 M^2
FOLATE SERPL-MCNC: 4 NG/ML (ref 4–24)
GLUCOSE SERPL-MCNC: 126 MG/DL (ref 70–110)
GLUCOSE SERPL-MCNC: 193 MG/DL (ref 70–110)
GLUCOSE SERPL-MCNC: 88 MG/DL (ref 70–110)
GLUCOSE SERPL-MCNC: 89 MG/DL (ref 70–110)
GLUCOSE SERPL-MCNC: 93 MG/DL (ref 70–110)
HCT VFR BLD AUTO: 33 % (ref 37–48.5)
HGB BLD-MCNC: 10.8 G/DL (ref 12–16)
MAGNESIUM SERPL-MCNC: 1.8 MG/DL (ref 1.6–2.6)
MCH RBC QN AUTO: 29.4 PG (ref 27–31)
MCHC RBC AUTO-ENTMCNC: 32.7 G/DL (ref 32–36)
MCV RBC AUTO: 90 FL (ref 82–98)
PLATELET # BLD AUTO: 217 K/UL (ref 150–350)
PMV BLD AUTO: 10.6 FL (ref 9.2–12.9)
POTASSIUM SERPL-SCNC: 3.1 MMOL/L (ref 3.5–5.1)
RBC # BLD AUTO: 3.67 M/UL (ref 4–5.4)
SODIUM SERPL-SCNC: 135 MMOL/L (ref 136–145)
TROPONIN I SERPL DL<=0.01 NG/ML-MCNC: 0.03 NG/ML (ref 0.02–0.04)
TROPONIN I SERPL DL<=0.01 NG/ML-MCNC: 0.04 NG/ML (ref 0.02–0.04)
VIT B12 SERPL-MCNC: 851 PG/ML (ref 210–950)
WBC # BLD AUTO: 16.08 K/UL (ref 3.9–12.7)

## 2019-12-15 PROCEDURE — 99900035 HC TECH TIME PER 15 MIN (STAT)

## 2019-12-15 PROCEDURE — 84484 ASSAY OF TROPONIN QUANT: CPT

## 2019-12-15 PROCEDURE — 25000003 PHARM REV CODE 250: Performed by: INTERNAL MEDICINE

## 2019-12-15 PROCEDURE — 94761 N-INVAS EAR/PLS OXIMETRY MLT: CPT

## 2019-12-15 PROCEDURE — 63600175 PHARM REV CODE 636 W HCPCS: Performed by: HOSPITALIST

## 2019-12-15 PROCEDURE — 80048 BASIC METABOLIC PNL TOTAL CA: CPT

## 2019-12-15 PROCEDURE — 84484 ASSAY OF TROPONIN QUANT: CPT | Mod: 91

## 2019-12-15 PROCEDURE — G0378 HOSPITAL OBSERVATION PER HR: HCPCS

## 2019-12-15 PROCEDURE — 36415 COLL VENOUS BLD VENIPUNCTURE: CPT

## 2019-12-15 PROCEDURE — 82746 ASSAY OF FOLIC ACID SERUM: CPT

## 2019-12-15 PROCEDURE — 85027 COMPLETE CBC AUTOMATED: CPT

## 2019-12-15 PROCEDURE — 82607 VITAMIN B-12: CPT

## 2019-12-15 PROCEDURE — 97163 PT EVAL HIGH COMPLEX 45 MIN: CPT

## 2019-12-15 PROCEDURE — 27000221 HC OXYGEN, UP TO 24 HOURS

## 2019-12-15 PROCEDURE — 25000003 PHARM REV CODE 250: Performed by: HOSPITALIST

## 2019-12-15 PROCEDURE — 82962 GLUCOSE BLOOD TEST: CPT

## 2019-12-15 PROCEDURE — 83735 ASSAY OF MAGNESIUM: CPT

## 2019-12-15 RX ORDER — ACETAMINOPHEN 325 MG/1
650 TABLET ORAL EVERY 6 HOURS PRN
Status: DISCONTINUED | OUTPATIENT
Start: 2019-12-15 | End: 2019-12-18 | Stop reason: HOSPADM

## 2019-12-15 RX ORDER — BENZONATATE 100 MG/1
200 CAPSULE ORAL 3 TIMES DAILY PRN
Status: DISCONTINUED | OUTPATIENT
Start: 2019-12-15 | End: 2019-12-18 | Stop reason: HOSPADM

## 2019-12-15 RX ADMIN — CEFTRIAXONE 1 G: 1 INJECTION, SOLUTION INTRAVENOUS at 09:12

## 2019-12-15 RX ADMIN — MEMANTINE HYDROCHLORIDE 5 MG: 5 TABLET ORAL at 08:12

## 2019-12-15 RX ADMIN — CLOPIDOGREL BISULFATE 75 MG: 75 TABLET, FILM COATED ORAL at 08:12

## 2019-12-15 RX ADMIN — ENOXAPARIN SODIUM 30 MG: 100 INJECTION SUBCUTANEOUS at 04:12

## 2019-12-15 RX ADMIN — DONEPEZIL HYDROCHLORIDE 10 MG: 5 TABLET, FILM COATED ORAL at 08:12

## 2019-12-15 RX ADMIN — SODIUM CHLORIDE: 0.9 INJECTION, SOLUTION INTRAVENOUS at 07:12

## 2019-12-15 RX ADMIN — ASPIRIN 81 MG: 81 TABLET, DELAYED RELEASE ORAL at 08:12

## 2019-12-15 RX ADMIN — ACETAMINOPHEN 650 MG: 325 TABLET ORAL at 08:12

## 2019-12-15 RX ADMIN — ONDANSETRON 4 MG: 2 INJECTION INTRAMUSCULAR; INTRAVENOUS at 08:12

## 2019-12-15 RX ADMIN — SOTALOL HYDROCHLORIDE 40 MG: 80 TABLET ORAL at 08:12

## 2019-12-15 RX ADMIN — BENZONATATE 200 MG: 100 CAPSULE ORAL at 08:12

## 2019-12-15 RX ADMIN — LEVOTHYROXINE SODIUM 125 MCG: 25 TABLET ORAL at 06:12

## 2019-12-15 NOTE — ASSESSMENT & PLAN NOTE
Patient with partially treated urinary tract infection present on admission  Was on Bactrim, will discontinue secondary to kidney failure  Cultures pending

## 2019-12-15 NOTE — PLAN OF CARE
12/15/19 1303   LR Message   Medicare Outpatient and Observation Notification regarding financial responsibility Given to patient/caregiver;Explained to patient/caregiver;Signed/date by patient/caregiver   Date LR was signed 12/15/19   Time LR was signed [id number]

## 2019-12-15 NOTE — PLAN OF CARE
Problem: Infection  Goal: Infection Symptom Resolution  12/15/2019 0659 by Elaine Latham RN  Outcome: Ongoing, Progressing  12/15/2019 0526 by Ealine Latham RN  Outcome: Ongoing, Progressing     Problem: Fall Injury Risk  Goal: Absence of Fall and Fall-Related Injury  12/15/2019 0659 by Elaine Latham RN  Outcome: Ongoing, Progressing  12/15/2019 0526 by Elaine Latham RN  Outcome: Ongoing, Progressing     Problem: Adult Inpatient Plan of Care  Goal: Plan of Care Review  12/15/2019 0659 by Elaine Latham RN  Outcome: Ongoing, Progressing  12/15/2019 0526 by Elaine Latham RN  Outcome: Ongoing, Progressing  Goal: Patient-Specific Goal (Individualization)  12/15/2019 0659 by Elaine Latham RN  Outcome: Ongoing, Progressing  12/15/2019 0526 by Elaine Latham RN  Outcome: Ongoing, Progressing  Goal: Absence of Hospital-Acquired Illness or Injury  12/15/2019 0659 by Elaine Latham RN  Outcome: Ongoing, Progressing  12/15/2019 0526 by Elaine Latham RN  Outcome: Ongoing, Progressing  Goal: Optimal Comfort and Wellbeing  12/15/2019 0659 by Elaine Latham RN  Outcome: Ongoing, Progressing  12/15/2019 0526 by Elaine Latham RN  Outcome: Ongoing, Progressing  Goal: Readiness for Transition of Care  12/15/2019 0659 by Elaine Latham RN  Outcome: Ongoing, Progressing  12/15/2019 0526 by Elaine Latham RN  Outcome: Ongoing, Progressing  Goal: Rounds/Family Conference  12/15/2019 0659 by Elaine Latham RN  Outcome: Ongoing, Progressing  12/15/2019 0526 by Elaine Latham RN  Outcome: Ongoing, Progressing     Problem: Diabetes Comorbidity  Goal: Blood Glucose Level Within Desired Range  12/15/2019 0659 by Elaine Latham RN  Outcome: Ongoing, Progressing  12/15/2019 0526 by Elaine Latham RN  Outcome: Ongoing, Progressing     Problem: Skin Injury Risk Increased  Goal: Skin Health and Integrity  12/15/2019 0659 by Elaine Latham RN  Outcome: Ongoing, Progressing  12/15/2019 0526 by Elaine Latham RN  Outcome: Ongoing, Progressing

## 2019-12-15 NOTE — ED NOTES
Unable to initiate IV access, requested IV access with ultrasound guidance. Dr. Todd states he will place US IV.

## 2019-12-15 NOTE — HPI
Patient is a 79-year-old female with past medical history of CHF, CAD (recent NSTEMI), paroxysmal atrial fibrillation, dementia, diabetes, high cholesterol, hypertension, depression, hypothyroidism and CKD presents to the ER from [facility] for generalized weakness. The Pt's daughters who is at bedside states that patient is having increasing difficulties ambulating with her walker and has had frequent falls including today..  Per the daughter's the patient fell  today and hit  her head.   Daughter states that patient is acting her baseline.  Patient denies loss of consciousness.The daughters state that the pt has also had a cough for approx 3 weeks that is nonproductive and is currently being treated for a UTI with Bactrim. Patient denies fever chills abdominal pain chest pain shortness of breath or extremity pain.  Patient also denies headache.

## 2019-12-15 NOTE — ASSESSMENT & PLAN NOTE
Probably multifactorial.  Will treat underlying infection and acute kidney injury  PT evaluation

## 2019-12-15 NOTE — SUBJECTIVE & OBJECTIVE
Interval History: patient seen sitting up in chair. Did not eat breakfast this morning because she has a poor appetite. Daughter at bedside. Unsure how long patient was previously treated with bactrim    Review of Systems   Constitutional: Positive for appetite change. Negative for chills and fever.   HENT: Negative for congestion.    Respiratory: Negative for shortness of breath and wheezing.    Cardiovascular: Negative for chest pain and palpitations.   Gastrointestinal: Positive for nausea. Negative for constipation, diarrhea and vomiting.   Genitourinary: Negative for dysuria, flank pain, urgency and vaginal discharge.   Musculoskeletal: Negative for back pain and myalgias.   Neurological: Positive for weakness.     Objective:     Vital Signs (Most Recent):  Temp: 100 °F (37.8 °C) (12/15/19 0813)  Pulse: 70 (12/15/19 0813)  Resp: 18 (12/15/19 0813)  BP: 113/62 (12/15/19 0813)  SpO2: (!) 94 % (12/15/19 0813) Vital Signs (24h Range):  Temp:  [98.3 °F (36.8 °C)-100 °F (37.8 °C)] 100 °F (37.8 °C)  Pulse:  [60-79] 70  Resp:  [18-24] 18  SpO2:  [92 %-97 %] 94 %  BP: ()/(50-62) 113/62     Weight: 102.8 kg (226 lb 10.1 oz)  Body mass index is 37.71 kg/m².    Intake/Output Summary (Last 24 hours) at 12/15/2019 1002  Last data filed at 12/15/2019 0600  Gross per 24 hour   Intake 1710 ml   Output 900 ml   Net 810 ml      Physical Exam   Constitutional: She is oriented to person, place, and time. No distress.   Eyes: Pupils are equal, round, and reactive to light. No scleral icterus.   Cardiovascular: Normal rate and regular rhythm.   No murmur heard.  1+ bilateral pedal edema   Pulmonary/Chest: Breath sounds normal. She has no wheezes. She has no rales.   Abdominal: Soft. She exhibits no distension. There is no tenderness.   Genitourinary:   Genitourinary Comments: Dai catheter in place   Neurological: She is alert and oriented to person, place, and time.   Skin: Skin is warm. Capillary refill takes less than 2  seconds. No rash noted.   Psychiatric: She has a normal mood and affect.   Nursing note and vitals reviewed.      Significant Labs:   BMP:   Recent Labs   Lab 12/15/19  0614   GLU 89   *   K 3.1*   CL 97   CO2 27   BUN 37*   CREATININE 2.8*   CALCIUM 8.1*   MG 1.8     CBC:   Recent Labs   Lab 12/14/19  1927 12/15/19  0614   WBC 17.06* 16.08*   HGB 11.6* 10.8*   HCT 35.4* 33.0*    217     Magnesium:   Recent Labs   Lab 12/15/19  0614   MG 1.8     Urine Culture: No results for input(s): LABURIN in the last 48 hours.  Urine Studies:   Recent Labs   Lab 12/14/19 1954   COLORU Yellow   APPEARANCEUA Hazy*   PHUR 6.0   SPECGRAV 1.015   PROTEINUA Trace*   GLUCUA Negative   KETONESU Negative   BILIRUBINUA Negative   OCCULTUA Trace*   NITRITE Negative   UROBILINOGEN Negative   LEUKOCYTESUR 1+*   RBCUA 2   WBCUA 3   BACTERIA Few*   SQUAMEPITHEL 10   HYALINECASTS 115*       Significant Imaging: I have reviewed all pertinent imaging results/findings within the past 24 hours.

## 2019-12-15 NOTE — ASSESSMENT & PLAN NOTE
History of coronary artery disease and stents  Recent non ST elevation MI in October 2019   ?  Proximal AFib -not clear

## 2019-12-15 NOTE — NURSING
Pt/daughter instructed on need to have good to monitor intake and output per doctor order.  Pt/family instructed on technique.  Voiced understanding.  Good placed using sterile technique.  Pt tolerated well.

## 2019-12-15 NOTE — ED PROVIDER NOTES
Encounter Date: 12/14/2019       History     Chief Complaint   Patient presents with    Weakness     Times several days     HPI   79-year-old female with past medical history of CHF, CAD (recent NSTEMI), paroxysmal atrial fibrillation, dementia, diabetes, high cholesterol, hypertension, depression, hypothyroidism and CKD presents to the ER from [facility] for generalized weakness. The Pt's daughters who are bedside state that the Pt is having increasing difficulties ambulating with her walker has needed frequent lift assists.  Per the daughter's, the patient has also had several falls and fell today striking her head.  Patient denies loss of consciousness.The daughters state that the pt has also had a cough for approx 3 weeks that is nonproductive and is currently being treated for a UTI with Bactrim.   Review of patient's allergies indicates:  No Known Allergies  Past Medical History:   Diagnosis Date    Alzheimer disease     Anemia     Atrial fibrillation     Cataract     CHF (congestive heart failure)     Chronic kidney disease     Coronary artery disease     Dementia     Depression     Diabetes mellitus     GERD (gastroesophageal reflux disease)     Hyperlipidemia     Hypertension     Hypothyroidism     Osteoarthritis     Renal disorder     Spinal stenosis      Past Surgical History:   Procedure Laterality Date    CARDIAC SURGERY      pci x 8    CHOLECYSTECTOMY      LEFT HEART CATHETERIZATION Left 10/6/2019    Procedure: Left heart cath;  Surgeon: Maikel Maradiaga MD;  Location: [facility] CATH/EP LAB;  Service: Cardiology;  Laterality: Left;     Family History   Problem Relation Age of Onset    COPD Mother     Diabetes Mother     Heart disease Mother     Hyperlipidemia Mother     Hypertension Mother     Heart disease Father     Cancer Sister         Ovarian cancer     Social History     Tobacco Use    Smoking status: Former Smoker    Tobacco comment: Right in 1986   Substance Use  Topics    Alcohol use: Never     Frequency: Never    Drug use: Never     Review of Systems   Unable to perform ROS: Dementia       Physical Exam     Initial Vitals [12/14/19 1753]   BP Pulse Resp Temp SpO2   (!) 106/57 70 (!) 24 99.7 °F (37.6 °C) 96 %      MAP       --         Physical Exam    Nursing note and vitals reviewed.  Constitutional: Vital signs are normal. She appears well-developed and well-nourished. She is not diaphoretic. She is cooperative.  Non-toxic appearance. She does not have a sickly appearance. She does not appear ill. No distress.   HENT:   Head: Normocephalic and atraumatic. Head is without abrasion, without right periorbital erythema and without left periorbital erythema.   Right Ear: Hearing and external ear normal. No drainage or tenderness.   Left Ear: Hearing and external ear normal. No drainage or tenderness.   Nose: Rhinorrhea present. No sinus tenderness or nasal septal hematoma. No epistaxis.  No foreign bodies. Right sinus exhibits no frontal sinus tenderness. Left sinus exhibits no frontal sinus tenderness.   Mouth/Throat: Uvula is midline and mucous membranes are normal. No oral lesions. No trismus in the jaw. No dental abscesses or uvula swelling. No oropharyngeal exudate, posterior oropharyngeal edema, posterior oropharyngeal erythema or tonsillar abscesses.   Eyes: Lids are normal. Pupils are equal, round, and reactive to light. Right eye exhibits no chemosis, no discharge and no exudate. Left eye exhibits no chemosis, no discharge and no exudate. Right conjunctiva is not injected. Right conjunctiva has no hemorrhage. Left conjunctiva is not injected. Left conjunctiva has no hemorrhage. No scleral icterus. Right eye exhibits normal extraocular motion. Left eye exhibits normal extraocular motion.   Neck: Trachea normal and normal range of motion. Neck supple. No stridor present. No spinous process tenderness and no muscular tenderness present. No tracheal deviation and no  edema present. No neck rigidity. No JVD present.   Cardiovascular: Regular rhythm, normal heart sounds and normal pulses.   Pulmonary/Chest: No respiratory distress. She has no wheezes.   Abdominal: Soft. Bowel sounds are normal. She exhibits no distension, no ascites and no mass. There is no hepatosplenomegaly. There is no tenderness. There is no rigidity, no rebound, no guarding and no CVA tenderness. Hernia confirmed negative in the ventral area.   Musculoskeletal: Normal range of motion.   Lymphadenopathy:     She has no cervical adenopathy.   Neurological: She is alert. She has normal strength. No cranial nerve deficit or sensory deficit.   Skin: Skin is warm. Capillary refill takes less than 2 seconds. No ecchymosis, no lesion and no rash noted. No erythema.   Psychiatric: She has a normal mood and affect. Her speech is normal.         ED Course   Procedures  Labs Reviewed   CBC W/ AUTO DIFFERENTIAL - Abnormal; Notable for the following components:       Result Value    WBC 17.06 (*)     RBC 3.99 (*)     Hemoglobin 11.6 (*)     Hematocrit 35.4 (*)     RDW 14.6 (*)     Immature Granulocytes 0.9 (*)     Gran # (ANC) 14.2 (*)     Immature Grans (Abs) 0.15 (*)     Gran% 83.1 (*)     Lymph% 10.1 (*)     All other components within normal limits   COMPREHENSIVE METABOLIC PANEL - Abnormal; Notable for the following components:    Sodium 130 (*)     Chloride 90 (*)     Glucose 117 (*)     BUN, Bld 42 (*)     Creatinine 3.5 (*)     Albumin 3.1 (*)     Total Bilirubin 1.3 (*)     Alkaline Phosphatase 329 (*)      (*)      (*)     eGFR if  13.7 (*)     eGFR if non  11.9 (*)     All other components within normal limits   URINALYSIS - Abnormal; Notable for the following components:    Appearance, UA Hazy (*)     Protein, UA Trace (*)     Occult Blood UA Trace (*)     Leukocytes, UA 1+ (*)     All other components within normal limits   URINALYSIS MICROSCOPIC - Abnormal;  Notable for the following components:    Bacteria Few (*)     Hyaline Casts,  (*)     All other components within normal limits   TROPONIN I   B-TYPE NATRIURETIC PEPTIDE   INFLUENZA A AND B ANTIGEN    Narrative:     Specimen Source->Nasopharyngeal Swab   TSH   T4, FREE   TSH   T4, FREE   URINALYSIS, REFLEX TO URINE CULTURE   VITAMIN B12   FOLATE   ISTAT LACTATE   POCT LACTATE     EKG Readings: (Independently Interpreted)   Normal sinus at 72.  P are 140, QRS 82, .  Nonspecific T-wave changes.  Abnormal EKG       Imaging Results          CT Head Without Contrast (Final result)  Result time 12/14/19 19:15:17    Final result by Adonis Arugelles Jr., MD (12/14/19 19:15:17)                 Impression:      1. No evidence of acute intracranial findings.  2. Generalized cortical and central involutional changes with evidence of background deep white matter ischemic changes.  3. Prominent cephalohematoma formation projecting over the right parietooccipital convexity extending cephalad perhaps related to recent traumatic episode.      Electronically signed by: Adonis Arguelles MD  Date:    12/14/2019  Time:    19:15             Narrative:    EXAMINATION:  CT HEAD WITHOUT CONTRAST    CLINICAL HISTORY:  Confusion/delirium, altered LOC, unexplained;    TECHNIQUE:  Low dose axial images were obtained through the head.  Coronal and sagittal reformations were also performed. Contrast was not administered.    COMPARISON:  None.    FINDINGS:  Crescentic high density focus marginates the outer cortical table spanning the right parietooccipital zone extending cephalad secondary rather large cephalohematoma formation.  There is evidence of generalized cortical and central involutional changes that appear age-appropriate as manifested by deepening the cortical sulci and widening the cranial fissures.  The superimposed low-density areas are established throughout the bilateral Diencephalic hemispheres secondary to evolving  deep white matter ischemia.  No evidence of intraparenchymal hemorrhage or mass lesion or acute cortical infarct.  Ventricles are prominent in size congruent with degree of cerebral atrophy.  No significant subdural or epidural collections.  Orbits retrobulbar compartments, pituitary gland, and pituitary stock are normal.                               X-Ray Chest PA And Lateral (Final result)  Result time 12/14/19 18:45:27    Final result by Adonis Arguelles Jr., MD (12/14/19 18:45:27)                 Impression:      No acute abnormality.      Electronically signed by: Adonis Arguelles MD  Date:    12/14/2019  Time:    18:45             Narrative:    EXAMINATION:  XR CHEST PA AND LATERAL    CLINICAL HISTORY:  Cough;    TECHNIQUE:  PA and lateral views of the chest were performed.    COMPARISON:  10/05/2019    FINDINGS:  The lungs are clear, with normal appearance of pulmonary vasculature and no pleural effusion or pneumothorax.    The cardiac silhouette is normal in size. The hilar and mediastinal contours are unremarkable.    Bones are intact.                                 Medical Decision Making:   ED Management:  Patient with a KI.  Patient's last creatinine was 1.7 on the 10th.  Patient's creatinine today is 3.5. Pt is on diuretic.  Patient has mild hyponatremia of 130.  Possible cause from Bactrim vs possible hypothyroidism.  Patient also has a mild bump in LFTs.  Will provide patient gentle IV hydration.  Urinalysis pending.                   ED Course as of Dec 14 2148   Sat Dec 14, 2019   1909 Iv access and labs pending      [MP]   1946 UA pending      [MP]   2035 UA pending    [MP]   2044 Microscopic Comment: SEE COMMENT [MP]   2045 Microscopic Comment: SEE COMMENT [MP]   2045 Microscopic Comment: SEE COMMENT [MP]      ED Course User Index  [MP] Saurabh Todd MD                Clinical Impression:       ICD-10-CM ICD-9-CM   1. Hyponatremia E87.1 276.1   2. NIKKI (acute kidney injury) N17.9 584.9   3.  Weakness R53.1 780.79   4. Urinary tract infection without hematuria, site unspecified N39.0 599.0                             Saurabh Todd MD  12/14/19 [id number]

## 2019-12-15 NOTE — NURSING
Pt's daughter states pt is so weak now that she can't even walk without her legs giving out on her.

## 2019-12-15 NOTE — PLAN OF CARE
Problem: Infection  Goal: Infection Symptom Resolution  Outcome: Ongoing, Progressing     Problem: Fall Injury Risk  Goal: Absence of Fall and Fall-Related Injury  Outcome: Ongoing, Progressing     Problem: Adult Inpatient Plan of Care  Goal: Plan of Care Review  Outcome: Ongoing, Progressing  Goal: Patient-Specific Goal (Individualization)  Outcome: Ongoing, Progressing  Goal: Absence of Hospital-Acquired Illness or Injury  Outcome: Ongoing, Progressing  Goal: Optimal Comfort and Wellbeing  Outcome: Ongoing, Progressing  Goal: Readiness for Transition of Care  Outcome: Ongoing, Progressing  Goal: Rounds/Family Conference  Outcome: Ongoing, Progressing     Problem: Diabetes Comorbidity  Goal: Blood Glucose Level Within Desired Range  Outcome: Ongoing, Progressing     Problem: Skin Injury Risk Increased  Goal: Skin Health and Integrity  Outcome: Ongoing, Progressing

## 2019-12-15 NOTE — H&P
[facility]  History & Physical    Patient Name: Katy Melchor  MRN: [id number]  Admission Date: 12/14/2019  Attending Physician: Riley Tarango DO   Primary Care Provider: Julius Nguyen MD         Patient information was obtained from patient, relative(s) and ER records. And ED physician    Subjective:     Principal Problem:Acute-on-chronic kidney injury    Chief Complaint:   Chief Complaint   Patient presents with    Weakness     Times several days        HPI: Patient is a 79-year-old female with past medical history of CHF, CAD (recent NSTEMI), paroxysmal atrial fibrillation, dementia, diabetes, high cholesterol, hypertension, depression, hypothyroidism and CKD presents to the ER from [facility] for generalized weakness. The Pt's daughters who is at bedside states that patient is having increasing difficulties ambulating with her walker and has had frequent falls including today..  Per the daughter's the patient fell  today and hit  her head.   Daughter states that patient is acting her baseline.  Patient denies loss of consciousness.The daughters state that the pt has also had a cough for approx 3 weeks that is nonproductive and is currently being treated for a UTI with Bactrim.   Patient denies fever chills abdominal pain chest pain shortness of breath or extremity pain.  Patient also denies headache    Past Medical History:   Diagnosis Date    Alzheimer disease     Anemia     Atrial fibrillation     Cataract     CHF (congestive heart failure)     Chronic kidney disease     Coronary artery disease     Dementia     Depression     Diabetes mellitus     GERD (gastroesophageal reflux disease)     Hyperlipidemia     Hypertension     Hypothyroidism     Osteoarthritis     Renal disorder     Spinal stenosis        Past Surgical History:   Procedure Laterality Date    CARDIAC SURGERY      pci x 8    CHOLECYSTECTOMY      LEFT HEART CATHETERIZATION Left  10/6/2019    Procedure: Left heart cath;  Surgeon: Maikel Maradiaga MD;  Location: [facility] CATH/EP LAB;  Service: Cardiology;  Laterality: Left;       Review of patient's allergies indicates:  No Known Allergies    No current facility-administered medications on file prior to encounter.      Current Outpatient Medications on File Prior to Encounter   Medication Sig    albuterol (PROVENTIL) 2.5 mg /3 mL (0.083 %) nebulizer solution Inhale 1 vial into the lungs every 6 (six) hours as needed.     amino acids/protein hydrolys (PRO-STAT SUGAR FREE ORAL) Take 30 mLs by mouth once daily.    aspirin (ECOTRIN) 81 MG EC tablet Take 1 tablet (81 mg total) by mouth once daily.    atorvastatin (LIPITOR) 20 MG tablet Take 20 mg by mouth every evening.     bacitracin-polymyxin b (POLYSPORIN) ophthalmic ointment Place 1 application into both eyes every evening. APPLY A TINY STRIP TO FINGERTIP THEN RUB ALONG EYELASH MARGIN OF BOTH EYES    calcium carbonate/vitamin D3 (CALTRATE WITH VITAMIN D3 ORAL) Take 1 tablet by mouth once daily.     clopidogrel (PLAVIX) 75 mg tablet Take 1 tablet (75 mg total) by mouth once daily.    coenzyme Q10 (COQ-10) 100 mg capsule Take 200 mg by mouth once daily.    cyanocobalamin 1,000 mcg/mL injection Inject 1,000 mcg into the muscle every 30 days.     donepezil (ARICEPT) 10 MG tablet Take 10 mg by mouth every evening.    escitalopram oxalate (LEXAPRO) 20 MG tablet Take 20 mg by mouth every evening.     fluticasone propionate (FLONASE) 50 mcg/actuation nasal spray 1 spray by Each Nostril route daily as needed for Rhinitis.    fluticasone-umeclidin-vilanter (TRELEGY ELLIPTA) 100-62.5-25 mcg DsDv Inhale 1 puff into the lungs once daily.    guaifenesin 100 mg/5 ml (ROBITUSSIN) 100 mg/5 mL syrup Take 200 mg by mouth every 4 (four) hours as needed for Cough.     levalbuterol (XOPENEX HFA) 45 mcg/actuation inhaler Inhale 2 puffs into the lungs 2 (two) times daily. Rescue    levothyroxine  (SYNTHROID) 125 MCG tablet Take 125 mcg by mouth before breakfast.     lisinopril (PRINIVIL,ZESTRIL) 2.5 MG tablet Take 1 tablet (2.5 mg total) by mouth once daily.    loratadine (CLARITIN) 10 mg tablet Take 10 mg by mouth daily as needed for Allergies.     memantine (NAMENDA) 5 MG Tab Take 5 mg by mouth 2 (two) times daily.     multivitamin Tab Take 1 tablet by mouth once daily.    polyvinyl alcohol-povidon,PF, (REFRESH CLASSIC, PF,) 1.4-0.6 % Dpet Place 1 drop into both eyes 4 (four) times daily.    potassium chloride (KLOR-CON M20 ORAL) Take 20 mEq by mouth once daily.     SITagliptin (JANUVIA) 50 MG Tab Take 100 mg by mouth once daily.    sodium bicarbonate 650 MG tablet Take 650 mg by mouth 3 (three) times daily.    sotalol (BETAPACE) 80 MG tablet Take 40 mg by mouth 2 (two) times daily.    torsemide (DEMADEX) 20 MG Tab Take 30 mg by mouth once daily.     [DISCONTINUED] fluticasone propionate (FLOVENT DISKUS) 50 mcg/actuation DsDv Inhale 1 spray into the lungs daily as needed. Controller    [DISCONTINUED] tetrahydrozoline/polyethyl gly (EYE DROPS OPHT) Apply 1 drop to eye 4 (four) times daily.     Family History     Problem Relation (Age of Onset)    COPD Mother    Cancer Sister    Diabetes Mother    Heart disease Mother, Father    Hyperlipidemia Mother    Hypertension Mother        Tobacco Use    Smoking status: Former Smoker    Tobacco comment: Right in 1986   Substance and Sexual Activity    Alcohol use: Never     Frequency: Never    Drug use: Never    Sexual activity: Not Currently     Review of Systems   Constitutional: Negative for chills, fatigue and fever.   HENT: Negative for sinus pressure, sinus pain, sore throat and trouble swallowing.    Respiratory: Positive for cough. Negative for chest tightness and shortness of breath.    Cardiovascular: Negative for chest pain, palpitations and leg swelling.   Gastrointestinal: Negative for abdominal pain, constipation, diarrhea, nausea and  vomiting.   Genitourinary: Negative for dysuria and frequency.   Musculoskeletal: Negative for back pain and neck pain.   Skin: Negative for rash.   Neurological: Positive for weakness. Negative for seizures, syncope and headaches.   Psychiatric/Behavioral: Positive for confusion. The patient is not nervous/anxious.      Objective:     Vital Signs (Most Recent):  Temp: 99.8 °F (37.7 °C) (12/14/19 2030)  Pulse: 68 (12/14/19 2000)  Resp: (!) 24 (12/14/19 1753)  BP: (!) 106/53 (12/14/19 2000)  SpO2: 95 % (12/14/19 2000) Vital Signs (24h Range):  Temp:  [99.7 °F (37.6 °C)-99.8 °F (37.7 °C)] 99.8 °F (37.7 °C)  Pulse:  [68-70] 68  Resp:  [24] 24  SpO2:  [95 %-96 %] 95 %  BP: (106)/(53-57) 106/53     Weight: 113.4 kg (250 lb)  Body mass index is 41.6 kg/m².    Physical Exam   Constitutional: She appears well-developed and well-nourished.   Daughter is at bedside  Patient is lying in a gurney in the emergency department appears comfortable   Eyes: Pupils are equal, round, and reactive to light. Conjunctivae and EOM are normal. No scleral icterus.   Sclera non icteric   Neck: Neck supple.   Cardiovascular: Normal rate and normal heart sounds.   Pulmonary/Chest: Effort normal and breath sounds normal.   Abdominal: Soft. Bowel sounds are normal. There is no tenderness.   Musculoskeletal: Normal range of motion.   1+ edema bilateral lower extremities   Neurological: She is alert.   Patient is alert she is oriented to name and daughter  Moves all extremities equally   Skin: Skin is warm. No rash noted.   Psychiatric:   Pleasant cooperative   Nursing note and vitals reviewed.         Significant Labs:   BMP:   Recent Labs   Lab 12/14/19 1927   *   *   K 3.6   CL 90*   CO2 28   BUN 42*   CREATININE 3.5*   CALCIUM 8.9     CBC:   Recent Labs   Lab 12/14/19 1927   WBC 17.06*   HGB 11.6*   HCT 35.4*        CMP:   Recent Labs   Lab 12/14/19 1927   *   K 3.6   CL 90*   CO2 28   *   BUN 42*    CREATININE 3.5*   CALCIUM 8.9   PROT 7.4   ALBUMIN 3.1*   BILITOT 1.3*   ALKPHOS 329*   *   *   ANIONGAP 12   EGFRNONAA 11.9*     Troponin:   Recent Labs   Lab 12/14/19 1927   TROPONINI 0.036     TSH:   Recent Labs   Lab 12/14/19 1927   TSH 0.460     Urine Studies:   Recent Labs   Lab 12/14/19 1954   COLORU Yellow   APPEARANCEUA Hazy*   PHUR 6.0   SPECGRAV 1.015   PROTEINUA Trace*   GLUCUA Negative   KETONESU Negative   BILIRUBINUA Negative   OCCULTUA Trace*   NITRITE Negative   UROBILINOGEN Negative   LEUKOCYTESUR 1+*   RBCUA 2   WBCUA 3   BACTERIA Few*   SQUAMEPITHEL 10   HYALINECASTS 115*       Significant Imaging:  Chest x-ray read by me shows no acute process  ECG is pending  CT that shows:  Impression       1. No evidence of acute intracranial findings.  2. Generalized cortical and central involutional changes with evidence of background deep white matter ischemic changes.  3. Prominent cephalohematoma formation projecting over the right parietooccipital convexity extending cephalad perhaps related to recent traumatic episode.           Assessment/Plan:     * Acute-on-chronic kidney injury  Patient with acute on chronic kidney disease  Was recently started on Bactrim which is most likely contributing  Will hydrate.  Hold diuretics  Followed daily labs  Consult renal  Place Dai  Strict I&Os      Generalized weakness  Probably multifactorial.  Will treat underlying infection and acute kidney injury  PT evaluation      Hyponatremia  Will start IV fluids  Follow serial labs  D/C diuretic      Dementia  Patient with history of dementia  Will resume home meds      UTI (urinary tract infection)  Patient with partially treated urinary tract infection present on admission  Was on Bactrim, will discontinue secondary to kidney failure  Cultures pending      Coronary disease  History of coronary artery disease and stents  Recent non ST elevation MI in October 2019   ?  Proximal AFib -not  clear    Diabetes mellitus, type 2  Patient with a history of diabetes type 2  Follow with sliding scale      VTE Risk Mitigation (From admission, onward)         Ordered     enoxaparin injection 30 mg  Daily      12/14/19 2112     IP VTE HIGH RISK PATIENT  Once      12/14/19 2112               Nursing home will not provide accurate med list as  requested by ED this evening  Will need to try to obtain in a.m.  PTevaluation  Urine culture  Empiric antibiotics      Riley Tarango DO  Department of Hospital Medicine   [facility]

## 2019-12-15 NOTE — PT/OT/SLP EVAL
Physical Therapy Evaluation    Patient Name:  Katy Melchor   MRN:  [id number]    Recommendations:     Discharge Recommendations:  (back to nursing [facility] ([place]))   Discharge Equipment Recommendations: none   Barriers to discharge: None, 2/2 pt living at nursing home prior     Assessment:     Katy Melchor is a 79 y.o. female admitted with a medical diagnosis of Acute-on-chronic kidney injury.  She presents with the following impairments/functional limitations:  weakness, impaired endurance, impaired sensation, impaired functional mobilty, gait instability, impaired balance, impaired cognition. Upon questionning, patient alert to name and location only, unable to state year or president. Pt requiring max A to perform supine to sit and moderate assist from wheelchair to bedside chair. Pt was able to perform sit to stand with min A with use of RW. While patient was able to walk 50 ft w/ RW and min A, pt requiring constant VC to keep walker close and significant encouragement to walk, as patient very anxious about falling. Pt's daughter providing wheelchair follow. Pt left up in chair with daughter present and call button in reach. Pt would benefit from further skilled PT to increase independence, as pt previously mod I w/ RW.     Rehab Prognosis: Good; patient would benefit from acute skilled PT services to address these deficits and reach maximum level of function.    Recent Surgery: * No surgery found *      Plan:     During this hospitalization, patient to be seen daily to address the identified rehab impairments via gait training, therapeutic activities, therapeutic exercises, neuromuscular re-education and progress toward the following goals:    · Plan of Care Expires:  01/14/20    Subjective     Chief Complaint: weakness  Patient/Family Comments/goals: back to [facility]  Pain/Comfort:  ·  Not assessed    Patients cultural, spiritual, Jew conflicts given the current situation:      Living  Environment:  Living at [facility]  Prior to admission, patients level of function was mod I w/ RW at nursing home.  Equipment used at home: walker, rolling.  DME owned (not currently used): none.  Upon discharge, patient will have assistance from nursing home.    Objective:     Communicated with Nsg and daughter prior to session.  Patient found supine with telemetry, bed alarm, peripheral IV, supplemental O2,  upon PT entry to room.    General Precautions: Standard, fall   Orthopedic Precautions:N/A   Braces: N/A     Exams:  · Cognitive Exam:  Patient is oriented to Person and Place  · Sensation:  Impaired to plantar feet bilaterally  · RUE Strength: WFL  · LUE Strength: WFL  · RLE ROM: WFL  · RLE Strength: 3+/5  · LLE ROM: WFL  · LLE Strength: 3+/5    Functional Mobility:  · Bed Mobility:     · Supine to Sit: maximal assistance  · Transfers:     · Sit to Stand:  minimum assistance with rolling walker  · Bed to Chair: moderate assistance with  no AD  using  Stand Pivot  · Gait: 50 ft w/ RW and min A and VC to stay inside RW; decreased marlen and step length  · Balance: sitting: normal; standing: poor      Therapeutic Activities and Exercises:   none    AM-PAC 6 CLICK MOBILITY  Total Score:13     Patient left up in chair with all lines intact, call button in reach and daughter present.    GOALS:   Multidisciplinary Problems     Physical Therapy Goals        Problem: Physical Therapy Goal    Goal Priority Disciplines Outcome Goal Variances Interventions   Physical Therapy Goal     PT, PT/OT Ongoing, Progressing                     History:     Past Medical History:   Diagnosis Date    Alzheimer disease     Anemia     Atrial fibrillation     Cataract     CHF (congestive heart failure)     Chronic kidney disease     COPD (chronic obstructive pulmonary disease)     per daughter, wears O2 at 3 liter continous    Coronary artery disease     Dementia     Depression     Diabetes mellitus     GERD  (gastroesophageal reflux disease)     Hyperlipidemia     Hypertension     Hypothyroidism     Osteoarthritis     Renal disorder     Spinal stenosis        Past Surgical History:   Procedure Laterality Date    CARDIAC SURGERY      pci x 8    CHOLECYSTECTOMY      EYE SURGERY      hx cataract sugery that didn't work per daughter    HYSTERECTOMY      per daughter    LEFT HEART CATHETERIZATION Left 10/6/2019    Procedure: Left heart cath;  Surgeon: Maikel Maradiaga MD;  Location: [facility] CATH/EP LAB;  Service: Cardiology;  Laterality: Left;       Time Tracking:     PT Received On: 12/15/19  PT Start Time: 0753     PT Stop Time: 0833  PT Total Time (min): 40 min     Billable Minutes: Evaluation 40 minutes       Quique Dodson, PT  12/15/2019

## 2019-12-15 NOTE — NURSING
Pt awake in bed, confused and oriented to self.  Pt states she doesn't know where she is and why she is here.  Daughter at bedside to stay the night.  Pt reoriented prn.  IV hydration in progress.

## 2019-12-15 NOTE — PLAN OF CARE
Pt free of accidental injury during shift. No s/s of distress noted. Denies pain at present. Able to make needs known. Family at bedside throughout shift. IVF infusing as ordered. Dai to gravity draining clear yellow urine. Safety measures maintained. Call bell within reach. Will continue to monitor

## 2019-12-15 NOTE — ASSESSMENT & PLAN NOTE
Patient with acute on chronic kidney disease  Was recently started on Bactrim which is most likely contributing  Will hydrate.  Hold diuretics  Followed daily labs  Consult renal  Place Dai  Strict I&Os

## 2019-12-15 NOTE — ASSESSMENT & PLAN NOTE
Likely secondary to combination of pre-renal, diuretics and bactrim.  Renal function is improved this morning.  nephrotoxics are currently on hold and she is receiving gentle IV hydration  Continue hydration

## 2019-12-15 NOTE — PROGRESS NOTES
[facility] Medicine  Progress Note    Patient Name: Katy Melchor  MRN: [id number]  Patient Class: OP- Observation   Admission Date: 12/14/2019  Length of Stay: 0 days  Attending Physician: Freida Kang MD  Primary Care Provider: Julius Nguyen MD        Subjective:     Principal Problem:Acute-on-chronic kidney injury        HPI:  Patient is a 79-year-old female with past medical history of CHF, CAD (recent NSTEMI), paroxysmal atrial fibrillation, dementia, diabetes, high cholesterol, hypertension, depression, hypothyroidism and CKD presents to the ER from [facility] for generalized weakness. The Pt's daughters who is at bedside states that patient is having increasing difficulties ambulating with her walker and has had frequent falls including today..  Per the daughter's the patient fell  today and hit  her head.   Daughter states that patient is acting her baseline.  Patient denies loss of consciousness.The daughters state that the pt has also had a cough for approx 3 weeks that is nonproductive and is currently being treated for a UTI with Bactrim.   Patient denies fever chills abdominal pain chest pain shortness of breath or extremity pain.  Patient also denies headache    Overview/Hospital Course:  No notes on file    Interval History: patient seen sitting up in chair. Did not eat breakfast this morning because she has a poor appetite. Daughter at bedside. Unsure how long patient was previously treated with bactrim    Review of Systems   Constitutional: Positive for appetite change. Negative for chills and fever.   HENT: Negative for congestion.    Respiratory: Negative for shortness of breath and wheezing.    Cardiovascular: Negative for chest pain and palpitations.   Gastrointestinal: Positive for nausea. Negative for constipation, diarrhea and vomiting.   Genitourinary: Negative for dysuria, flank pain, urgency and vaginal discharge.   Musculoskeletal: Negative for  back pain and myalgias.   Neurological: Positive for weakness.     Objective:     Vital Signs (Most Recent):  Temp: 100 °F (37.8 °C) (12/15/19 0813)  Pulse: 70 (12/15/19 0813)  Resp: 18 (12/15/19 0813)  BP: 113/62 (12/15/19 0813)  SpO2: (!) 94 % (12/15/19 0813) Vital Signs (24h Range):  Temp:  [98.3 °F (36.8 °C)-100 °F (37.8 °C)] 100 °F (37.8 °C)  Pulse:  [60-79] 70  Resp:  [18-24] 18  SpO2:  [92 %-97 %] 94 %  BP: ()/(50-62) 113/62     Weight: 102.8 kg (226 lb 10.1 oz)  Body mass index is 37.71 kg/m².    Intake/Output Summary (Last 24 hours) at 12/15/2019 1002  Last data filed at 12/15/2019 0600  Gross per 24 hour   Intake 1710 ml   Output 900 ml   Net 810 ml      Physical Exam   Constitutional: She is oriented to person, place, and time. No distress.   Eyes: Pupils are equal, round, and reactive to light. No scleral icterus.   Cardiovascular: Normal rate and regular rhythm.   No murmur heard.  1+ bilateral pedal edema   Pulmonary/Chest: Breath sounds normal. She has no wheezes. She has no rales.   Abdominal: Soft. She exhibits no distension. There is no tenderness.   Genitourinary:   Genitourinary Comments: Dai catheter in place   Neurological: She is alert and oriented to person, place, and time.   Skin: Skin is warm. Capillary refill takes less than 2 seconds. No rash noted.   Psychiatric: She has a normal mood and affect.   Nursing note and vitals reviewed.      Significant Labs:   BMP:   Recent Labs   Lab 12/15/19  0614   GLU 89   *   K 3.1*   CL 97   CO2 27   BUN 37*   CREATININE 2.8*   CALCIUM 8.1*   MG 1.8     CBC:   Recent Labs   Lab 12/14/19  1927 12/15/19  0614   WBC 17.06* 16.08*   HGB 11.6* 10.8*   HCT 35.4* 33.0*    217     Magnesium:   Recent Labs   Lab 12/15/19  0614   MG 1.8     Urine Culture: No results for input(s): LABURIN in the last 48 hours.  Urine Studies:   Recent Labs   Lab 12/14/19 1954   COLORU Yellow   APPEARANCEUA Hazy*   PHUR 6.0   SPECGRAV 1.015   PROTEINUA  Trace*   GLUCUA Negative   KETONESU Negative   BILIRUBINUA Negative   OCCULTUA Trace*   NITRITE Negative   UROBILINOGEN Negative   LEUKOCYTESUR 1+*   RBCUA 2   WBCUA 3   BACTERIA Few*   SQUAMEPITHEL 10   HYALINECASTS 115*       Significant Imaging: I have reviewed all pertinent imaging results/findings within the past 24 hours.      Assessment/Plan:      * Acute-on-chronic kidney injury  Likely secondary to combination of pre-renal, diuretics and bactrim.  Renal function is improved this morning.  nephrotoxics are currently on hold and she is receiving gentle IV hydration  Continue hydration      Hypokalemia  On electrolyte sliding scale . Replace per protocol      UTI (urinary tract infection)  Continue Iv ceftriaxone daily  Follow urine culture      Generalized weakness  Probably multifactorial.  PT evaluation      Diabetes mellitus, type 2  On SSI, ADA diet      Coronary disease  S/p NSTEMi s/p stent  Continue aspirin, plavix, and statin    Dementia  Patient with history of dementia  Will resume home meds      Hyponatremia  Will start IV fluids  Follow serial labs  D/C diuretic        VTE Risk Mitigation (From admission, onward)         Ordered     enoxaparin injection 30 mg  Daily      12/14/19 2112     IP VTE HIGH RISK PATIENT  Once      12/14/19 2112                      Freida Kang MD  Department of Hospital Medicine   [facility]

## 2019-12-15 NOTE — PLAN OF CARE
Goals to be met by: discharge     Patient will increase functional independence with mobility by performing:    . Supine to sit with Stand-by Assistance. Established 12/15- Max A  . Sit to supine with Stand-by Assistance. Established 12/15 NT  . Sit to stand transfer with Stand-by Assistance. Established 12/15- Min A RW  . Bed to chair transfer with Supervision using Rolling Walker. Established 12/15-  mod A stand  pivot  . Gait  x 50  feet with Supervision using Rolling Walker. . Established 12/15- 50 ft RW min A

## 2019-12-16 PROBLEM — M54.2 NECK PAIN: Status: ACTIVE | Noted: 2019-12-16

## 2019-12-16 LAB
ANION GAP SERPL CALC-SCNC: 5 MMOL/L (ref 8–16)
BUN SERPL-MCNC: 20 MG/DL (ref 8–23)
CALCIUM SERPL-MCNC: 8.5 MG/DL (ref 8.7–10.5)
CHLORIDE SERPL-SCNC: 100 MMOL/L (ref 95–110)
CO2 SERPL-SCNC: 29 MMOL/L (ref 23–29)
CREAT SERPL-MCNC: 1.5 MG/DL (ref 0.5–1.4)
EST. GFR  (AFRICAN AMERICAN): 38.2 ML/MIN/1.73 M^2
EST. GFR  (NON AFRICAN AMERICAN): 33.1 ML/MIN/1.73 M^2
GLUCOSE SERPL-MCNC: 122 MG/DL (ref 70–110)
GLUCOSE SERPL-MCNC: 134 MG/DL (ref 70–110)
GLUCOSE SERPL-MCNC: 141 MG/DL (ref 70–110)
POTASSIUM SERPL-SCNC: 3.4 MMOL/L (ref 3.5–5.1)
SODIUM SERPL-SCNC: 134 MMOL/L (ref 136–145)

## 2019-12-16 PROCEDURE — 97530 THERAPEUTIC ACTIVITIES: CPT

## 2019-12-16 PROCEDURE — 27000221 HC OXYGEN, UP TO 24 HOURS

## 2019-12-16 PROCEDURE — 97166 OT EVAL MOD COMPLEX 45 MIN: CPT

## 2019-12-16 PROCEDURE — 63600175 PHARM REV CODE 636 W HCPCS: Performed by: HOSPITALIST

## 2019-12-16 PROCEDURE — 97535 SELF CARE MNGMENT TRAINING: CPT

## 2019-12-16 PROCEDURE — G0378 HOSPITAL OBSERVATION PER HR: HCPCS

## 2019-12-16 PROCEDURE — 63600175 PHARM REV CODE 636 W HCPCS: Performed by: INTERNAL MEDICINE

## 2019-12-16 PROCEDURE — 80048 BASIC METABOLIC PNL TOTAL CA: CPT

## 2019-12-16 PROCEDURE — 25000003 PHARM REV CODE 250: Performed by: INTERNAL MEDICINE

## 2019-12-16 PROCEDURE — 94761 N-INVAS EAR/PLS OXIMETRY MLT: CPT

## 2019-12-16 PROCEDURE — 25000003 PHARM REV CODE 250: Performed by: HOSPITALIST

## 2019-12-16 PROCEDURE — 97116 GAIT TRAINING THERAPY: CPT

## 2019-12-16 PROCEDURE — 36415 COLL VENOUS BLD VENIPUNCTURE: CPT

## 2019-12-16 RX ORDER — INSULIN ASPART 100 [IU]/ML
0-5 INJECTION, SOLUTION INTRAVENOUS; SUBCUTANEOUS
Status: DISCONTINUED | OUTPATIENT
Start: 2019-12-16 | End: 2019-12-18 | Stop reason: HOSPADM

## 2019-12-16 RX ORDER — GLUCAGON 1 MG
1 KIT INJECTION
Status: DISCONTINUED | OUTPATIENT
Start: 2019-12-16 | End: 2019-12-18 | Stop reason: HOSPADM

## 2019-12-16 RX ORDER — IBUPROFEN 200 MG
16 TABLET ORAL
Status: DISCONTINUED | OUTPATIENT
Start: 2019-12-16 | End: 2019-12-18 | Stop reason: HOSPADM

## 2019-12-16 RX ORDER — IBUPROFEN 200 MG
24 TABLET ORAL
Status: DISCONTINUED | OUTPATIENT
Start: 2019-12-16 | End: 2019-12-18 | Stop reason: HOSPADM

## 2019-12-16 RX ORDER — ENOXAPARIN SODIUM 100 MG/ML
40 INJECTION SUBCUTANEOUS EVERY 24 HOURS
Status: DISCONTINUED | OUTPATIENT
Start: 2019-12-16 | End: 2019-12-18 | Stop reason: HOSPADM

## 2019-12-16 RX ADMIN — LEVOTHYROXINE SODIUM 125 MCG: 25 TABLET ORAL at 05:12

## 2019-12-16 RX ADMIN — SOTALOL HYDROCHLORIDE 40 MG: 80 TABLET ORAL at 09:12

## 2019-12-16 RX ADMIN — ASPIRIN 81 MG: 81 TABLET, DELAYED RELEASE ORAL at 09:12

## 2019-12-16 RX ADMIN — MEMANTINE HYDROCHLORIDE 5 MG: 5 TABLET ORAL at 09:12

## 2019-12-16 RX ADMIN — DONEPEZIL HYDROCHLORIDE 10 MG: 5 TABLET, FILM COATED ORAL at 09:12

## 2019-12-16 RX ADMIN — ACETAMINOPHEN 650 MG: 325 TABLET ORAL at 09:12

## 2019-12-16 RX ADMIN — SODIUM CHLORIDE: 0.9 INJECTION, SOLUTION INTRAVENOUS at 05:12

## 2019-12-16 RX ADMIN — CLOPIDOGREL BISULFATE 75 MG: 75 TABLET, FILM COATED ORAL at 09:12

## 2019-12-16 RX ADMIN — ACETAMINOPHEN 650 MG: 325 TABLET ORAL at 07:12

## 2019-12-16 RX ADMIN — ENOXAPARIN SODIUM 40 MG: 100 INJECTION SUBCUTANEOUS at 05:12

## 2019-12-16 NOTE — CONSULTS
INPATIENT NEPHROLOGY CONSULT   [facility]    Patient Name: Katy Melchor  Date: 12/16/2019    Reason for consultation: NIKKI    Chief Complaint:   Chief Complaint   Patient presents with    Weakness     Times several days       History of Present Illness:  Patient is a 79-year-old female with past medical history of CHF, CAD (recent NSTEMI), paroxysmal atrial fibrillation, dementia, diabetes, high cholesterol, hypertension, depression, hypothyroidism and CKD presents to the ER from [facility] for generalized weakness. The Pt's daughters who is at bedside states that patient is having increasing difficulties ambulating with her walker and has had frequent falls including today..  Per the daughter's the patient fell  today and hit her head. Daughter states that patient is acting her baseline.  Patient denies loss of consciousness.The daughters state that the pt has also had a cough for approx 3 weeks that is nonproductive and is currently being treated for a UTI with Bactrim. Patient denies fever chills abdominal pain chest pain shortness of breath or extremity pain. There are no exac/reliev factors. We are consulted for NIKKI.    Plan of Care:    Assessment:  Anselmo, baseline stage III CKD  Drug induced NIKKI/UTI  Volume depletion  HTN  Hypokalemia/Contraction alkalosis  Anemia of CKD    Plan:    - Renal function is improved with fluid challenge. D/C NS IVFs. No NSAIDs or IV contrast.   - Hold further doses of bactrim- getting IV CFTX.  - Hold lisinopril and torsemide.  - Given KCl repletion. Alkalosis is stable. Hold sodium bicarbonate.  - Hb is stable.     Thank you for allowing us to participate in this patient's care. We will continue to follow.    Vital Signs:  Temp Readings from Last 3 Encounters:   12/16/19 97.9 °F (36.6 °C) (Oral)   10/08/19 99.1 °F (37.3 °C) (Oral)   10/05/19 98 °F (36.7 °C) (Oral)       Pulse Readings from Last 3 Encounters:   12/16/19 88   10/08/19 64   10/05/19 66       BP  Readings from Last 3 Encounters:   12/16/19 107/70   10/08/19 (!) 114/56   10/05/19 (!) 132/57       Weight:  Wt Readings from Last 3 Encounters:   12/16/19 102.8 kg (226 lb 10.1 oz)   10/07/19 112.9 kg (248 lb 14.4 oz)   10/06/19 109.5 kg (241 lb 6.5 oz)       Past Medical & Surgical History:  Past Medical History:   Diagnosis Date    Alzheimer disease     Anemia     Atrial fibrillation     Cataract     CHF (congestive heart failure)     Chronic kidney disease     COPD (chronic obstructive pulmonary disease)     per daughter, wears O2 at 3 liter continous    Coronary artery disease     Dementia     Depression     Diabetes mellitus     GERD (gastroesophageal reflux disease)     Hyperlipidemia     Hypertension     Hypothyroidism     Osteoarthritis     Renal disorder     Spinal stenosis        Past Surgical History:   Procedure Laterality Date    CARDIAC SURGERY      pci x 8    CHOLECYSTECTOMY      EYE SURGERY      hx cataract sugery that didn't work per daughter    HYSTERECTOMY      per daughter    LEFT HEART CATHETERIZATION Left 10/6/2019    Procedure: Left heart cath;  Surgeon: Maikel Maradiaga MD;  Location: [facility] CATH/EP LAB;  Service: Cardiology;  Laterality: Left;       Past Social History:  Social History     Socioeconomic History    Marital status:      Spouse name: Not on file    Number of children: Not on file    Years of education: Not on file    Highest education level: Not on file   Occupational History    Not on file   Social Needs    Financial resource strain: Not on file    Food insecurity:     Worry: Not on file     Inability: Not on file    Transportation needs:     Medical: Not on file     Non-medical: Not on file   Tobacco Use    Smoking status: Former Smoker    Tobacco comment: Quit in 1982 per daughter   Substance and Sexual Activity    Alcohol use: Never     Frequency: Never    Drug use: Never    Sexual activity: Not Currently   Lifestyle     Physical activity:     Days per week: Not on file     Minutes per session: Not on file    Stress: Not on file   Relationships    Social connections:     Talks on phone: Not on file     Gets together: Not on file     Attends Faith service: Not on file     Active member of club or organization: Not on file     Attends meetings of clubs or organizations: Not on file     Relationship status: Not on file   Other Topics Concern    Not on file   Social History Narrative    Not on file       Medications:  No current facility-administered medications on file prior to encounter.      Current Outpatient Medications on File Prior to Encounter   Medication Sig Dispense Refill    albuterol (PROVENTIL) 2.5 mg /3 mL (0.083 %) nebulizer solution Inhale 1 vial into the lungs every 6 (six) hours as needed.       amino acids/protein hydrolys (PRO-STAT SUGAR FREE ORAL) Take 30 mLs by mouth once daily.      aspirin (ECOTRIN) 81 MG EC tablet Take 1 tablet (81 mg total) by mouth once daily. 30 tablet 2    atorvastatin (LIPITOR) 20 MG tablet Take 20 mg by mouth every evening.       bacitracin-polymyxin b (POLYSPORIN) ophthalmic ointment Place 1 application into both eyes every evening. APPLY A TINY STRIP TO FINGERTIP THEN RUB ALONG EYELASH MARGIN OF BOTH EYES      calcium carbonate/vitamin D3 (CALTRATE WITH VITAMIN D3 ORAL) Take 1 tablet by mouth once daily.       clopidogrel (PLAVIX) 75 mg tablet Take 1 tablet (75 mg total) by mouth once daily. 30 tablet 2    coenzyme Q10 (COQ-10) 100 mg capsule Take 200 mg by mouth once daily.      cyanocobalamin 1,000 mcg/mL injection Inject 1,000 mcg into the muscle every 30 days.       donepezil (ARICEPT) 10 MG tablet Take 10 mg by mouth every evening.      escitalopram oxalate (LEXAPRO) 20 MG tablet Take 20 mg by mouth every evening.       fluticasone propionate (FLONASE) 50 mcg/actuation nasal spray 1 spray by Each Nostril route daily as needed for Rhinitis.       fluticasone-umeclidin-vilanter (TRELEGY ELLIPTA) 100-62.5-25 mcg DsDv Inhale 1 puff into the lungs once daily.      guaifenesin 100 mg/5 ml (ROBITUSSIN) 100 mg/5 mL syrup Take 200 mg by mouth every 4 (four) hours as needed for Cough.       levalbuterol (XOPENEX HFA) 45 mcg/actuation inhaler Inhale 2 puffs into the lungs 2 (two) times daily. Rescue      levothyroxine (SYNTHROID) 125 MCG tablet Take 125 mcg by mouth before breakfast.       lisinopril (PRINIVIL,ZESTRIL) 2.5 MG tablet Take 1 tablet (2.5 mg total) by mouth once daily. 30 tablet 2    loratadine (CLARITIN) 10 mg tablet Take 10 mg by mouth daily as needed for Allergies.       memantine (NAMENDA) 5 MG Tab Take 5 mg by mouth 2 (two) times daily.       multivitamin Tab Take 1 tablet by mouth once daily.      polyvinyl alcohol-povidon,PF, (REFRESH CLASSIC, PF,) 1.4-0.6 % Dpet Place 1 drop into both eyes 4 (four) times daily.      potassium chloride (KLOR-CON M20 ORAL) Take 20 mEq by mouth once daily.       SITagliptin (JANUVIA) 50 MG Tab Take 100 mg by mouth once daily.      sodium bicarbonate 650 MG tablet Take 650 mg by mouth 3 (three) times daily.      sotalol (BETAPACE) 80 MG tablet Take 40 mg by mouth 2 (two) times daily.      torsemide (DEMADEX) 20 MG Tab Take 30 mg by mouth once daily.        Scheduled Meds:   aspirin  81 mg Oral Daily    cefTRIAXone (ROCEPHIN) IVPB  1 g Intravenous Q24H    clopidogrel  75 mg Oral Daily    donepezil  10 mg Oral QHS    enoxaparin  40 mg Subcutaneous Daily    levothyroxine  125 mcg Oral Before breakfast    memantine  5 mg Oral BID    sotalol  40 mg Oral Daily     Continuous Infusions:   sodium chloride 0.9% 100 mL/hr at 12/16/19 0518     PRN Meds:.acetaminophen, benzonatate, dextrose 50%, dextrose 50%, glucagon (human recombinant), glucose, glucose, insulin aspart U-100, magnesium oxide, magnesium sulfate IVPB, magnesium sulfate IVPB, magnesium sulfate IVPB, magnesium sulfate IVPB, ondansetron,  "potassium chloride in water, potassium chloride in water, potassium chloride in water, potassium chloride in water, potassium chloride, potassium chloride, potassium chloride, potassium chloride, sodium chloride 0.9%    Allergies:  Patient has no known allergies.    Past Family History:  Reviewed; refer to Hospitalist Admission Note    Review of Systems:  Review of Systems - All 14 systems reviewed and negative, except as noted in HPI    Physical Exam:  /70   Pulse 88   Temp 97.9 °F (36.6 °C) (Oral)   Resp 18   Ht 5' 5" (1.651 m)   Wt 102.8 kg (226 lb 10.1 oz)   SpO2 (!) 94%   Breastfeeding? No   BMI 37.71 kg/m²     INS/OUTS:  I/O last 3 completed shifts:  In: 2060 [P.O.:350; I.V.:1710]  Out: 3400 [Urine:3400]  No intake/output data recorded.    General Appearance:    NAD, AAO x 3, cooperative, appears stated age   Head:    Normocephalic, atraumatic   Eyes:    PER, EOMI, and conjunctiva/sclera clear bilaterally        Throat:   Moist mucus membranes, no thrush or oral lesions, normal      dentition   Back:     No CVA tenderness   Lungs:     Clear to auscultation bilaterally, no wheeze, crackles, rales      or rhonchi, symmetric air movement, respirations unlabored   Heart:    Regular rate and rhythm, S1 and S2 normal, no murmur, rub   or gallop   Abdomen:     Soft, non-tender, non-distended, bowel sounds active all four   quadrants, no RT or guarding, no masses, no organomegaly   Extremities:   Warm and well perfused, distal pulses intact, no cyanosis or    peripheral edema   MSK:   No joint or muscle swelling, tenderness or deformity   Skin:   Skin color, texture, turgor normal, no rashes or lesions   Neurologic/Psychiatric:   CNII-XII intact, normal strength and sensation       throughout, no asterixis; normal affect, memory, judgement     and insight     Results:  Lab Results   Component Value Date     (L) 12/16/2019    K 3.4 (L) 12/16/2019     12/16/2019    CO2 29 12/16/2019    BUN 20 " 12/16/2019    CREATININE 1.5 (H) 12/16/2019    CALCIUM 8.5 (L) 12/16/2019    ANIONGAP 5 (L) 12/16/2019    ESTGFRAFRICA 38.2 (A) 12/16/2019    EGFRNONAA 33.1 (A) 12/16/2019       Lab Results   Component Value Date    CALCIUM 8.5 (L) 12/16/2019       No results for input(s): WBC, RBC, HGB, HCT, PLT, MCV, MCH, MCHC in the last 24 hours.    I have personally reviewed pertinent radiological imaging and reports.    I have spent > 70 minutes providing care for this patient for the above diagnoses. These services have included chart/data/imaging review, evaluation, exam, formulation of plan, , note preparation, and discussions with staff involved in this patient's care.    Selvin Pratt MD MPH  [facility]  [address] [place] [id number]  [phone] (p)  [phone] (f)

## 2019-12-16 NOTE — PT/OT/SLP PROGRESS
Physical Therapy Treatment    Patient Name:  Katy Melchor   MRN:  [id number]    Recommendations:     Discharge Recommendations:  (back to nursing [facility] ([place]))   Discharge Equipment Recommendations: none   Barriers to discharge: None    Assessment:     Katy Melchor is a 79 y.o. female admitted with a medical diagnosis of Acute-on-chronic kidney injury.  She presents with the following impairments/functional limitations:  weakness, impaired endurance, impaired self care skills, gait instability, impaired functional mobilty, impaired cognition. Pt with 2 minor LOB while turning during ambulation. Pt ambulates with knees in flexion. Pt with decreased marlen 2/2 decreased step length of BLe. Pt completed gait training on 2L o2 with sats of 94%.  Continue with PT and POC.     Rehab Prognosis: Fair; patient would benefit from acute skilled PT services to address these deficits and reach maximum level of function.    Recent Surgery: * No surgery found *      Plan:     During this hospitalization, patient to be seen daily to address the identified rehab impairments via gait training, therapeutic activities, therapeutic exercises and progress toward the following goals:    · Plan of Care Expires:  01/14/20    Subjective     Chief Complaint: difficulty getting out of bed.   Patient/Family Comments/goals: return home   Pain/Comfort:  · Pain Rating 1: 0/10      Objective:     Communicated with RN prior to session.  Patient found HOB elevated with bed alarm, telemetry, peripheral IV upon PT entry to room.     General Precautions: Standard, fall   Orthopedic Precautions:N/A   Braces:       Functional Mobility:  · Bed Mobility:     · Supine to Sit: maximal assistance  · Transfers:     · Sit to Stand:  moderate assistance and of 2 persons with rolling walker  · Bed to WC: minimum assistance with  rolling walker  using  Step Transfer  · Gait: 100 feet with RW and min A       AM-PAC 6 CLICK MOBILITY          Therapeutic Activities  and Exercises:   bed mobility; sitting EOB for trunk control and midline orientation; sit<> stands; transfer training; gait training     Patient left sitting in WC with all lines intact and transporter present to bring Pt to xray.    GOALS:   Multidisciplinary Problems     Physical Therapy Goals        Problem: Physical Therapy Goal    Goal Priority Disciplines Outcome Goal Variances Interventions   Physical Therapy Goal     PT, PT/OT Ongoing, Progressing     Description:  Goals to be met by: discharge      Patient will increase functional independence with mobility by performing:     . Supine to sit with Stand-by Assistance. Established 12/15- Max A  . Sit to supine with Stand-by Assistance. Established 12/15 NT  . Sit to stand transfer with Stand-by Assistance. Established 12/15- Min A RW  . Bed to chair transfer with Supervision using Rolling Walker. Established 12/15-  mod A stand  pivot  . Gait  x 50  feet with Supervision using Rolling Walker. . Established 12/15- 50 ft RW min A                          Time Tracking:     PT Received On: 12/16/19  PT Start Time: 1009     PT Stop Time: 1032  PT Total Time (min): 23 min     Billable Minutes: Gait Training 10 and Therapeutic Activity 13    Treatment Type: Treatment  PT/PTA: PTA     PTA Visit Number: 1     Hillary Hammant, MARQUISE  12/16/2019

## 2019-12-16 NOTE — ASSESSMENT & PLAN NOTE
Likely chronic however she has not had any scans of the neck.  Will check x-ray of the cervical spine.  Voltaren gel to affected area

## 2019-12-16 NOTE — CARE UPDATE
12/15/19 2046   Patient Assessment/Suction   Level of Consciousness (AVPU) alert   All Lung Fields Breath Sounds diminished   PRE-TX-O2   O2 Device (Oxygen Therapy) nasal cannula   $ Is the patient on Low Flow Oxygen? Yes   Flow (L/min) 2   SpO2 95 %   Pulse Oximetry Type Intermittent   $ Pulse Oximetry - Multiple Charge Pulse Oximetry - Multiple   Pulse 103   Resp 18   Respiratory Evaluation   $ Care Plan Tech Time 15 min   Evaluation For New Orders

## 2019-12-16 NOTE — PLAN OF CARE
Problem: Skin Injury Risk Increased  Goal: Skin Health and Integrity  Outcome: Ongoing, Progressing     Problem: Oral Intake Inadequate  Goal: Improved Oral Intake  Outcome: Ongoing, Progressing  Intervention: Promote and Optimize Oral Intake  Flowsheets (Taken 12/16/2019 2799)  Oral Nutrition Promotion: calorie dense liquids provided       Recommendation/Intervention:   1.) Continue PO diet as tolerated by pt   2.) RD to add Glucerna TID (660 kcal, 30 g pro) to aid PO intake    Goals: 1.) Pt to meet >75% EEN via PO diet/supplements

## 2019-12-16 NOTE — PROGRESS NOTES
"[facility]  Adult Nutrition   Progress Note (Initial Assessment)     SUMMARY     Recommendations  Recommendation/Intervention: 1.) Continue PO diet as tolerated by pt 2.) RD to add Glucerna TID (660 kcal, 30 g pro) to aid PO intake  Goals: 1.) Pt to meet >75% EEN via PO diet/supplements   Nutrition Goal Status: new  Communication of RD Recs: other (comment)(discussed with family at bedside)    Reason for Assessment    Reason For Assessment: other (see comments)(MST score)  Diagnosis: renal disease(acute on chronic kidney injury)  Relevant Medical History: CHF, CAD, DM, afib, CKD, NSTEMI, depression, dementia, hypothyroidism    Nutrition Risk Screen    Nutrition Risk Screen: no indicators present     MST Score: 3  Have you recently lost weight without trying?: Unsure  Weight loss score: 2  Have you been eating poorly because of a decreased appetite?: Yes  Appetite score: 1       Nutrition/Diet History    Spiritual, Cultural Beliefs, Bahai Practices, Values that Affect Care: yes  Food Allergies: NKFA  Factors Affecting Nutritional Intake: decreased appetite    Anthropometrics    Temp: 99.1 °F (37.3 °C)  Height Method: Stated  Height: 5' 5" (165.1 cm)  Height (inches): 65 in  Weight Method: Bed Scale  Weight: 102.8 kg (226 lb 10.1 oz)  Weight (lb): 226.64 lb  Ideal Body Weight (IBW), Female: 125 lb  % Ideal Body Weight, Female (lb): 181.31 lb  BMI (Calculated): 37.7  BMI Grade: 35 - 39.9 - obesity - grade II       Weight History:  Wt Readings from Last 10 Encounters:   12/16/19 102.8 kg (226 lb 10.1 oz)   10/07/19 112.9 kg (248 lb 14.4 oz)   10/06/19 109.5 kg (241 lb 6.5 oz)   10/05/19 108.7 kg (239 lb 9.6 oz)   10/05/19 108.7 kg (239 lb 10.2 oz)     RD interpretation of weight history: noted 22# wt loss over 2 mths--8.8% wt loss    Lab/Procedures/Meds: Pertinent Labs Reviewed  Clinical Chemistry:  Recent Labs   Lab 12/14/19  1927 12/15/19  0614   * 135*   K 3.6 3.1*   CL 90* 97   CO2 28 27 "   * 89   BUN 42* 37*   CREATININE 3.5* 2.8*   CALCIUM 8.9 8.1*   PROT 7.4  --    ALBUMIN 3.1*  --    BILITOT 1.3*  --    ALKPHOS 329*  --    *  --    *  --    ANIONGAP 12 11   ESTGFRAFRICA 13.7* 18.0*   EGFRNONAA 11.9* 15.6*   MG  --  1.8     CBC:   Recent Labs   Lab 12/15/19  0614   WBC 16.08*   RBC 3.67*   HGB 10.8*   HCT 33.0*      MCV 90   MCH 29.4   MCHC 32.7     Cardiac Profile:  Recent Labs   Lab 12/14/19  1927 12/15/19  0133 12/15/19  0614   BNP 95  --   --    TROPONINI 0.036 0.042* 0.030       Thyroid & Parathyroid:  Recent Labs   Lab 12/14/19 1927   TSH 0.460   FREET4 1.43     Vitamins:  Recent Labs   Lab 12/15/19  0614   [id number]       Medications: Pertinent Medications reviewed  Scheduled Meds:   aspirin  81 mg Oral Daily    cefTRIAXone (ROCEPHIN) IVPB  1 g Intravenous Q24H    clopidogrel  75 mg Oral Daily    donepezil  10 mg Oral QHS    enoxaparin  30 mg Subcutaneous Daily    levothyroxine  125 mcg Oral Before breakfast    memantine  5 mg Oral BID    sotalol  40 mg Oral Daily     Continuous Infusions:   sodium chloride 0.9% 100 mL/hr at 12/16/19 0518     PRN Meds:.acetaminophen, benzonatate, dextrose 50%, dextrose 50%, glucagon (human recombinant), glucose, glucose, insulin aspart U-100, magnesium oxide, magnesium sulfate IVPB, magnesium sulfate IVPB, magnesium sulfate IVPB, magnesium sulfate IVPB, ondansetron, potassium chloride in water, potassium chloride in water, potassium chloride in water, potassium chloride in water, potassium chloride, potassium chloride, potassium chloride, potassium chloride, sodium chloride 0.9%    Estimated/Assessed Needs    Weight Used For Calorie Calculations: 102.8 kg (226 lb 10.1 oz)  Energy Calorie Requirements (kcal): 6605-5166 (20-25 kcal/kg)  Energy Need Method: Kcal/kg  Protein Requirements: 61-82 g (0.6-0.8 g/kg)  Weight Used For Protein Calculations: 102.8 kg (226 lb 10.1 oz)  Fluid Requirements (mL): per MD  Estimated  Fluid Requirement Method: other (see comments)  RDA Method (mL): 2056       Nutrition Prescription Ordered    Current Diet Order: 2000 ADA    Evaluation of Received Nutrient/Fluid Intake    IV Fluid (mL): 2400  Energy Calories Required: not meeting needs  Protein Required: not meeting needs  Fluid Required: meeting needs  Tolerance: tolerating     Intake/Output Summary (Last 24 hours) at 12/16/2019 0953  Last data filed at 12/16/2019 0600  Gross per 24 hour   Intake 350 ml   Output 2500 ml   Net -2150 ml        % Meal Intake: 25 - 50 %    Dietitian Rounds Brief    Pt assessed 2' MST score. Pt asleep at time of rounds; nutrition care discussed with daughter at bedside. Reports decreased intake over past week, previously eating well. No N/V. LBM 12/13. Pt requiring total assist with meals 2' weakness. Tolerating diet texture without difficulty. Agreeable to oral supplement to aid PO intake.    Nutrition Risk    Level of Risk/Frequency of Follow-up: high       Monitor and Evaluation    Food and Nutrient Intake: food and beverage intake, energy intake  Food and Nutrient Adminstration: diet order  Physical Activity and Function: nutrition-related ADLs and IADLs  Anthropometric Measurements: weight change, weight  Biochemical Data, Medical Tests and Procedures: electrolyte and renal panel, gastrointestinal profile, glucose/endocrine profile  Nutrition-Focused Physical Findings: overall appearance     Nutrition Follow-Up    RD Follow-up?: Yes    Venus Roach RD 12/16/2019 9:53 AM

## 2019-12-16 NOTE — ASSESSMENT & PLAN NOTE
Continue Iv ceftriaxone daily  Follow urine culture, however since patient was already partially treated for the UTI I am not optimistic that urine culture will grow anything however will do 3 days of IV antibiotics

## 2019-12-16 NOTE — PLAN OF CARE
Problem: Physical Therapy Goal  Goal: Physical Therapy Goal  Description  Goals to be met by: discharge      Patient will increase functional independence with mobility by performing:     . Supine to sit with Stand-by Assistance. Established 12/15- Max A  . Sit to supine with Stand-by Assistance. Established 12/15 NT   . Sit to stand transfer with Stand-by Assistance. Established 12/15- Min A RW  . Bed to chair transfer with Supervision using Rolling Walker. Established 12/15-  mod A stand  pivot  . Gait  x 50  feet with Supervision using Rolling Walker. . Established 12/15- 50 ft RW min A         Outcome: Ongoing, Progressing   Pt progressing toward meeting goals

## 2019-12-16 NOTE — PROGRESS NOTES
Pharmacist Renal Dose Adjustment Note    Katy Melchor is a 79 y.o. female being treated with the medication enoxaparin    Patient Data:    Vital Signs (Most Recent):  Temp: 97.9 °F (36.6 °C) (12/16/19 1100)  Pulse: 88 (12/16/19 1100)  Resp: 18 (12/16/19 1100)  BP: 107/70 (12/16/19 1100)  SpO2: (!) 94 % (12/16/19 1100)   Vital Signs (72h Range):  Temp:  [97.9 °F (36.6 °C)-100.7 °F (38.2 °C)]   Pulse:  []   Resp:  [17-24]   BP: ()/(48-70)   SpO2:  [91 %-97 %]      Recent Labs   Lab 12/14/19  1927 12/15/19  0614 12/16/19  1111   CREATININE 3.5* 2.8* 1.5*     Serum creatinine: 1.5 mg/dL (H) 12/16/19 1111  Estimated creatinine clearance: 36.7 mL/min (A)    Medication:enoxaparin dose: 30 mg frequency Q 24 hours will be changed to medication:enoxaparin dose:40 mg frequency:Q 24 hours for increased CrCl > 30    Pharmacist's Name: Nida Aguilar  Pharmacist's Extension: [phone]

## 2019-12-16 NOTE — PROGRESS NOTES
[facility] Medicine  Progress Note    Patient Name: Katy Melchor  MRN: [id number]  Patient Class: OP- Observation   Admission Date: 12/14/2019  Length of Stay: 0 days  Attending Physician: Freida Kang MD  Primary Care Provider: Julius Nguyen MD        Subjective:     Principal Problem:Acute-on-chronic kidney injury        HPI:  Patient is a 79-year-old female with past medical history of CHF, CAD (recent NSTEMI), paroxysmal atrial fibrillation, dementia, diabetes, high cholesterol, hypertension, depression, hypothyroidism and CKD presents to the ER from [facility] for generalized weakness. The Pt's daughters who is at bedside states that patient is having increasing difficulties ambulating with her walker and has had frequent falls including today..  Per the daughter's the patient fell  today and hit  her head.   Daughter states that patient is acting her baseline.  Patient denies loss of consciousness.The daughters state that the pt has also had a cough for approx 3 weeks that is nonproductive and is currently being treated for a UTI with Bactrim.   Patient denies fever chills abdominal pain chest pain shortness of breath or extremity pain.  Patient also denies headache    Overview/Hospital Course:  No notes on file    Interval History:  She complains of neck pain this morning.  Daughter is at bedside and sees that this is not new however has not been addressed.  She is on IV ceftriaxone for UTI.    Review of Systems   Constitutional: Negative for chills and fever.   HENT: Negative for congestion.    Respiratory: Negative for shortness of breath and wheezing.    Cardiovascular: Negative for chest pain and palpitations.   Gastrointestinal: Negative for constipation, diarrhea, nausea and vomiting.   Genitourinary: Negative for dysuria, flank pain, urgency and vaginal discharge.   Musculoskeletal: Positive for neck pain. Negative for back pain and myalgias.     Objective:      Vital Signs (Most Recent):  Temp: 99.1 °F (37.3 °C) (12/16/19 0817)  Pulse: 104 (12/16/19 0817)  Resp: 19 (12/16/19 0817)  BP: 113/66 (12/16/19 0817)  SpO2: 96 % (12/16/19 0817) Vital Signs (24h Range):  Temp:  [98.5 °F (36.9 °C)-100.7 °F (38.2 °C)] 99.1 °F (37.3 °C)  Pulse:  [] 104  Resp:  [17-20] 19  SpO2:  [91 %-96 %] 96 %  BP: (105-118)/(48-68) 113/66     Weight: 102.8 kg (226 lb 10.1 oz)  Body mass index is 37.71 kg/m².    Intake/Output Summary (Last 24 hours) at 12/16/2019 1009  Last data filed at 12/16/2019 0600  Gross per 24 hour   Intake 350 ml   Output 2500 ml   Net -2150 ml      Physical Exam   Constitutional: She is oriented to person, place, and time. No distress.   Eyes: Pupils are equal, round, and reactive to light. No scleral icterus.   Neck: Normal range of motion. Neck supple.   Cardiovascular: Normal rate and regular rhythm.   No murmur heard.  Pulmonary/Chest: Breath sounds normal. She has no wheezes. She has no rales.   Abdominal: Soft. She exhibits no distension. There is no tenderness.   Genitourinary:   Genitourinary Comments: Dai catheter in place.   Neurological: She is alert and oriented to person, place, and time.   Skin: Skin is warm. Capillary refill takes less than 2 seconds. No rash noted.   Psychiatric: She has a normal mood and affect.   Nursing note and vitals reviewed.      Significant Labs: All pertinent labs within the past 24 hours have been reviewed.    Significant Imaging: none      Assessment/Plan:      * Acute-on-chronic kidney injury  Likely secondary to combination of pre-renal, diuretics and bactrim.  No BMP was drawn this morning.  Order stat BMP    UTI (urinary tract infection)  Continue Iv ceftriaxone daily  Follow urine culture, however since patient was already partially treated for the UTI I am not optimistic that urine culture will grow anything however will do 3 days of IV antibiotics      Neck pain  Likely chronic however she has not had any scans  of the neck.  Will check x-ray of the cervical spine.  Voltaren gel to affected area      Hypokalemia  Repeat potassium levels this morning.      Generalized weakness  Probably multifactorial.  PT evaluation      Diabetes mellitus, type 2  On SSI, ADA diet      Coronary disease  S/p NSTEMi s/p stent  Continue aspirin, plavix, and statin    Dementia  Patient with history of dementia  Will resume home meds      Hyponatremia  Will start IV fluids  Follow serial labs  D/C diuretic        VTE Risk Mitigation (From admission, onward)         Ordered     enoxaparin injection 30 mg  Daily      12/14/19 2112     IP VTE HIGH RISK PATIENT  Once      12/14/19 2112                      Freida Kang MD  Department of Hospital Medicine   [facility]

## 2019-12-16 NOTE — PT/OT/SLP PROGRESS
Physical Therapy Treatment    Patient Name:  Katy Melchor   MRN:  [id number]    Recommendations:     Discharge Recommendations:  (back to nursing [facility] ([place]))   Discharge Equipment Recommendations: none   Barriers to discharge: None    Assessment:     Katy Melchor is a 79 y.o. female admitted with a medical diagnosis of Acute-on-chronic kidney injury.  She presents with the following impairments/functional limitations:  weakness, impaired endurance, impaired self care skills, impaired functional mobilty, impaired cognition. PTA present when Pt arrived from X ray and assisted Pt back to bed.     Rehab Prognosis: Fair; patient would benefit from acute skilled PT services to address these deficits and reach maximum level of function.    Recent Surgery: * No surgery found *      Plan:     During this hospitalization, patient to be seen daily to address the identified rehab impairments via gait training, therapeutic activities, therapeutic exercises and progress toward the following goals:    · Plan of Care Expires:  01/14/20    Subjective     Chief Complaint: general fatigue  Patient/Family Comments/goals: return home   Pain/Comfort:  · Pain Rating 1: 0/10      Objective:     Communicated with RN prior to session.  Patient found sitting in WC with transporter present with peripheral IV, telemetry, oxygen upon PT entry to room.     General Precautions: Standard, fall   Orthopedic Precautions:N/A   Braces:       Functional Mobility:  · Bed Mobility:     · Rolling Right: minimum assistance  · Scooting: moderate assistance  · Bridging: stand by assistance  · Sit to Supine: stand by assistance  · Transfers:     · Sit to Stand:  contact guard assistance with no AD  · Bed < WC: contact guard assistance with  no AD  using  Stand Pivot      AM-PAC 6 CLICK MOBILITY          Therapeutic Activities and Exercises:   bed mobility; sitting EOB for trunk control and midline orientation; sit<> stands; transfer training    Patient left  HOB elevated with all lines intact, call button in reach and bed alarm on..    GOALS:   Multidisciplinary Problems     Physical Therapy Goals        Problem: Physical Therapy Goal    Goal Priority Disciplines Outcome Goal Variances Interventions   Physical Therapy Goal     PT, PT/OT Ongoing, Progressing     Description:  Goals to be met by: discharge      Patient will increase functional independence with mobility by performing:     . Supine to sit with Stand-by Assistance. Established 12/15- Max A  . Sit to supine with Stand-by Assistance. Established 12/15 NT  . Sit to stand transfer with Stand-by Assistance. Established 12/15- Min A RW  . Bed to chair transfer with Supervision using Rolling Walker. Established 12/15-  mod A stand  pivot  . Gait  x 50  feet with Supervision using Rolling Walker. . Established 12/15- 50 ft RW min A                          Time Tracking:     PT Received On: 12/16/19  PT Start Time: 1054     PT Stop Time: 1109  PT Total Time (min): 15 min     Billable Minutes: Therapeutic Activity 15    Treatment Type: Treatment  PT/PTA: PTA     PTA Visit Number: 1     Hillary Hammant, PTA  12/16/2019

## 2019-12-16 NOTE — ASSESSMENT & PLAN NOTE
Likely secondary to combination of pre-renal, diuretics and bactrim.  No BMP was drawn this morning.  Order stat BMP

## 2019-12-16 NOTE — SUBJECTIVE & OBJECTIVE
Interval History:  She complains of neck pain this morning.  Daughter is at bedside and sees that this is not new however has not been addressed.  She is on IV ceftriaxone for UTI.    Review of Systems   Constitutional: Negative for chills and fever.   HENT: Negative for congestion.    Respiratory: Negative for shortness of breath and wheezing.    Cardiovascular: Negative for chest pain and palpitations.   Gastrointestinal: Negative for constipation, diarrhea, nausea and vomiting.   Genitourinary: Negative for dysuria, flank pain, urgency and vaginal discharge.   Musculoskeletal: Positive for neck pain. Negative for back pain and myalgias.     Objective:     Vital Signs (Most Recent):  Temp: 99.1 °F (37.3 °C) (12/16/19 0817)  Pulse: 104 (12/16/19 0817)  Resp: 19 (12/16/19 0817)  BP: 113/66 (12/16/19 0817)  SpO2: 96 % (12/16/19 0817) Vital Signs (24h Range):  Temp:  [98.5 °F (36.9 °C)-100.7 °F (38.2 °C)] 99.1 °F (37.3 °C)  Pulse:  [] 104  Resp:  [17-20] 19  SpO2:  [91 %-96 %] 96 %  BP: (105-118)/(48-68) 113/66     Weight: 102.8 kg (226 lb 10.1 oz)  Body mass index is 37.71 kg/m².    Intake/Output Summary (Last 24 hours) at 12/16/2019 1009  Last data filed at 12/16/2019 0600  Gross per 24 hour   Intake 350 ml   Output 2500 ml   Net -2150 ml      Physical Exam   Constitutional: She is oriented to person, place, and time. No distress.   Eyes: Pupils are equal, round, and reactive to light. No scleral icterus.   Neck: Normal range of motion. Neck supple.   Cardiovascular: Normal rate and regular rhythm.   No murmur heard.  Pulmonary/Chest: Breath sounds normal. She has no wheezes. She has no rales.   Abdominal: Soft. She exhibits no distension. There is no tenderness.   Genitourinary:   Genitourinary Comments: Dai catheter in place.   Neurological: She is alert and oriented to person, place, and time.   Skin: Skin is warm. Capillary refill takes less than 2 seconds. No rash noted.   Psychiatric: She has a normal  mood and affect.   Nursing note and vitals reviewed.      Significant Labs: All pertinent labs within the past 24 hours have been reviewed.    Significant Imaging: none

## 2019-12-16 NOTE — PT/OT/SLP EVAL
"Occupational Therapy   Evaluation    Name: Katy Melchor  MRN: [id number]  Admitting Diagnosis:  Acute-on-chronic kidney injury      Recommendations:     Discharge Recommendations: (back to nursing home [place])  Discharge Equipment Recommendations:  none  Barriers to discharge:  None    Assessment:     Katy Melchor is a 79 y.o. female with a medical diagnosis of Acute-on-chronic kidney injury.  Pt agreeable to OT therapy session. Performance deficits affecting function: weakness, impaired endurance, impaired self care skills, impaired functional mobilty, impaired cognition, impaired balance, decreased safety awareness.  Pt oriented to person only. Daughter in and out of room during session. Rec return to NH at d/c.     Rehab Prognosis: Fair; patient would benefit from acute skilled OT services to address these deficits and reach maximum level of function.       Plan:     Patient to be seen 5 x/week to address the above listed problems via self-care/home management, therapeutic activities, therapeutic exercises  · Plan of Care Expires: 01/16/20  · Plan of Care Reviewed with: patient    Subjective     Chief Complaint: "Can I lay down now?"  Patient/Family Comments/goals: none stated    Occupational Profile:  Living Environment: Pt lives at [facility].  Previous level of function: Mod I - Min A  Equipment Used at Home:  walker, rolling  Assistance upon Discharge: yes, from staff at NH    Pain/Comfort:  · Pain Rating 1: 0/10    Patients cultural, spiritual, Buddhism conflicts given the current situation:      Objective:     Communicated with: nursing prior to session.  Patient found on BSC with nursing assisting patient with peripheral IV, telemetry, oxygen, good catheter and daughter present upon OT entry to room.    General Precautions: Standard, fall   Orthopedic Precautions:N/A   Braces: N/A     Occupational Performance:    Bed Mobility:    · Patient completed Scooting/Bridging with contact guard " assistance  · Patient completed Sit to Supine with minimum assistance    Functional Mobility/Transfers:  · Patient completed Toilet Transfer Stand Pivot technique with moderate assistance with  hand-held assist X 2    Activities of Daily Living:  · Grooming: minimum assistance to brush teeth seated EOB; Pt required cues for each step in the task  · Lower Body Dressing: moderate assistance to don/doff socks seated EOB  · Toileting: total assistance BSC    Cognitive/Visual Perceptual:  Cognitive/Psychosocial Skills:     -       Oriented to: Person   -       Follows Commands/attention:Follows one-step commands  -       Communication: clear/fluent  -       Memory: Deficits  -       Safety awareness/insight to disability: impaired   -       Mood/Affect/Coping skills/emotional control: Appropriate to situation, Cooperative and Pleasant    Physical Exam:  Balance:    -       good sitting balance; poor + standing balance  Dominant hand:    -       right handed  Upper Extremity Range of Motion:     -       Right Upper Extremity: WFL  -       Left Upper Extremity: WFL  Upper Extremity Strength:    -       Right Upper Extremity: Unable to formally assess 2/2 pt's cognition   -       Left Upper Extremity: Unable to formally assess 2/2 patient's cognition    Strength:    -       Right Upper Extremity: poor  -       Left Upper Extremity: poor  Fine Motor Coordination:    -       Intact  Gross motor coordination:   WFL    AMPAC 6 Click ADL:  AMPAC Total Score: 14    Treatment & Education:  Pt educated on role of OT/POC and safety during ADLs and during transfers.   Education:    Patient left HOB elevated with all lines intact, call button in reach and daughter present    GOALS:   Multidisciplinary Problems     Occupational Therapy Goals        Problem: Occupational Therapy Goal    Goal Priority Disciplines Outcome Interventions   Occupational Therapy Goal     OT, PT/OT     Description:  Goals to be met by: discharge    Patient  will increase functional independence with ADLs by performing:    LE Dressing with Contact Guard Assistance.  Grooming while seated with Stand-by Assistance.  Toileting from bedside commode with Moderate Assistance for hygiene and clothing management.   Toilet transfer to bedside commode with Contact Guard Assistance.                      History:     Past Medical History:   Diagnosis Date    Alzheimer disease     Anemia     Atrial fibrillation     Cataract     CHF (congestive heart failure)     Chronic kidney disease     COPD (chronic obstructive pulmonary disease)     per daughter, wears O2 at 3 liter continous    Coronary artery disease     Dementia     Depression     Diabetes mellitus     GERD (gastroesophageal reflux disease)     Hyperlipidemia     Hypertension     Hypothyroidism     Osteoarthritis     Renal disorder     Spinal stenosis        Past Surgical History:   Procedure Laterality Date    CARDIAC SURGERY      pci x 8    CHOLECYSTECTOMY      EYE SURGERY      hx cataract sugery that didn't work per daughter    HYSTERECTOMY      per daughter    LEFT HEART CATHETERIZATION Left 10/6/2019    Procedure: Left heart cath;  Surgeon: Maikel Maradiaga MD;  Location: [facility] CATH/EP LAB;  Service: Cardiology;  Laterality: Left;       Time Tracking:     OT Date of Treatment: 12/16/19  OT Start Time: 1332  OT Stop Time: 1354  OT Total Time (min): 22 min    Billable Minutes:Evaluation 10  Self Care/Home Management 12    Zoey Reilly OT  12/16/2019

## 2019-12-17 PROBLEM — E87.3 ALKALOSIS: Status: ACTIVE | Noted: 2019-12-17

## 2019-12-17 PROBLEM — E87.1 HYPONATREMIA: Status: RESOLVED | Noted: 2019-12-14 | Resolved: 2019-12-17

## 2019-12-17 PROBLEM — F32.A DEPRESSION: Chronic | Status: ACTIVE | Noted: 2019-12-17

## 2019-12-17 PROBLEM — N17.9 AKI (ACUTE KIDNEY INJURY): Status: ACTIVE | Noted: 2019-12-17

## 2019-12-17 LAB
ANION GAP SERPL CALC-SCNC: 7 MMOL/L (ref 8–16)
BASOPHILS # BLD AUTO: 0.04 K/UL (ref 0–0.2)
BASOPHILS NFR BLD: 0.3 % (ref 0–1.9)
BUN SERPL-MCNC: 20 MG/DL (ref 8–23)
CALCIUM SERPL-MCNC: 8.7 MG/DL (ref 8.7–10.5)
CHLORIDE SERPL-SCNC: 99 MMOL/L (ref 95–110)
CO2 SERPL-SCNC: 30 MMOL/L (ref 23–29)
CREAT SERPL-MCNC: 1.2 MG/DL (ref 0.5–1.4)
DIFFERENTIAL METHOD: ABNORMAL
EOSINOPHIL # BLD AUTO: 0.4 K/UL (ref 0–0.5)
EOSINOPHIL NFR BLD: 3.3 % (ref 0–8)
ERYTHROCYTE [DISTWIDTH] IN BLOOD BY AUTOMATED COUNT: 14.5 % (ref 11.5–14.5)
EST. GFR  (AFRICAN AMERICAN): 50 ML/MIN/1.73 M^2
EST. GFR  (NON AFRICAN AMERICAN): 43.4 ML/MIN/1.73 M^2
GLUCOSE SERPL-MCNC: 107 MG/DL (ref 70–110)
GLUCOSE SERPL-MCNC: 112 MG/DL (ref 70–110)
GLUCOSE SERPL-MCNC: 119 MG/DL (ref 70–110)
GLUCOSE SERPL-MCNC: 87 MG/DL (ref 70–110)
GLUCOSE SERPL-MCNC: 93 MG/DL (ref 70–110)
HCT VFR BLD AUTO: 33.9 % (ref 37–48.5)
HGB BLD-MCNC: 10.8 G/DL (ref 12–16)
IMM GRANULOCYTES # BLD AUTO: 0.14 K/UL (ref 0–0.04)
IMM GRANULOCYTES NFR BLD AUTO: 1.2 % (ref 0–0.5)
LYMPHOCYTES # BLD AUTO: 1.8 K/UL (ref 1–4.8)
LYMPHOCYTES NFR BLD: 15.6 % (ref 18–48)
MCH RBC QN AUTO: 28.8 PG (ref 27–31)
MCHC RBC AUTO-ENTMCNC: 31.9 G/DL (ref 32–36)
MCV RBC AUTO: 90 FL (ref 82–98)
MONOCYTES # BLD AUTO: 0.6 K/UL (ref 0.3–1)
MONOCYTES NFR BLD: 5.5 % (ref 4–15)
NEUTROPHILS # BLD AUTO: 8.6 K/UL (ref 1.8–7.7)
NEUTROPHILS NFR BLD: 74.1 % (ref 38–73)
NRBC BLD-RTO: 0 /100 WBC
PLATELET # BLD AUTO: 234 K/UL (ref 150–350)
PMV BLD AUTO: 10.1 FL (ref 9.2–12.9)
POTASSIUM SERPL-SCNC: 3.3 MMOL/L (ref 3.5–5.1)
RBC # BLD AUTO: 3.75 M/UL (ref 4–5.4)
SODIUM SERPL-SCNC: 136 MMOL/L (ref 136–145)
WBC # BLD AUTO: 11.55 K/UL (ref 3.9–12.7)

## 2019-12-17 PROCEDURE — 97535 SELF CARE MNGMENT TRAINING: CPT

## 2019-12-17 PROCEDURE — 27000221 HC OXYGEN, UP TO 24 HOURS

## 2019-12-17 PROCEDURE — 97116 GAIT TRAINING THERAPY: CPT

## 2019-12-17 PROCEDURE — 82962 GLUCOSE BLOOD TEST: CPT

## 2019-12-17 PROCEDURE — 25000003 PHARM REV CODE 250: Performed by: INTERNAL MEDICINE

## 2019-12-17 PROCEDURE — 80048 BASIC METABOLIC PNL TOTAL CA: CPT

## 2019-12-17 PROCEDURE — 94761 N-INVAS EAR/PLS OXIMETRY MLT: CPT

## 2019-12-17 PROCEDURE — 36415 COLL VENOUS BLD VENIPUNCTURE: CPT

## 2019-12-17 PROCEDURE — 63600175 PHARM REV CODE 636 W HCPCS: Performed by: INTERNAL MEDICINE

## 2019-12-17 PROCEDURE — 85025 COMPLETE CBC W/AUTO DIFF WBC: CPT

## 2019-12-17 PROCEDURE — 97530 THERAPEUTIC ACTIVITIES: CPT

## 2019-12-17 PROCEDURE — 63600175 PHARM REV CODE 636 W HCPCS: Performed by: HOSPITALIST

## 2019-12-17 PROCEDURE — 12000002 HC ACUTE/MED SURGE SEMI-PRIVATE ROOM

## 2019-12-17 PROCEDURE — 25000003 PHARM REV CODE 250: Performed by: HOSPITALIST

## 2019-12-17 RX ORDER — ATORVASTATIN CALCIUM 10 MG/1
10 TABLET, FILM COATED ORAL NIGHTLY
Status: DISCONTINUED | OUTPATIENT
Start: 2019-12-17 | End: 2019-12-17

## 2019-12-17 RX ORDER — ESCITALOPRAM OXALATE 10 MG/1
20 TABLET ORAL NIGHTLY
Status: DISCONTINUED | OUTPATIENT
Start: 2019-12-17 | End: 2019-12-18 | Stop reason: HOSPADM

## 2019-12-17 RX ORDER — ATORVASTATIN CALCIUM 20 MG/1
20 TABLET, FILM COATED ORAL NIGHTLY
Status: DISCONTINUED | OUTPATIENT
Start: 2019-12-17 | End: 2019-12-18 | Stop reason: HOSPADM

## 2019-12-17 RX ORDER — POTASSIUM CHLORIDE 20 MEQ/1
20 TABLET, EXTENDED RELEASE ORAL DAILY
Status: DISCONTINUED | OUTPATIENT
Start: 2019-12-17 | End: 2019-12-18 | Stop reason: HOSPADM

## 2019-12-17 RX ADMIN — MAGNESIUM SULFATE HEPTAHYDRATE 1 G: 1 INJECTION, SOLUTION INTRAVENOUS at 07:12

## 2019-12-17 RX ADMIN — POTASSIUM CHLORIDE 20 MEQ: 20 TABLET, EXTENDED RELEASE ORAL at 09:12

## 2019-12-17 RX ADMIN — MEMANTINE HYDROCHLORIDE 5 MG: 5 TABLET ORAL at 09:12

## 2019-12-17 RX ADMIN — ATORVASTATIN CALCIUM 20 MG: 20 TABLET, FILM COATED ORAL at 09:12

## 2019-12-17 RX ADMIN — ESCITALOPRAM OXALATE 20 MG: 10 TABLET ORAL at 09:12

## 2019-12-17 RX ADMIN — SOTALOL HYDROCHLORIDE 40 MG: 80 TABLET ORAL at 09:12

## 2019-12-17 RX ADMIN — CEFTRIAXONE 1 G: 1 INJECTION, SOLUTION INTRAVENOUS at 02:12

## 2019-12-17 RX ADMIN — ASPIRIN 81 MG: 81 TABLET, DELAYED RELEASE ORAL at 09:12

## 2019-12-17 RX ADMIN — ENOXAPARIN SODIUM 40 MG: 100 INJECTION SUBCUTANEOUS at 05:12

## 2019-12-17 RX ADMIN — LEVOTHYROXINE SODIUM 125 MCG: 25 TABLET ORAL at 05:12

## 2019-12-17 RX ADMIN — DONEPEZIL HYDROCHLORIDE 10 MG: 5 TABLET, FILM COATED ORAL at 09:12

## 2019-12-17 RX ADMIN — CLOPIDOGREL BISULFATE 75 MG: 75 TABLET, FILM COATED ORAL at 09:12

## 2019-12-17 NOTE — PLAN OF CARE
12/17/19 1004   Discharge Reassessment   Assessment Type Discharge Planning Reassessment   Discharge Plan A Return to nursing home     Pt is a resident of [facility] ([facility] called and spoke with Claudia at [phone] to confirm). Per Dr. Linton' latest note, pt is anticipated to d/c tomorrow. [facility] informed Claudia of possible d/c, and Claudia requested PNs be sent prior to d/c orders. [facility] faxed PNs via right fax. Claudia will check in with their  about transporting pt tomorrow. [facility] to continue to follow.

## 2019-12-17 NOTE — ASSESSMENT & PLAN NOTE
Admission labs with creatinine 3.5.  Today down trended to 1.3, at baseline.  Likely multifactorial secondary to dehydration with continue torsemide use, Bactrim and UTI.  Did receive IV fluid hydration which is now discontinued.   Continue to hold torsemide and lisinopril.  Discussed with daughter at some point will need to re-initiate torsemide.  Renally dose all medications and avoid nephrotoxin drugs.  Repeat BMP tomorrow- if stable can consider discharge tomorrow  Appreciate Nephrology input

## 2019-12-17 NOTE — ASSESSMENT & PLAN NOTE
Prescription eprescribed via computer as listed on medical record per protocol.     Urine culture 12/04 Citrobacter koseri.  She was on Bactrim prior to admission.  Now also completed 3 days of Rocephin.  Discontinue further antibiotics.  Continue to monitor clinically.

## 2019-12-17 NOTE — ASSESSMENT & PLAN NOTE
Admitted with hyponatremia with dehydration and acute kidney injury.  Continue to hold torsemide for now.  Also with contraction alkalosis.

## 2019-12-17 NOTE — SUBJECTIVE & OBJECTIVE
Interval History:  Patient seen with daughter and nursing present.  Continues with chronic shortness of breath associated with nonproductive coughing.  Daughters states she is on chronic oxygen therapy.  Dai has now been discontinued and has since spontaneously voided.  Had bowel movement yesterday.  Hopeful to sit out of bed today. T-max last 24 hr 99.3.  Blood pressure acceptable.  Reviewed admission workup.  CT head cephalohematoma right parieto-occipital region.  Chest x-ray clear.  C-spine degenerative changes most prominent at C5-C6, no acute.  Urine culture 12/04 growing citrobacter koseri. No AM labs were ordered and therefore ordered stat prior to encounter.     Review of Systems   Constitutional: Negative for chills and fever.   Respiratory: Positive for cough (non productive) and shortness of breath.    Cardiovascular: Negative for chest pain, palpitations and leg swelling.   Gastrointestinal: Positive for abdominal pain (generalized). Negative for constipation, diarrhea, nausea and vomiting.   Genitourinary: Negative for dysuria.   Musculoskeletal: Positive for neck pain (chronic).   Neurological: Positive for weakness (generalized).     Objective:     Vital Signs (Most Recent):  Temp: 99.3 °F (37.4 °C) (12/17/19 0755)  Pulse: 91 (12/17/19 0755)  Resp: 18 (12/17/19 0755)  BP: 102/66 (12/17/19 0755)  SpO2: 97 % (12/17/19 0755) Vital Signs (24h Range):  Temp:  [97.9 °F (36.6 °C)-99.3 °F (37.4 °C)] 99.3 °F (37.4 °C)  Pulse:  [86-98] 91  Resp:  [18-19] 18  SpO2:  [94 %-97 %] 97 %  BP: ()/(54-72) 102/66     Weight: 102.8 kg (226 lb 10.1 oz)  Body mass index is 37.71 kg/m².    Intake/Output Summary (Last 24 hours) at 12/17/2019 0916  Last data filed at 12/16/2019 2300  Gross per 24 hour   Intake --   Output 100 ml   Net -100 ml      Physical Exam   Constitutional: She is oriented to person, place, and time.   Elderly obese female patient lying in bed, no apparent distress, cooperative, pleasant   HENT:    Head: Normocephalic.   Moist mucous membrane   Eyes: Right eye exhibits no discharge. Left eye exhibits no discharge.   Neck: Neck supple.   Cardiovascular:   Slow heart rate, no significant lower extremity edema   Pulmonary/Chest: Effort normal. No stridor. No respiratory distress. She has no wheezes.   On supplemental oxygen via nasal cannula, reduced air entry at bases   Abdominal: There is tenderness. There is no rebound and no guarding.   Protuberant, mild tenderness most prominent right lower quadrant, no peritoneal signs, bowel sounds present   Genitourinary:   Genitourinary Comments: Dai catheter now removed.  No suprapubic fullness.   Musculoskeletal:   Osteoarthritis changes.   Neurological: She is alert and oriented to person, place, and time.   Able to lift bilateral lower extremity against gravity.  Speech is normal.   Skin: Skin is warm and dry.   Psychiatric: She has a normal mood and affect.   Nursing note and vitals reviewed.      Significant Labs:   BMP:   Recent Labs   Lab 12/17/19  0814         K 3.3*   CL 99   CO2 30*   BUN 20   CREATININE 1.2   CALCIUM 8.7     CBC:   Recent Labs   Lab 12/17/19  0814   WBC 11.55   HGB 10.8*   HCT 33.9*        CMP:   Recent Labs   Lab 12/16/19  1111 12/17/19  0814   * 136   K 3.4* 3.3*    99   CO2 29 30*   * 107   BUN 20 20   CREATININE 1.5* 1.2   CALCIUM 8.5* 8.7   ANIONGAP 5* 7*   EGFRNONAA 33.1* 43.4*     TSH:   Recent Labs   Lab 12/14/19  1927   TSH 0.460     Urine Culture: No results for input(s): LABURIN in the last 48 hours.  Urine Studies: No results for input(s): COLORU, APPEARANCEUA, PHUR, SPECGRAV, PROTEINUA, GLUCUA, KETONESU, BILIRUBINUA, OCCULTUA, NITRITE, UROBILINOGEN, LEUKOCYTESUR, RBCUA, WBCUA, BACTERIA, SQUAMEPITHEL, HYALINECASTS in the last 48 hours.    Invalid input(s): WRIGHTSUR  All pertinent labs within the past 24 hours have been reviewed.    Significant Imaging: CT: I have reviewed all pertinent  results/findings within the past 24 hours and my personal findings are:  Cephalohematoma right parieto-occipital region  CXR: I have reviewed all pertinent results/findings within the past 24 hours and my personal findings are:  No focal infiltrates or consolidation  I have reviewed all pertinent imaging results/findings within the past 24 hours.     X-ray Chest Pa And Lateral    Result Date: 12/14/2019  EXAMINATION: XR CHEST PA AND LATERAL CLINICAL HISTORY: Cough; TECHNIQUE: PA and lateral views of the chest were performed. COMPARISON: 10/05/2019 FINDINGS: The lungs are clear, with normal appearance of pulmonary vasculature and no pleural effusion or pneumothorax. The cardiac silhouette is normal in size. The hilar and mediastinal contours are unremarkable. Bones are intact.     No acute abnormality. Electronically signed by: Adonis Arguelles MD Date:    12/14/2019 Time:    18:45    X-ray Cervical Spine Complete 5 View    Result Date: 12/16/2019  EXAMINATION: XR CERVICAL SPINE COMPLETE 5 VIEW CLINICAL HISTORY: neck pain; FINDINGS: Five views of the cervical spine The cervical spine is in satisfactory alignment.  The vertebral bodies are of normal height.  There is disc space narrowing at C5-6.  Facet joints are aligned.  The odontoid process is intact and lateral masses of C1 are symmetrical.  The prevertebral soft tissues are normal.     Mild degenerative changes at C5-6 with no acute osseous abnormality Electronically signed by: Corina Jeong MD Date:    12/16/2019 Time:    11:20    Ct Head Without Contrast    Result Date: 12/14/2019  EXAMINATION: CT HEAD WITHOUT CONTRAST CLINICAL HISTORY: Confusion/delirium, altered LOC, unexplained; TECHNIQUE: Low dose axial images were obtained through the head.  Coronal and sagittal reformations were also performed. Contrast was not administered. COMPARISON: None. FINDINGS: Crescentic high density focus marginates the outer cortical table spanning the right parietooccipital  zone extending cephalad secondary rather large cephalohematoma formation.  There is evidence of generalized cortical and central involutional changes that appear age-appropriate as manifested by deepening the cortical sulci and widening the cranial fissures.  The superimposed low-density areas are established throughout the bilateral Diencephalic hemispheres secondary to evolving deep white matter ischemia.  No evidence of intraparenchymal hemorrhage or mass lesion or acute cortical infarct.  Ventricles are prominent in size congruent with degree of cerebral atrophy.  No significant subdural or epidural collections.  Orbits retrobulbar compartments, pituitary gland, and pituitary stock are normal.     1. No evidence of acute intracranial findings. 2. Generalized cortical and central involutional changes with evidence of background deep white matter ischemic changes. 3. Prominent cephalohematoma formation projecting over the right parietooccipital convexity extending cephalad perhaps related to recent traumatic episode. Electronically signed by: Adonis Arguelles MD Date:    12/14/2019 Time:    19:15  ECG Results    None

## 2019-12-17 NOTE — PHYSICIAN QUERY
PT Name: Katy Melchor  MR #: [id number]    Physician Query Form - Atrial Fibrillation Specificity     CDS/: Leyla Torres RN, CCDS               Contact information:  [phone]     This form is a permanent document in the medical record.     Query Date: December 17, 2019    By submitting this query, we are merely seeking further clarification of documentation. Please utilize your independent clinical judgment when addressing the question(s) below.    The medical record contains the following:   Indicators     Supporting Clinical Findings Location in Medical Record   X Atrial Fibrillation past medical history of CHF, CAD (recent NSTEMI), paroxysmal atrial fibrillation    ?  Proximal AFib -not clear     Chronic a-fib 12/14/19  [facility]      12/14/19  [facility]    12/17/19 Hospital Medicine note    EKG results     X Medication On sotalol. 12/17/19 Hospital Medicine note    Treatment     X Other Remote telemetry monitoring.   12/17/19 Hospital Medicine note       Provider, please further specify the Atrial Fibrillation diagnosis.  Please clarify type of a-fib.    [  ] Chronic   [  X] Paroxysmal   [  ] Other (please specify):   [  ] Clinically Undetermined       Please document in your progress notes daily for the duration of treatment until resolved, and include in your discharge summary.

## 2019-12-17 NOTE — ASSESSMENT & PLAN NOTE
Hold Januvia.  Insulin sliding scale t.i.d. with meals.  Accu-Cheks.  As needed hypoglycemic measures.

## 2019-12-17 NOTE — PT/OT/SLP PROGRESS
Physical Therapy Treatment    Patient Name:  Katy Melchor   MRN:  [id number]    Recommendations:     Discharge Recommendations:  (back to nursing [place] ([place]   Discharge Equipment Recommendations: none   Barriers to discharge: None    Assessment:     Katy Melchor is a 79 y.o. female admitted with a medical diagnosis of Acute-on-chronic kidney injury.  She presents with the following impairments/functional limitations:  weakness, impaired endurance, impaired self care skills, impaired functional mobilty, decreased safety awareness, decreased lower extremity function. Pt with reports of increased fatigue upon arrival.  Pt required 3L o2 during ambulation to maintain sats at or above 89%. Pt without LOB during gait training. Pt with decreased marlen 2/2 decreased step length of BLE.     Rehab Prognosis: Fair; patient would benefit from acute skilled PT services to address these deficits and reach maximum level of function.    Recent Surgery: * No surgery found *      Plan:     During this hospitalization, patient to be seen daily to address the identified rehab impairments via gait training, therapeutic activities, therapeutic exercises and progress toward the following goals:    · Plan of Care Expires:  01/14/20    Subjective     Chief Complaint: increased fatigue today.  Patient/Family Comments/goals: return home   Pain/Comfort:  · Pain Rating 1: 0/10  · Pain Rating Post-Intervention 1: 0/10      Objective:     Communicated with RN prior to session.  Patient found up in chair with oxygen, peripheral IV, telemetry(daughter present) upon PT entry to room.     General Precautions: Standard, fall   Orthopedic Precautions:N/A   Braces:       Functional Mobility:  · Transfers:     · Sit to Stand:  Trial 1: contact guard assistance with rolling walker and trial 2: min A with RW  · Gait: 20 feet with RW and CGA      AM-PAC 6 CLICK MOBILITY  Moving to and from a bed to a chair (including a wheelchair)?:  3       Therapeutic Activities and Exercises:   sit> stand;  transfer training; gait training     Patient left up in chair with all lines intact, call button in reach and daughter present..    GOALS:   Multidisciplinary Problems     Physical Therapy Goals        Problem: Physical Therapy Goal    Goal Priority Disciplines Outcome Goal Variances Interventions   Physical Therapy Goal     PT, PT/OT Ongoing, Progressing     Description:  Goals to be met by: discharge      Patient will increase functional independence with mobility by performing:     . Supine to sit with Stand-by Assistance. Established 12/15- Max A  . Sit to supine with Stand-by Assistance. Established 12/15 NT  . Sit to stand transfer with Stand-by Assistance. Established 12/15- Min A RW  . Bed to chair transfer with Supervision using Rolling Walker. Established 12/15-  mod A stand  pivot  . Gait  x 50  feet with Supervision using Rolling Walker. . Established 12/15- 50 ft RW min A                          Time Tracking:     PT Received On: 12/17/19  PT Start Time: 1127     PT Stop Time: 1150  PT Total Time (min): 23 min     Billable Minutes: Gait Training 10 and Therapeutic Activity 13    Treatment Type: Treatment  PT/PTA: PTA     PTA Visit Number: 2     Hillary Hammant, MARQUISE  12/17/2019

## 2019-12-17 NOTE — ASSESSMENT & PLAN NOTE
Chronic.  X-rays obtained yesterday and reviewed.  No acute changes.  Mild degenerative changes most prominent at C5-C6.

## 2019-12-17 NOTE — PLAN OF CARE
Problem: Infection  Goal: Infection Symptom Resolution  Outcome: Ongoing, Progressing     Problem: Fall Injury Risk  Goal: Absence of Fall and Fall-Related Injury  Outcome: Ongoing, Progressing

## 2019-12-17 NOTE — ASSESSMENT & PLAN NOTE
Potassium remains low this morning at 3.3.  Review of admission medication seems she was on 20 mEq oral supplementation.  Resume daily supplementation with 20 mEq.  Recheck levels tomorrow.

## 2019-12-17 NOTE — PT/OT/SLP PROGRESS
Occupational Therapy   Treatment    Name: Katy Melchor  MRN: [id number]  Admitting Diagnosis:  Acute-on-chronic kidney injury       Recommendations:     Discharge Recommendations: (back to nursing [facility])  Discharge Equipment Recommendations:  none  Barriers to discharge:  None    Assessment:     Katy Melchor is a 79 y.o. female with a medical diagnosis of Acute-on-chronic kidney injury.  Pt agreeable to OT therapy session this AM. Performance deficits affecting function are weakness, impaired endurance, impaired self care skills, impaired cognition, impaired functional mobilty, impaired balance, decreased safety awareness. Therapist attempted to educate patient on use of AD for LB dressing, but patient could not follow the one-step directions/demonstrations given to effectively use equipment.    Rehab Prognosis:  Fair; patient would benefit from acute skilled OT services to address these deficits and reach maximum level of function.       Plan:     Patient to be seen 5 x/week to address the above listed problems via self-care/home management, therapeutic activities, therapeutic exercises  · Plan of Care Expires: 01/16/20  · Plan of Care Reviewed with: patient    Subjective     Pain/Comfort:  · Pain Rating 1: 0/10    Objective:     Communicated with: nursing prior to session.  Patient found up in chair with peripheral IV, telemetry, oxygen upon OT entry to room.    General Precautions: Standard, fall   Orthopedic Precautions:N/A   Braces: N/A     Occupational Performance:     Activities of Daily Living:  · Grooming: minimum assistance pt continues to require cues for correct sequencing and steps during teeth brushing task; Supervision for washing face  · Lower Body Dressing: moderate assistance to don/doff socks using AD    Treatment & Education:  Pt educated on role of OT and safety during ADLs.    Patient left up in chair with all lines intact, call button in reach and daughter presentEducation:      GOALS:    Multidisciplinary Problems     Occupational Therapy Goals        Problem: Occupational Therapy Goal    Goal Priority Disciplines Outcome Interventions   Occupational Therapy Goal     OT, PT/OT Ongoing, Progressing    Description:  Goals to be met by: discharge    Patient will increase functional independence with ADLs by performing:    LE Dressing with Contact Guard Assistance.  Grooming while seated with Stand-by Assistance.  Toileting from bedside commode with Moderate Assistance for hygiene and clothing management.   Toilet transfer to bedside commode with Contact Guard Assistance.                      Time Tracking:     OT Date of Treatment: 12/17/19  OT Start Time: 0953  OT Stop Time: 1010  OT Total Time (min): 17 min    Billable Minutes:Self Care/Home Management 17    Zoey Reilly OT  12/17/2019

## 2019-12-17 NOTE — PLAN OF CARE
Problem: Occupational Therapy Goal  Goal: Occupational Therapy Goal  Description  Goals to be met by: discharge    Patient will increase functional independence with ADLs by performing:    LE Dressing with Contact Guard Assistance.  Grooming while seated with Stand-by Assistance.  Toileting from bedside commode with Moderate Assistance for hygiene and clothing management.   Toilet transfer to bedside commode with Contact Guard Assistance.     Outcome: Ongoing, Progressing

## 2019-12-17 NOTE — PLAN OF CARE
Problem: Physical Therapy Goal  Goal: Physical Therapy Goal  Description  Goals to be met by: discharge      Patient will increase functional independence with mobility by performing:     . Supine to sit with Stand-by Assistance. Established 12/15- Max A  . Sit to supine with Stand-by Assistance. Established 12/15 NT  . Sit to stand transfer with Stand-by Assistance. Established 12/15- Min A RW  . Bed to chair transfer with Supervision using Rolling Walker. Established 12/15-  mod A stand  pivot  . Gait  x 50  feet with Supervision using Rolling Walker. . Established 12/15- 50 ft RW min A         Outcome: Ongoing, Progressing   Pt progressing toward meeting goals . Pt limited 2/2 increased general fatigue.

## 2019-12-17 NOTE — ASSESSMENT & PLAN NOTE
Continue home dementia medications.  At risk of delirium.  Daughter at bedside to help with frequent orientation, family faces.  Delirium precaution.

## 2019-12-17 NOTE — PROGRESS NOTES
INPATIENT NEPHROLOGY PROGRESS NOTE  [facility] NEPHROLOGY    Patient Name: Katy Melchor  Date: 12/17/2019    Reason for consultation: NIKKI    Chief Complaint:   Chief Complaint   Patient presents with    Weakness     Times several days       History of Present Illness:  Patient is a 79-year-old female with past medical history of CHF, CAD (recent NSTEMI), paroxysmal atrial fibrillation, dementia, diabetes, high cholesterol, hypertension, depression, hypothyroidism and CKD presents to the ER from [facility] for generalized weakness. The daughters state that the pt has also had a cough for approx 3 weeks that is nonproductive and is currently being treated for a UTI with Bactrim. We are consulted for NIKKI.    · Interval History/Subjective:    - sitting in chair, good appetite, feels better, no cp/dyspnea, trace edema    · Review of Systems: All 14 systems reviewed and negative, except as noted in HPI    Plan of Care:    Assessment:  AoCKI, baseline stage III CKD  Drug induced NIKKI/UTI  Volume depletion  HTN  Hypokalemia/Contraction alkalosis  Anemia of CKD     Plan:     - Renal function is improved. No NSAIDs or IV contrast.   - Hold further doses of bactrim. Treated with CFTX.   - BP is better. She has mild edema. Will monitor. Hold lisinopril and torsemide today.   - Given KCl repletion. Alkalosis is worse. Hold sodium bicarbonate.  - Hb is stable.     Thank you for allowing us to participate in this patient's care. We will continue to follow.    Medications:  No current facility-administered medications on file prior to encounter.      Current Outpatient Medications on File Prior to Encounter   Medication Sig Dispense Refill    albuterol (PROVENTIL) 2.5 mg /3 mL (0.083 %) nebulizer solution Inhale 1 vial into the lungs every 6 (six) hours as needed.       amino acids/protein hydrolys (PRO-STAT SUGAR FREE ORAL) Take 30 mLs by mouth once daily.      aspirin (ECOTRIN) 81 MG EC tablet Take 1 tablet (81 mg total)  by mouth once daily. 30 tablet 2    atorvastatin (LIPITOR) 20 MG tablet Take 20 mg by mouth every evening.       bacitracin-polymyxin b (POLYSPORIN) ophthalmic ointment Place 1 application into both eyes every evening. APPLY A TINY STRIP TO FINGERTIP THEN RUB ALONG EYELASH MARGIN OF BOTH EYES      calcium carbonate/vitamin D3 (CALTRATE WITH VITAMIN D3 ORAL) Take 1 tablet by mouth once daily.       clopidogrel (PLAVIX) 75 mg tablet Take 1 tablet (75 mg total) by mouth once daily. 30 tablet 2    coenzyme Q10 (COQ-10) 100 mg capsule Take 200 mg by mouth once daily.      cyanocobalamin 1,000 mcg/mL injection Inject 1,000 mcg into the muscle every 30 days.       donepezil (ARICEPT) 10 MG tablet Take 10 mg by mouth every evening.      escitalopram oxalate (LEXAPRO) 20 MG tablet Take 20 mg by mouth every evening.       fluticasone propionate (FLONASE) 50 mcg/actuation nasal spray 1 spray by Each Nostril route daily as needed for Rhinitis.      fluticasone-umeclidin-vilanter (TRELEGY ELLIPTA) 100-62.5-25 mcg DsDv Inhale 1 puff into the lungs once daily.      guaifenesin 100 mg/5 ml (ROBITUSSIN) 100 mg/5 mL syrup Take 200 mg by mouth every 4 (four) hours as needed for Cough.       levalbuterol (XOPENEX HFA) 45 mcg/actuation inhaler Inhale 2 puffs into the lungs 2 (two) times daily. Rescue      levothyroxine (SYNTHROID) 125 MCG tablet Take 125 mcg by mouth before breakfast.       lisinopril (PRINIVIL,ZESTRIL) 2.5 MG tablet Take 1 tablet (2.5 mg total) by mouth once daily. 30 tablet 2    loratadine (CLARITIN) 10 mg tablet Take 10 mg by mouth daily as needed for Allergies.       memantine (NAMENDA) 5 MG Tab Take 5 mg by mouth 2 (two) times daily.       multivitamin Tab Take 1 tablet by mouth once daily.      polyvinyl alcohol-povidon,PF, (REFRESH CLASSIC, PF,) 1.4-0.6 % Dpet Place 1 drop into both eyes 4 (four) times daily.      potassium chloride (KLOR-CON M20 ORAL) Take 20 mEq by mouth once daily.        SITagliptin (JANUVIA) 50 MG Tab Take 100 mg by mouth once daily.      sodium bicarbonate 650 MG tablet Take 650 mg by mouth 3 (three) times daily.      sotalol (BETAPACE) 80 MG tablet Take 40 mg by mouth 2 (two) times daily.      torsemide (DEMADEX) 20 MG Tab Take 30 mg by mouth once daily.        Scheduled Meds:   aspirin  81 mg Oral Daily    atorvastatin  20 mg Oral QHS    clopidogrel  75 mg Oral Daily    donepezil  10 mg Oral QHS    enoxaparin  40 mg Subcutaneous Daily    escitalopram oxalate  20 mg Oral QHS    levothyroxine  125 mcg Oral Before breakfast    memantine  5 mg Oral BID    potassium chloride  20 mEq Oral Daily    sotalol  40 mg Oral Daily     Continuous Infusions:  PRN Meds:.acetaminophen, benzonatate, dextrose 50%, dextrose 50%, glucagon (human recombinant), glucose, glucose, insulin aspart U-100, magnesium oxide, magnesium sulfate IVPB, magnesium sulfate IVPB, magnesium sulfate IVPB, magnesium sulfate IVPB, ondansetron, potassium chloride in water, potassium chloride in water, potassium chloride in water, potassium chloride in water, potassium chloride, potassium chloride, potassium chloride, potassium chloride, sodium chloride 0.9%    Allergies:  Patient has no known allergies.    Vital Signs:  Temp Readings from Last 3 Encounters:   12/17/19 98.9 °F (37.2 °C)   10/08/19 99.1 °F (37.3 °C) (Oral)   10/05/19 98 °F (36.7 °C) (Oral)       Pulse Readings from Last 3 Encounters:   12/17/19 102   10/08/19 64   10/05/19 66       BP Readings from Last 3 Encounters:   12/17/19 (!) 161/65   10/08/19 (!) 114/56   10/05/19 (!) 132/57       Weight:  Wt Readings from Last 3 Encounters:   12/16/19 102.8 kg (226 lb 10.1 oz)   10/07/19 112.9 kg (248 lb 14.4 oz)   10/06/19 109.5 kg (241 lb 6.5 oz)       INS/OUTS:  I/O last 3 completed shifts:  In: 350 [P.O.:350]  Out: 2600 [Urine:2600]  No intake/output data recorded.    Physical Exam:  Constitutional: nad, aao x 3  Heart: rrr, no m/r/g, wwp, trace  edema  Lungs: ctab, no w/r/r/c, no lb  Abdomen: s/nt/nd, +BS    Results:  Lab Results   Component Value Date     12/17/2019    K 3.3 (L) 12/17/2019    CL 99 12/17/2019    CO2 30 (H) 12/17/2019    BUN 20 12/17/2019    CREATININE 1.2 12/17/2019    CALCIUM 8.7 12/17/2019    ANIONGAP 7 (L) 12/17/2019    ESTGFRAFRICA 50.0 (A) 12/17/2019    EGFRNONAA 43.4 (A) 12/17/2019       Lab Results   Component Value Date    CALCIUM 8.7 12/17/2019       Recent Labs   Lab 12/17/19  0814   WBC 11.55   RBC 3.75*   HGB 10.8*   HCT 33.9*      MCV 90   MCH 28.8   MCHC 31.9*       I have personally reviewed pertinent radiological imaging and reports.    I have spent > 35 minutes providing care for this patient for the above diagnoses. These services have included chart/data/imaging review, evaluation, exam, formulation of plan, , note preparation, and discussions with staff involved in this patient's care.    Selvin Pratt MD MPH  [facility] [address] [place] [address]  [phone] (p)  [phone] (f)

## 2019-12-17 NOTE — HOSPITAL COURSE
Patient was admitted for further management to the medical floor.  Admission labs with multiple derangements including acute kidney injury with creatinine up to 3.5, hyponatremia, hypokalemia, leukocytosis.  She was started on empiric Rocephin with Bactrim discontinued.  IV fluid hydration was also initiated with holding of torsemide and lisinopril.  Nephrology was consulted.  Dai catheter was placed.  Renal function slowly improved to baseline with creatinine ( discharge Cr 1.0).  Did develop alkalosis, home sodium bicarbonate was discontinued.  Did continue with chronic shortness of breath associated with nonproductive cough, as per daughter she is suppose to wear supplemental O2 however at times is not compliant.  Dai now discontinued and spontaneously voided.  Medical stable for discharge. Communicated with Nephrology on day of discharge. Discussed plan of care with daughter present at bedside.    Discharge examination  Elderly female patient, sitting in chair  Off supplemental oxygen, not on respiratory distress  Regular heart rhythm with trace lower extremity edema  Protuberant abdomen however soft and nontender

## 2019-12-17 NOTE — PROGRESS NOTES
[facility] Medicine  Progress Note    Patient Name: Katy Melchor  MRN: [id number]  Patient Class: OP- Observation   Admission Date: 12/14/2019  Length of Stay: 0 days  Attending Physician: Stephanie Linton MD  Primary Care Provider: Julius Nguyen MD        Subjective:     Principal Problem:Acute-on-chronic kidney injury        HPI:  Patient is a 79-year-old female with past medical history of CHF, CAD (recent NSTEMI), paroxysmal atrial fibrillation, dementia, diabetes, high cholesterol, hypertension, depression, hypothyroidism and CKD presents to the ER from [facility] for generalized weakness. The Pt's daughters who is at bedside states that patient is having increasing difficulties ambulating with her walker and has had frequent falls including today..  Per the daughter's the patient fell  today and hit  her head.   Daughter states that patient is acting her baseline.  Patient denies loss of consciousness.The daughters state that the pt has also had a cough for approx 3 weeks that is nonproductive and is currently being treated for a UTI with Bactrim.  Patient denies fever chills abdominal pain chest pain shortness of breath or extremity pain.  Patient also denies headache    Overview/Hospital Course:  Patient was admitted to for the management to the medical floor.  Admission labs with multiple derangements including acute kidney injury with creatinine up to 3.5, hyponatremia, hypokalemia, leukocytosis.  She was started on empiric Rocephin with Bactrim discontinued.  IV fluid hydration was also initiated with holding of torsemide and lisinopril.  Nephrology was consulted.  Dai catheter was placed.  Renal function slowly improved to baseline with creatinine 1.2 on 12/17.  Continues with contraction alkalosis.  Still with chronic shortness of breath associated with nonproductive cough.  Dai now discontinued and has since spontaneously voided.  Case discussed with daughter and  nursing.  Tentative plan for discharge back to [facility] tomorrow pending no acute issues.    Interval History:  Patient seen with daughter and nursing present.  Continues with chronic shortness of breath associated with nonproductive coughing.  Daughters states she is on chronic oxygen therapy.  Dai has now been discontinued and has since spontaneously voided.  Had bowel movement yesterday.  Hopeful to sit out of bed today. T-max last 24 hr 99.3.  Blood pressure acceptable.  Reviewed admission workup.  CT head cephalohematoma right parieto-occipital region.  Chest x-ray clear.  C-spine degenerative changes most prominent at C5-C6, no acute.  Urine culture 12/04 growing citrobacter koseri. No AM labs were ordered and therefore ordered stat prior to encounter.     Review of Systems   Constitutional: Negative for chills and fever.   Respiratory: Positive for cough (non productive) and shortness of breath.    Cardiovascular: Negative for chest pain, palpitations and leg swelling.   Gastrointestinal: Positive for abdominal pain (generalized). Negative for constipation, diarrhea, nausea and vomiting.   Genitourinary: Negative for dysuria.   Musculoskeletal: Positive for neck pain (chronic).   Neurological: Positive for weakness (generalized).     Objective:     Vital Signs (Most Recent):  Temp: 99.3 °F (37.4 °C) (12/17/19 0755)  Pulse: 91 (12/17/19 0755)  Resp: 18 (12/17/19 0755)  BP: 102/66 (12/17/19 0755)  SpO2: 97 % (12/17/19 0755) Vital Signs (24h Range):  Temp:  [97.9 °F (36.6 °C)-99.3 °F (37.4 °C)] 99.3 °F (37.4 °C)  Pulse:  [86-98] 91  Resp:  [18-19] 18  SpO2:  [94 %-97 %] 97 %  BP: ()/(54-72) 102/66     Weight: 102.8 kg (226 lb 10.1 oz)  Body mass index is 37.71 kg/m².    Intake/Output Summary (Last 24 hours) at 12/17/2019 0916  Last data filed at 12/16/2019 2300  Gross per 24 hour   Intake --   Output 100 ml   Net -100 ml      Physical Exam   Constitutional: She is oriented to person, place,  and time.   Elderly obese female patient lying in bed, no apparent distress, cooperative, pleasant   HENT:   Head: Normocephalic.   Moist mucous membrane   Eyes: Right eye exhibits no discharge. Left eye exhibits no discharge.   Neck: Neck supple.   Cardiovascular:   Slow heart rate, no significant lower extremity edema   Pulmonary/Chest: Effort normal. No stridor. No respiratory distress. She has no wheezes.   On supplemental oxygen via nasal cannula, reduced air entry at bases   Abdominal: There is tenderness. There is no rebound and no guarding.   Protuberant, mild tenderness most prominent right lower quadrant, no peritoneal signs, bowel sounds present   Genitourinary:   Genitourinary Comments: Dai catheter now removed.  No suprapubic fullness.   Musculoskeletal:   Osteoarthritis changes.   Neurological: She is alert and oriented to person, place, and time.   Able to lift bilateral lower extremity against gravity.  Speech is normal.   Skin: Skin is warm and dry.   Psychiatric: She has a normal mood and affect.   Nursing note and vitals reviewed.      Significant Labs:   BMP:   Recent Labs   Lab 12/17/19  0814         K 3.3*   CL 99   CO2 30*   BUN 20   CREATININE 1.2   CALCIUM 8.7     CBC:   Recent Labs   Lab 12/17/19  0814   WBC 11.55   HGB 10.8*   HCT 33.9*        CMP:   Recent Labs   Lab 12/16/19  1111 12/17/19  0814   * 136   K 3.4* 3.3*    99   CO2 29 30*   * 107   BUN 20 20   CREATININE 1.5* 1.2   CALCIUM 8.5* 8.7   ANIONGAP 5* 7*   EGFRNONAA 33.1* 43.4*     TSH:   Recent Labs   Lab 12/14/19  1927   TSH 0.460     Urine Culture: No results for input(s): LABURIN in the last 48 hours.  Urine Studies: No results for input(s): COLORU, APPEARANCEUA, PHUR, SPECGRAV, PROTEINUA, GLUCUA, KETONESU, BILIRUBINUA, OCCULTUA, NITRITE, UROBILINOGEN, LEUKOCYTESUR, RBCUA, WBCUA, BACTERIA, SQUAMEPITHEL, HYALINECASTS in the last 48 hours.    Invalid input(s): WRIGHTSUR  All pertinent  labs within the past 24 hours have been reviewed.    Significant Imaging: CT: I have reviewed all pertinent results/findings within the past 24 hours and my personal findings are:  Cephalohematoma right parieto-occipital region  CXR: I have reviewed all pertinent results/findings within the past 24 hours and my personal findings are:  No focal infiltrates or consolidation  I have reviewed all pertinent imaging results/findings within the past 24 hours.     X-ray Chest Pa And Lateral    Result Date: 12/14/2019  EXAMINATION: XR CHEST PA AND LATERAL CLINICAL HISTORY: Cough; TECHNIQUE: PA and lateral views of the chest were performed. COMPARISON: 10/05/2019 FINDINGS: The lungs are clear, with normal appearance of pulmonary vasculature and no pleural effusion or pneumothorax. The cardiac silhouette is normal in size. The hilar and mediastinal contours are unremarkable. Bones are intact.     No acute abnormality. Electronically signed by: Adonis Arguelles MD Date:    12/14/2019 Time:    18:45    X-ray Cervical Spine Complete 5 View    Result Date: 12/16/2019  EXAMINATION: XR CERVICAL SPINE COMPLETE 5 VIEW CLINICAL HISTORY: neck pain; FINDINGS: Five views of the cervical spine The cervical spine is in satisfactory alignment.  The vertebral bodies are of normal height.  There is disc space narrowing at C5-6.  Facet joints are aligned.  The odontoid process is intact and lateral masses of C1 are symmetrical.  The prevertebral soft tissues are normal.     Mild degenerative changes at C5-6 with no acute osseous abnormality Electronically signed by: Corina Jeong MD Date:    12/16/2019 Time:    11:20    Ct Head Without Contrast    Result Date: 12/14/2019  EXAMINATION: CT HEAD WITHOUT CONTRAST CLINICAL HISTORY: Confusion/delirium, altered LOC, unexplained; TECHNIQUE: Low dose axial images were obtained through the head.  Coronal and sagittal reformations were also performed. Contrast was not administered. COMPARISON: None.  FINDINGS: Crescentic high density focus marginates the outer cortical table spanning the right parietooccipital zone extending cephalad secondary rather large cephalohematoma formation.  There is evidence of generalized cortical and central involutional changes that appear age-appropriate as manifested by deepening the cortical sulci and widening the cranial fissures.  The superimposed low-density areas are established throughout the bilateral Diencephalic hemispheres secondary to evolving deep white matter ischemia.  No evidence of intraparenchymal hemorrhage or mass lesion or acute cortical infarct.  Ventricles are prominent in size congruent with degree of cerebral atrophy.  No significant subdural or epidural collections.  Orbits retrobulbar compartments, pituitary gland, and pituitary stock are normal.     1. No evidence of acute intracranial findings. 2. Generalized cortical and central involutional changes with evidence of background deep white matter ischemic changes. 3. Prominent cephalohematoma formation projecting over the right parietooccipital convexity extending cephalad perhaps related to recent traumatic episode. Electronically signed by: Adonis Arguelles MD Date:    12/14/2019 Time:    19:15  ECG Results    None             Assessment/Plan:   Changed to inpatient status given severity of acute kidney injury, multiple morbidity, fraility, risk of complications, expected LOS.  Past medical/surgical/family/social history as per HPI documented in EMR.    * NIKKI on CKD III  Admission labs with creatinine 3.5.  Today down trended to 1.3, at baseline.  Likely multifactorial secondary to dehydration with continue torsemide use, Bactrim and UTI.  Did receive IV fluid hydration which is now discontinued.   Continue to hold torsemide and lisinopril.  Discussed with daughter at some point will need to re-initiate torsemide.  Renally dose all medications and avoid nephrotoxin drugs.  Repeat BMP tomorrow- if stable  can consider discharge tomorrow  Appreciate Nephrology input    Hypokalemia  Potassium remains low this morning at 3.3.  Review of admission medication seems she was on 20 mEq oral supplementation.  Resume daily supplementation with 20 mEq.  Recheck levels tomorrow.      Contraction alkalosis  CO2 up to 30 today.  Hold oral sodium bicarbonate.  Holding torsemide.  Recheck levels in a.m..      UTI (urinary tract infection)  Urine culture 12/04 Citrobacter koseri.  She was on Bactrim prior to admission.  Now also completed 3 days of Rocephin.  Discontinue further antibiotics.  Continue to monitor clinically.    Depression  Hyponatremia now resolved and not likely related to SSRI therapy.  Restart daily escitalopram.    Neck pain  Chronic.  X-rays obtained yesterday and reviewed.  No acute changes.  Mild degenerative changes most prominent at C5-C6.      Dementia  Continue home dementia medications.  At risk of delirium.  Daughter at bedside to help with frequent orientation, family faces.  Delirium precaution.    Generalized weakness  Probably multifactorial.  PT/OT following. Up to chair today with meals.       CHF (congestive heart failure)  Admitted with hyponatremia with dehydration and acute kidney injury.  Continue to hold torsemide for now.  Also with contraction alkalosis.      Chronic a-fib  Remote telemetry monitoring.  On sotalol.      Coronary disease  History of recent non STEMI status post PCI.    Continue dual antiplatelet therapy with aspirin and Plavix.  Resume home Lipitor 20 mg q.h.s..    Diabetes mellitus, type 2  Hold Januvia.  Insulin sliding scale t.i.d. with meals.  Accu-Cheks.  As needed hypoglycemic measures.      COPD (chronic obstructive pulmonary disease) on home oxygen  No evidence of acute exacerbation at this time.  Continuing home medications.  Trilogy not available on formulary.      Hyperlipidemia  Restart atorvastatin.      HTN (hypertension)  Blood pressure currently acceptable.   Holding torsemide and lisinopril for now.        VTE Risk Mitigation (From admission, onward)         Ordered     enoxaparin injection 40 mg  Daily      12/16/19 1158     IP VTE HIGH RISK PATIENT  Once      12/14/19 2112                      Stephanie Linton MD  Department of Hospital Medicine   [facility]

## 2019-12-17 NOTE — ASSESSMENT & PLAN NOTE
History of recent non STEMI status post PCI.    Continue dual antiplatelet therapy with aspirin and Plavix.  Resume home Lipitor 20 mg q.h.s..

## 2019-12-17 NOTE — ASSESSMENT & PLAN NOTE
No evidence of acute exacerbation at this time.  Continuing home medications.  Trilogy not available on formulary.

## 2019-12-18 VITALS
HEART RATE: 60 BPM | BODY MASS INDEX: 37.76 KG/M2 | OXYGEN SATURATION: 96 % | SYSTOLIC BLOOD PRESSURE: 93 MMHG | HEIGHT: 65 IN | TEMPERATURE: 98 F | WEIGHT: 226.63 LBS | DIASTOLIC BLOOD PRESSURE: 53 MMHG | RESPIRATION RATE: 17 BRPM

## 2019-12-18 PROBLEM — N17.9 AKI (ACUTE KIDNEY INJURY): Status: RESOLVED | Noted: 2019-12-17 | Resolved: 2019-12-18

## 2019-12-18 PROBLEM — N17.9 ACUTE-ON-CHRONIC KIDNEY INJURY: Status: RESOLVED | Noted: 2019-12-14 | Resolved: 2019-12-18

## 2019-12-18 PROBLEM — I48.91 ATRIAL FIBRILLATION: Chronic | Status: ACTIVE | Noted: 2019-10-05

## 2019-12-18 PROBLEM — N18.9 ACUTE-ON-CHRONIC KIDNEY INJURY: Status: RESOLVED | Noted: 2019-12-14 | Resolved: 2019-12-18

## 2019-12-18 PROBLEM — E87.3 ALKALOSIS: Status: RESOLVED | Noted: 2019-12-17 | Resolved: 2019-12-18

## 2019-12-18 PROBLEM — N39.0 UTI (URINARY TRACT INFECTION): Status: RESOLVED | Noted: 2019-12-14 | Resolved: 2019-12-18

## 2019-12-18 LAB
ANION GAP SERPL CALC-SCNC: 5 MMOL/L (ref 8–16)
BUN SERPL-MCNC: 23 MG/DL (ref 8–23)
CALCIUM SERPL-MCNC: 8.7 MG/DL (ref 8.7–10.5)
CHLORIDE SERPL-SCNC: 99 MMOL/L (ref 95–110)
CO2 SERPL-SCNC: 29 MMOL/L (ref 23–29)
CREAT SERPL-MCNC: 1 MG/DL (ref 0.5–1.4)
ERYTHROCYTE [DISTWIDTH] IN BLOOD BY AUTOMATED COUNT: 14.5 % (ref 11.5–14.5)
EST. GFR  (AFRICAN AMERICAN): >60 ML/MIN/1.73 M^2
EST. GFR  (NON AFRICAN AMERICAN): 54.1 ML/MIN/1.73 M^2
GLUCOSE SERPL-MCNC: 103 MG/DL (ref 70–110)
GLUCOSE SERPL-MCNC: 103 MG/DL (ref 70–110)
HCT VFR BLD AUTO: 33 % (ref 37–48.5)
HGB BLD-MCNC: 10.6 G/DL (ref 12–16)
MAGNESIUM SERPL-MCNC: 2.2 MG/DL (ref 1.6–2.6)
MCH RBC QN AUTO: 29 PG (ref 27–31)
MCHC RBC AUTO-ENTMCNC: 32.1 G/DL (ref 32–36)
MCV RBC AUTO: 90 FL (ref 82–98)
PHOSPHATE SERPL-MCNC: 3 MG/DL (ref 2.7–4.5)
PLATELET # BLD AUTO: 275 K/UL (ref 150–350)
PMV BLD AUTO: 10.3 FL (ref 9.2–12.9)
POTASSIUM SERPL-SCNC: 3.4 MMOL/L (ref 3.5–5.1)
RBC # BLD AUTO: 3.66 M/UL (ref 4–5.4)
SODIUM SERPL-SCNC: 133 MMOL/L (ref 136–145)
WBC # BLD AUTO: 9.73 K/UL (ref 3.9–12.7)

## 2019-12-18 PROCEDURE — 83735 ASSAY OF MAGNESIUM: CPT

## 2019-12-18 PROCEDURE — 85027 COMPLETE CBC AUTOMATED: CPT

## 2019-12-18 PROCEDURE — 94761 N-INVAS EAR/PLS OXIMETRY MLT: CPT

## 2019-12-18 PROCEDURE — 27000221 HC OXYGEN, UP TO 24 HOURS

## 2019-12-18 PROCEDURE — 25000003 PHARM REV CODE 250: Performed by: INTERNAL MEDICINE

## 2019-12-18 PROCEDURE — 25000003 PHARM REV CODE 250: Performed by: HOSPITALIST

## 2019-12-18 PROCEDURE — 84100 ASSAY OF PHOSPHORUS: CPT

## 2019-12-18 PROCEDURE — 97530 THERAPEUTIC ACTIVITIES: CPT

## 2019-12-18 PROCEDURE — 63600175 PHARM REV CODE 636 W HCPCS: Performed by: HOSPITALIST

## 2019-12-18 PROCEDURE — 80048 BASIC METABOLIC PNL TOTAL CA: CPT

## 2019-12-18 PROCEDURE — 36415 COLL VENOUS BLD VENIPUNCTURE: CPT

## 2019-12-18 PROCEDURE — 97116 GAIT TRAINING THERAPY: CPT

## 2019-12-18 PROCEDURE — 97535 SELF CARE MNGMENT TRAINING: CPT

## 2019-12-18 RX ADMIN — SOTALOL HYDROCHLORIDE 40 MG: 80 TABLET ORAL at 08:12

## 2019-12-18 RX ADMIN — MEMANTINE HYDROCHLORIDE 5 MG: 5 TABLET ORAL at 08:12

## 2019-12-18 RX ADMIN — LEVOTHYROXINE SODIUM 125 MCG: 25 TABLET ORAL at 05:12

## 2019-12-18 RX ADMIN — ASPIRIN 81 MG: 81 TABLET, DELAYED RELEASE ORAL at 08:12

## 2019-12-18 RX ADMIN — POTASSIUM CHLORIDE 20 MEQ: 20 TABLET, EXTENDED RELEASE ORAL at 08:12

## 2019-12-18 RX ADMIN — CLOPIDOGREL BISULFATE 75 MG: 75 TABLET, FILM COATED ORAL at 08:12

## 2019-12-18 RX ADMIN — POTASSIUM CHLORIDE 40 MEQ: 7.46 INJECTION, SOLUTION INTRAVENOUS at 06:12

## 2019-12-18 NOTE — PLAN OF CARE
Problem: Physical Therapy Goal  Goal: Physical Therapy Goal  Description  Goals to be met by: discharge      Patient will increase functional independence with mobility by performing:     . Supine to sit with Stand-by Assistance. Established 12/15- Max A  . Sit to supine with Stand-by Assistance. Established 12/15 NT  . Sit to stand transfer with Stand-by Assistance. Established 12/15- Min A RW  . Bed to chair transfer with Supervision using Rolling Walker. Established 12/15-  mod A stand  pivot  . Gait  x 50  feet with Supervision using Rolling Walker. . Established 12/15- 50 ft RW min A         Outcome: Ongoing, Progressing   ,care

## 2019-12-18 NOTE — ASSESSMENT & PLAN NOTE
Likely secondary to medication induced/Bactrim, dehydration and urinary tract infection.  Discharge creatinine 1.0.  Discontinue lisinopril, resume torsemide on 12/19/19.

## 2019-12-18 NOTE — ASSESSMENT & PLAN NOTE
History of recent non STEMI status post PCI.    Continue dual antiplatelet therapy, statin therapy.

## 2019-12-18 NOTE — PLAN OF CARE
12/18/19 0950   Discharge Reassessment   Assessment Type Discharge Planning Reassessment   Discharge Plan A Return to nursing home     D/C orders are in for pt to return to [facility], where she is a resident. BOBBY sent updated clinicals to Claudia at  yesterday, and sent d/c orders via right fax this morning. BOBBY spoke with Claudia by phone ([phone]) and informed her of pt's d/c. Per Claudia, their  is not available to transport pt today. BOBBY spoke with pt's dtr Meghna, who is at pt's bedside and prepared to transport her back to . BOBBY awaiting information from  for pt's nurse to call report.    10:24 Updates were provided to pt's nurse.    10:48 BOBBY left a msg for Crsytal at  to follow up on d/c orders and report information.    11:21 BOBBY received a call from [employer] that report can be called to Betty Holm at [phone].

## 2019-12-18 NOTE — DISCHARGE SUMMARY
Formerly [facility], later [facility]  Discharge Summary      Patient Name: Katy Melchor  MRN: [id number]  Admission Date: 12/14/2019  Hospital Length of Stay: 1 days  Discharge Date and Time:  12/18/2019 9:16 AM  Attending Physician: Stephanie Linton MD   Discharging Provider: Stephanie Linton MD  Primary Care Provider: Julius Nguyen MD      HPI:   Patient is a 79-year-old female with past medical history of CHF, CAD (recent NSTEMI), paroxysmal atrial fibrillation, dementia, diabetes, high cholesterol, hypertension, depression, hypothyroidism and CKD presents to the ER from [facility] for generalized weakness. The Pt's daughters who is at bedside states that patient is having increasing difficulties ambulating with her walker and has had frequent falls including today..  Per the daughter's the patient fell  today and hit  her head.   Daughter states that patient is acting her baseline.  Patient denies loss of consciousness.The daughters state that the pt has also had a cough for approx 3 weeks that is nonproductive and is currently being treated for a UTI with Bactrim.  Patient denies fever chills abdominal pain chest pain shortness of breath or extremity pain.  Patient also denies headache.    * No surgery found *      Hospital Course:   Patient was admitted for further management to the medical floor.  Admission labs with multiple derangements including acute kidney injury with creatinine up to 3.5, hyponatremia, hypokalemia, leukocytosis.  She was started on empiric Rocephin with Bactrim discontinued.  IV fluid hydration was also initiated with holding of torsemide and lisinopril.  Nephrology was consulted.  Dai catheter was placed.  Renal function slowly improved to baseline with creatinine ( discharge Cr 1.0).  Did develop alkalosis, home sodium bicarbonate was discontinued.  Did continue with chronic shortness of breath associated with nonproductive cough, as per daughter she is suppose  to wear supplemental O2 however at times is not compliant.  Dai now discontinued and spontaneously voided.  Medical stable for discharge. Communicated with Nephrology on day of discharge. Discussed plan of care with daughter present at bedside.    Discharge examination  Elderly female patient, sitting in chair  Off supplemental oxygen, not on respiratory distress  Regular heart rhythm with trace lower extremity edema  Protuberant abdomen however soft and nontender     Consults:   Consults (From admission, onward)        Status Ordering Provider     Inpatient consult to Nephrology  Once     Provider:  (Not yet assigned)    BLANCA Umana          Hypokalemia  Resume daily supplementation with 20 mEq.        Contraction alkalosis-resolved as of 12/18/2019  Patient was in 3 times a day sodium bicarbonate which was discontinued.    UTI (urinary tract infection)-resolved as of 12/18/2019  Urine culture 12/04 Citrobacter koseri.  She was on Bactrim prior to admission.  Now also completed 3 days of Rocephin.      Depression  On escitalopram.    Neck pain  Chronic.  X-rays obtained yesterday and reviewed.  No acute changes.  Mild degenerative changes most prominent at C5-C6.      Dementia  Continue home dementia medications.  At risk of delirium.    Generalized weakness  Probably multifactorial.    CHF (congestive heart failure)  Admitted with hyponatremia with dehydration and acute kidney injury.  Torsemide was held.  Plan is to resume on 12/19.    Atrial fibrillation  In sinus rhythm, paroxysmal, continue sotalol.      Coronary disease  History of recent non STEMI status post PCI.    Continue dual antiplatelet therapy, statin therapy.    Diabetes mellitus, type 2  Continue home medications.      COPD (chronic obstructive pulmonary disease) on home oxygen  No evidence of acute exacerbation at this time.  Continue home medications.  Supplemental oxygen.    Hyperlipidemia  Continue statin therapy.      HTN  (hypertension)  Blood pressure currently acceptable.  Lisinopril discontinued.    NIKKI (acute kidney injury)-resolved as of 12/18/2019  Likely secondary to medication induced/Bactrim, dehydration and urinary tract infection.  Discharge creatinine 1.0.  Discontinue lisinopril, resume torsemide on 12/19/19.      Hyponatremia-resolved as of 12/17/2019          Final Active Diagnoses:    Diagnosis Date Noted POA    Hypokalemia [E87.6] 12/15/2019 No    Depression [F32.9] 12/17/2019 Yes     Chronic    Neck pain [M54.2] 12/16/2019 Yes    Generalized weakness [R53.1] 12/14/2019 Yes    Dementia [F03.90] 12/14/2019 Yes    Coronary disease [I25.10] 10/05/2019 Yes    Diabetes mellitus, type 2 [E11.9] 10/05/2019 Yes    HTN (hypertension) [I10] 10/05/2019 Yes    COPD (chronic obstructive pulmonary disease) on home oxygen [J44.9] 10/05/2019 Yes    Atrial fibrillation [I48.91] 10/05/2019 Yes     Chronic    CHF (congestive heart failure) [I50.9] 10/05/2019 Yes    Hyperlipidemia [E78.5] 10/05/2019 Yes      Problems Resolved During this Admission:    Diagnosis Date Noted Date Resolved POA    Contraction alkalosis [E87.3] 12/17/2019 12/18/2019 Yes    UTI (urinary tract infection) [N39.0] 12/14/2019 12/18/2019 Yes    NIKKI (acute kidney injury) [N17.9] 12/17/2019 12/18/2019 Yes    Hyponatremia [E87.1] 12/14/2019 12/17/2019 Yes       Discharged Condition: fair    Disposition: Long Term Care    Follow Up:  Follow-up Information     Julius Nguyen MD. Schedule an appointment as soon as possible for a visit in 2 weeks.    Specialties:  Hospitalist, Family Medicine, Internal Medicine, Cardiology  Why:  Post hospital follow-up  Contact information:  [address] [place] [id number] [phone]                 Patient Instructions:      Diet Cardiac     Activity as tolerated       Significant Diagnostic Studies: Labs:   BMP:   Recent Labs   Lab 12/16/19  1111 12/17/19  0814 12/18/19  0446   * 107 103   * 136  133*   K 3.4* 3.3* 3.4*    99 99   CO2 29 30* 29   BUN 20 20 23   CREATININE 1.5* 1.2 1.0   CALCIUM 8.5* 8.7 8.7   MG  --   --  2.2   , CMP   Recent Labs   Lab 12/16/19  1111 12/17/19  0814 12/18/19  0446   * 136 133*   K 3.4* 3.3* 3.4*    99 99   CO2 29 30* 29   * 107 103   BUN 20 20 23   CREATININE 1.5* 1.2 1.0   CALCIUM 8.5* 8.7 8.7   ANIONGAP 5* 7* 5*   ESTGFRAFRICA 38.2* 50.0* >60.0   EGFRNONAA 33.1* 43.4* 54.1*   , CBC   Recent Labs   Lab 12/17/19  0814 12/18/19  0447   WBC 11.55 9.73   HGB 10.8* 10.6*   HCT 33.9* 33.0*    275   , INR No results found for: INR, PROTIME, Lipid Panel   Lab Results   Component Value Date    CHOL 134 10/06/2019    CHOL 134 10/06/2019    HDL 39 (L) 10/06/2019    HDL 39 (L) 10/06/2019    LDLCALC 71.8 10/06/2019    LDLCALC 71.8 10/06/2019    TRIG 116 10/06/2019    TRIG 116 10/06/2019    CHOLHDL 29.1 10/06/2019    CHOLHDL 29.1 10/06/2019   , Troponin   Recent Labs   Lab 12/15/19  0614   TROPONINI 0.030   , A1C:   Recent Labs   Lab 08/29/19  0706 10/06/19  0449 11/26/19  0858   HGBA1C 6.1 6.6* 6.0    and All labs within the past 24 hours have been reviewed  Microbiology:   Urine Culture    Lab Results   Component Value Date    LABURIN CITROBACTER KOSERI  50,000 - 99,999 cfu/ml   (A) 12/04/2019   X-ray Chest Pa And Lateral    Result Date: 12/14/2019  EXAMINATION: XR CHEST PA AND LATERAL CLINICAL HISTORY: Cough; TECHNIQUE: PA and lateral views of the chest were performed. COMPARISON: 10/05/2019 FINDINGS: The lungs are clear, with normal appearance of pulmonary vasculature and no pleural effusion or pneumothorax. The cardiac silhouette is normal in size. The hilar and mediastinal contours are unremarkable. Bones are intact.     No acute abnormality. Electronically signed by: Adonis Arguelles MD Date:    12/14/2019 Time:    18:45    X-ray Cervical Spine Complete 5 View    Result Date: 12/16/2019  EXAMINATION: XR CERVICAL SPINE COMPLETE 5 VIEW CLINICAL  HISTORY: neck pain; FINDINGS: Five views of the cervical spine The cervical spine is in satisfactory alignment.  The vertebral bodies are of normal height.  There is disc space narrowing at C5-6.  Facet joints are aligned.  The odontoid process is intact and lateral masses of C1 are symmetrical.  The prevertebral soft tissues are normal.     Mild degenerative changes at C5-6 with no acute osseous abnormality Electronically signed by: Corina Jeong MD Date:    12/16/2019 Time:    11:20    Ct Head Without Contrast    Result Date: 12/14/2019  EXAMINATION: CT HEAD WITHOUT CONTRAST CLINICAL HISTORY: Confusion/delirium, altered LOC, unexplained; TECHNIQUE: Low dose axial images were obtained through the head.  Coronal and sagittal reformations were also performed. Contrast was not administered. COMPARISON: None. FINDINGS: Crescentic high density focus marginates the outer cortical table spanning the right parietooccipital zone extending cephalad secondary rather large cephalohematoma formation.  There is evidence of generalized cortical and central involutional changes that appear age-appropriate as manifested by deepening the cortical sulci and widening the cranial fissures.  The superimposed low-density areas are established throughout the bilateral Diencephalic hemispheres secondary to evolving deep white matter ischemia.  No evidence of intraparenchymal hemorrhage or mass lesion or acute cortical infarct.  Ventricles are prominent in size congruent with degree of cerebral atrophy.  No significant subdural or epidural collections.  Orbits retrobulbar compartments, pituitary gland, and pituitary stock are normal.     1. No evidence of acute intracranial findings. 2. Generalized cortical and central involutional changes with evidence of background deep white matter ischemic changes. 3. Prominent cephalohematoma formation projecting over the right parietooccipital convexity extending cephalad perhaps related to recent  traumatic episode. Electronically signed by: Adonis Arguelles MD Date:    12/14/2019 Time:    19:15      Pending Diagnostic Studies:     None         Medications:  Reconciled Home Medications:      Medication List      CONTINUE taking these medications    albuterol 2.5 mg /3 mL (0.083 %) nebulizer solution  Commonly known as:  PROVENTIL  Inhale 1 vial into the lungs every 6 (six) hours as needed.     aspirin 81 MG EC tablet  Commonly known as:  ECOTRIN  Take 1 tablet (81 mg total) by mouth once daily.     atorvastatin 20 MG tablet  Commonly known as:  LIPITOR  Take 20 mg by mouth every evening.     bacitracin-polymyxin b ophthalmic ointment  Commonly known as:  POLYSPORIN  Place 1 application into both eyes every evening. APPLY A TINY STRIP TO FINGERTIP THEN RUB ALONG EYELASH MARGIN OF BOTH EYES     Betapace 80 MG tablet  Generic drug:  sotalol  Take 40 mg by mouth 2 (two) times daily.     CALTRATE WITH VITAMIN D3 ORAL  Take 1 tablet by mouth once daily.     clopidogrel 75 mg tablet  Commonly known as:  PLAVIX  Take 1 tablet (75 mg total) by mouth once daily.     CoQ-10 100 mg capsule  Generic drug:  coenzyme Q10  Take 200 mg by mouth once daily.     cyanocobalamin 1,000 mcg/mL injection  Inject 1,000 mcg into the muscle every 30 days.     donepezil 10 MG tablet  Commonly known as:  ARICEPT  Take 10 mg by mouth every evening.     escitalopram oxalate 20 MG tablet  Commonly known as:  LEXAPRO  Take 20 mg by mouth every evening.     fluticasone propionate 50 mcg/actuation nasal spray  Commonly known as:  FLONASE  1 spray by Each Nostril route daily as needed for Rhinitis.     guaifenesin 100 mg/5 ml 100 mg/5 mL syrup  Commonly known as:  ROBITUSSIN  Take 200 mg by mouth every 4 (four) hours as needed for Cough.     KLOR-CON M20 ORAL  Take 20 mEq by mouth once daily.     levothyroxine 125 MCG tablet  Commonly known as:  SYNTHROID  Take 125 mcg by mouth before breakfast.     loratadine 10 mg tablet  Commonly known as:   CLARITIN  Take 10 mg by mouth daily as needed for Allergies.     memantine 5 MG Tab  Commonly known as:  NAMENDA  Take 5 mg by mouth 2 (two) times daily.     multivitamin Tab  Take 1 tablet by mouth once daily.     PRO-STAT SUGAR FREE ORAL  Take 30 mLs by mouth once daily.     Refresh Classic (PF) 1.4-0.6 % Dpet  Generic drug:  polyvinyl alcohol-povidon(PF)  Place 1 drop into both eyes 4 (four) times daily.     SITagliptin 50 MG Tab  Commonly known as:  JANUVIA  Take 100 mg by mouth once daily.     torsemide 20 MG Tab  Commonly known as:  DEMADEX  Take 30 mg by mouth once daily.     Trelegy Ellipta 100-62.5-25 mcg Dsdv  Generic drug:  fluticasone-umeclidin-vilanter  Inhale 1 puff into the lungs once daily.     Xopenex HFA 45 mcg/actuation inhaler  Generic drug:  levalbuterol  Inhale 2 puffs into the lungs 2 (two) times daily. Rescue        STOP taking these medications    lisinopril 2.5 MG tablet  Commonly known as:  PRINIVIL,ZESTRIL     sodium bicarbonate 650 MG tablet            Indwelling Lines/Drains at time of discharge:   Lines/Drains/Airways     None                 Time spent on the discharge of patient: 33 minutes  Patient was seen and examined on the date of discharge and determined to be suitable for discharge.         Stephanie Linton MD  Department of Hospital Medicine  Formerly [facility]

## 2019-12-18 NOTE — NURSING
Pt has discharge orders, awaiting  to provide information on calling report to [facility]

## 2019-12-18 NOTE — ASSESSMENT & PLAN NOTE
Admitted with hyponatremia with dehydration and acute kidney injury.  Torsemide was held.  Plan is to resume on 12/19.

## 2019-12-18 NOTE — PT/OT/SLP PROGRESS
Occupational Therapy   Treatment    Name: Katy Melchor  MRN: [id number]  Admitting Diagnosis:  <principal problem not specified>       Recommendations:     Discharge Recommendations: (back to nursing [facility])  Discharge Equipment Recommendations:  none  Barriers to discharge:  None    Assessment:     Katy Melchor is a 79 y.o. female with a medical diagnosis of <principal problem not specified>.  Pt agreeable to OT therapy session this AM. Performance deficits affecting function are weakness, impaired endurance, impaired self care skills, impaired functional mobilty, gait instability, decreased safety awareness. Pt continues to require verbal cues for sequencing during grooming tasks.     Rehab Prognosis:  Fair; patient would benefit from acute skilled OT services to address these deficits and reach maximum level of function.       Plan:     Patient to be seen 5 x/week to address the above listed problems via self-care/home management, therapeutic activities, therapeutic exercises  · Plan of Care Expires: 01/16/20  · Plan of Care Reviewed with: patient, daughter    Subjective     Pain/Comfort:  · Pain Rating 1: 0/10    Objective:     Communicated with: nursing prior to session.  Patient found up in chair with peripheral IV, telemetry(daughter present) upon OT entry to room.    General Precautions: Standard, fall   Orthopedic Precautions:N/A   Braces: N/A     Occupational Performance:     Functional Mobility/Transfers:  · Patient completed Sit <> Stand Transfer with minimum assistance  with  rolling walker   · Functional Mobility: pt amb from chair <> sink with CGA with RW, with cues for walker management     Activities of Daily Living:  · Grooming: contact guard assistance for safety while patient standing at sink; Verbal cues needed for sequencing during task.    Treatment & Education:  Pt educated on safety during ADLs, during functional mobility, and safety with use of walker.     Patient left up in chair with  all lines intact, call button in reach and daughter  presentEducation:      GOALS:   Multidisciplinary Problems     Occupational Therapy Goals        Problem: Occupational Therapy Goal    Goal Priority Disciplines Outcome Interventions   Occupational Therapy Goal     OT, PT/OT Ongoing, Progressing    Description:  Goals to be met by: discharge    Patient will increase functional independence with ADLs by performing:    LE Dressing with Contact Guard Assistance.  Grooming while seated with Stand-by Assistance.  Toileting from bedside commode with Moderate Assistance for hygiene and clothing management.   Toilet transfer to bedside commode with Contact Guard Assistance.                      Time Tracking:     OT Date of Treatment: 12/18/19  OT Start Time: 0933  OT Stop Time: 0945  OT Total Time (min): 12 min    Billable Minutes:Self Care/Home Management 12    Zoey Reilly OT  12/18/2019

## 2019-12-18 NOTE — PROGRESS NOTES
INPATIENT NEPHROLOGY PROGRESS NOTE  [facility] NEPHROLOGY    Patient Name: Katy Melchor  Date: 12/18/2019    Reason for consultation: NIKKI    Chief Complaint:   Chief Complaint   Patient presents with    Weakness     Times several days       History of Present Illness:  Patient is a 79-year-old female with past medical history of CHF, CAD (recent NSTEMI), paroxysmal atrial fibrillation, dementia, diabetes, high cholesterol, hypertension, depression, hypothyroidism and CKD presents to the ER from [facility] for generalized weakness. The daughters state that the pt has also had a cough for approx 3 weeks that is nonproductive and is currently being treated for a UTI with Bactrim. We are consulted for NIKKI.    · Interval History/Subjective:    - sitting in chair, good appetite, feels better, no cp/dyspnea, trace edema  12/18  K+ low, repletion has been ordered.  Pt being discharged to home today, OK from renal standpoint once K+ repletion given.    · Review of Systems: All 14 systems reviewed and negative, except as noted in HPI    Plan of Care:    Assessment:  CiriloCKI, baseline stage III CKD  Drug induced NIKKI/UTI  Volume depletion  HTN  Hypokalemia/Contraction alkalosis  Anemia of CKD     Plan:     - Renal function is improved. No NSAIDs or IV contrast.   - Hold further doses of bactrim. Treated with CFTX.   - BP is better. She has mild edema. Will monitor. Hold lisinopril and torsemide today.   - Given KCl repletion. Alkalosis is worse. Hold sodium bicarbonate.  - Hb is stable.     Thank you for allowing us to participate in this patient's care. We will continue to follow.    Medications:  No current facility-administered medications on file prior to encounter.      Current Outpatient Medications on File Prior to Encounter   Medication Sig Dispense Refill    albuterol (PROVENTIL) 2.5 mg /3 mL (0.083 %) nebulizer solution Inhale 1 vial into the lungs every 6 (six) hours as needed.       amino acids/protein hydrolys  (PRO-STAT SUGAR FREE ORAL) Take 30 mLs by mouth once daily.      aspirin (ECOTRIN) 81 MG EC tablet Take 1 tablet (81 mg total) by mouth once daily. 30 tablet 2    atorvastatin (LIPITOR) 20 MG tablet Take 20 mg by mouth every evening.       bacitracin-polymyxin b (POLYSPORIN) ophthalmic ointment Place 1 application into both eyes every evening. APPLY A TINY STRIP TO FINGERTIP THEN RUB ALONG EYELASH MARGIN OF BOTH EYES      calcium carbonate/vitamin D3 (CALTRATE WITH VITAMIN D3 ORAL) Take 1 tablet by mouth once daily.       clopidogrel (PLAVIX) 75 mg tablet Take 1 tablet (75 mg total) by mouth once daily. 30 tablet 2    coenzyme Q10 (COQ-10) 100 mg capsule Take 200 mg by mouth once daily.      cyanocobalamin 1,000 mcg/mL injection Inject 1,000 mcg into the muscle every 30 days.       donepezil (ARICEPT) 10 MG tablet Take 10 mg by mouth every evening.      escitalopram oxalate (LEXAPRO) 20 MG tablet Take 20 mg by mouth every evening.       fluticasone propionate (FLONASE) 50 mcg/actuation nasal spray 1 spray by Each Nostril route daily as needed for Rhinitis.      fluticasone-umeclidin-vilanter (TRELEGY ELLIPTA) 100-62.5-25 mcg DsDv Inhale 1 puff into the lungs once daily.      guaifenesin 100 mg/5 ml (ROBITUSSIN) 100 mg/5 mL syrup Take 200 mg by mouth every 4 (four) hours as needed for Cough.       levalbuterol (XOPENEX HFA) 45 mcg/actuation inhaler Inhale 2 puffs into the lungs 2 (two) times daily. Rescue      levothyroxine (SYNTHROID) 125 MCG tablet Take 125 mcg by mouth before breakfast.       loratadine (CLARITIN) 10 mg tablet Take 10 mg by mouth daily as needed for Allergies.       memantine (NAMENDA) 5 MG Tab Take 5 mg by mouth 2 (two) times daily.       multivitamin Tab Take 1 tablet by mouth once daily.      polyvinyl alcohol-povidon,PF, (REFRESH CLASSIC, PF,) 1.4-0.6 % Dpet Place 1 drop into both eyes 4 (four) times daily.      potassium chloride (KLOR-CON M20 ORAL) Take 20 mEq by mouth  once daily.       SITagliptin (JANUVIA) 50 MG Tab Take 100 mg by mouth once daily.      sotalol (BETAPACE) 80 MG tablet Take 40 mg by mouth 2 (two) times daily.      torsemide (DEMADEX) 20 MG Tab Take 30 mg by mouth once daily.       [DISCONTINUED] lisinopril (PRINIVIL,ZESTRIL) 2.5 MG tablet Take 1 tablet (2.5 mg total) by mouth once daily. 30 tablet 2    [DISCONTINUED] sodium bicarbonate 650 MG tablet Take 650 mg by mouth 3 (three) times daily.       Scheduled Meds:   aspirin  81 mg Oral Daily    atorvastatin  20 mg Oral QHS    clopidogrel  75 mg Oral Daily    donepezil  10 mg Oral QHS    enoxaparin  40 mg Subcutaneous Daily    escitalopram oxalate  20 mg Oral QHS    levothyroxine  125 mcg Oral Before breakfast    memantine  5 mg Oral BID    potassium chloride  20 mEq Oral Daily    sotalol  40 mg Oral Daily     Continuous Infusions:  PRN Meds:.acetaminophen, benzonatate, dextrose 50%, dextrose 50%, glucagon (human recombinant), glucose, glucose, insulin aspart U-100, magnesium oxide, magnesium sulfate IVPB, magnesium sulfate IVPB, magnesium sulfate IVPB, magnesium sulfate IVPB, ondansetron, potassium chloride in water, potassium chloride in water, potassium chloride in water, potassium chloride in water, potassium chloride, potassium chloride, potassium chloride, potassium chloride, sodium chloride 0.9%    Allergies:  Patient has no known allergies.    Vital Signs:  Temp Readings from Last 3 Encounters:   12/18/19 98.3 °F (36.8 °C) (Oral)   10/08/19 99.1 °F (37.3 °C) (Oral)   10/05/19 98 °F (36.7 °C) (Oral)       Pulse Readings from Last 3 Encounters:   12/18/19 60   10/08/19 64   10/05/19 66       BP Readings from Last 3 Encounters:   12/18/19 128/68   10/08/19 (!) 114/56   10/05/19 (!) 132/57       Weight:  Wt Readings from Last 3 Encounters:   12/16/19 102.8 kg (226 lb 10.1 oz)   10/07/19 112.9 kg (248 lb 14.4 oz)   10/06/19 109.5 kg (241 lb 6.5 oz)       INS/OUTS:  I/O last 3 completed  shifts:  In: -   Out: 230 [Urine:230]  No intake/output data recorded.    Physical Exam:  Constitutional: nad, aao x 3  Heart: rrr, no m/r/g, wwp, trace edema  Lungs: ctab, no w/r/r/c, no lb  Abdomen: s/nt/nd, +BS    Results:  Lab Results   Component Value Date     (L) 12/18/2019    K 3.4 (L) 12/18/2019    CL 99 12/18/2019    CO2 29 12/18/2019    BUN 23 12/18/2019    CREATININE 1.0 12/18/2019    CALCIUM 8.7 12/18/2019    ANIONGAP 5 (L) 12/18/2019    ESTGFRAFRICA >60.0 12/18/2019    EGFRNONAA 54.1 (A) 12/18/2019       Lab Results   Component Value Date    CALCIUM 8.7 12/18/2019    PHOS 3.0 12/18/2019       Recent Labs   Lab 12/18/19  0447   WBC 9.73   RBC 3.66*   HGB 10.6*   HCT 33.0*      MCV 90   MCH 29.0   MCHC 32.1       I have personally reviewed pertinent radiological imaging and reports.    I have spent > 35 minutes providing care for this patient for the above diagnoses. These services have included chart/data/imaging review, evaluation, exam, formulation of plan, , note preparation, and discussions with staff involved in this patient's care.    Selvin Pratt MD MPH  [facility] [address] [address]  [phone] (p)  [phone] (f)

## 2019-12-18 NOTE — PT/OT/SLP DISCHARGE
Occupational Therapy Discharge Summary    Katy Melchor  MRN: [id number]   Principal Problem: NIKKI (acute kidney injury)      Patient Discharged from acute Occupational Therapy on 12/18/2019.  Please refer to prior OT note dated 12/18/2019 for functional status.    Assessment:      Patient appropriate for care in another setting.    Objective:     GOALS:   Multidisciplinary Problems     Occupational Therapy Goals        Problem: Occupational Therapy Goal    Goal Priority Disciplines Outcome Interventions   Occupational Therapy Goal     OT, PT/OT Ongoing, Progressing    Description:  Goals to be met by: discharge    Patient will increase functional independence with ADLs by performing:    LE Dressing with Contact Guard Assistance.  Grooming while seated with Stand-by Assistance.  Toileting from bedside commode with Moderate Assistance for hygiene and clothing management.   Toilet transfer to bedside commode with Contact Guard Assistance.                      Reasons for Discontinuation of Therapy Services  Transfer to alternate level of care.      Plan:     Patient Discharged to: RITESH Reilly, OT  12/18/2019

## 2019-12-18 NOTE — PT/OT/SLP PROGRESS
"Physical Therapy Treatment    Patient Name:  Katy Melchor   MRN:  [id number]    Recommendations:     Discharge Recommendations:  (Return to NH)   Discharge Equipment Recommendations: none   Barriers to discharge: None    Assessment:     Katy Melchor is a 79 y.o. female admitted with a medical diagnosis of NIKKI (acute kidney injury).  She presents with the following impairments/functional limitations:  weakness, impaired endurance, impaired self care skills, impaired functional mobilty, gait instability, decreased safety awareness. Pt progressing with gait and mobility today, increasing gait distance and requiring less assistance. Pt noted without O2 upon PTA entering and pt's sats noted to be 93%. Pts O2 sats following gait training noted to be 89-90% with no reports of SOB. Pt's O2 placed back on 3L for pt to recover following gait training. Pts daughter present and supportive throughout treatment. Continue with PT and POC.    Rehab Prognosis: Good; patient would benefit from acute skilled PT services to address these deficits and reach maximum level of function.    Recent Surgery: * No surgery found *      Plan:     During this hospitalization, patient to be seen daily to address the identified rehab impairments via gait training, therapeutic activities, therapeutic exercises and progress toward the following goals:    · Plan of Care Expires:  01/14/20    Subjective     Chief Complaint: "cold"  Patient/Family Comments/goals:   Pain/Comfort:  · Pain Rating 1: 0/10  · Pain Rating Post-Intervention 1: 0/10      Objective:     Communicated with nursing prior to session.  Patient found up in chair with peripheral IV, telemetry upon PT entry to room.     General Precautions: Standard, fall   Orthopedic Precautions:N/A   Braces:       Functional Mobility:  · Transfers:     · Sit to Stand:  minimum assistance with rolling walker  · Gait: 80ft with RW and CGA with verbal cueing for pt to look up to facilitate upright " posture      AM-PAC 6 CLICK MOBILITY          Therapeutic Activities and Exercises:  sit <> stands; transfer training; gait training    Patient left up in chair with all lines intact, call button in reach and pt's daughter present..    GOALS:   Multidisciplinary Problems     Physical Therapy Goals        Problem: Physical Therapy Goal    Goal Priority Disciplines Outcome Goal Variances Interventions   Physical Therapy Goal     PT, PT/OT Ongoing, Progressing     Description:  Goals to be met by: discharge      Patient will increase functional independence with mobility by performing:     . Supine to sit with Stand-by Assistance. Established 12/15- Max A  . Sit to supine with Stand-by Assistance. Established 12/15 NT  . Sit to stand transfer with Stand-by Assistance. Established 12/15- Min A RW  . Bed to chair transfer with Supervision using Rolling Walker. Established 12/15-  mod A stand  pivot  . Gait  x 50  feet with Supervision using Rolling Walker. . Established 12/15- 50 ft RW min A                          Time Tracking:     PT Received On: 12/18/19  PT Start Time: 1024     PT Stop Time: 1047  PT Total Time (min): 23 min     Billable Minutes: Gait Training 13 and Therapeutic Activity 10    Treatment Type: Treatment  PT/PTA: PTA     PTA Visit Number: 3     Silvia Gilman, PTA  12/18/2019

## 2019-12-18 NOTE — ASSESSMENT & PLAN NOTE
Urine culture 12/04 Citrobacter koseri.  She was on Bactrim prior to admission.  Now also completed 3 days of Rocephin.

## 2019-12-26 ENCOUNTER — LAB VISIT (OUTPATIENT)
Dept: LAB | Facility: HOSPITAL | Age: 79
End: 2019-12-26
Attending: NURSE PRACTITIONER
Payer: MEDICARE

## 2019-12-26 DIAGNOSIS — N18.9 CHRONIC KIDNEY DISEASE, UNSPECIFIED: ICD-10-CM

## 2019-12-26 DIAGNOSIS — E87.6 HYPOPOTASSEMIA: ICD-10-CM

## 2019-12-26 DIAGNOSIS — I50.9 HEART FAILURE, UNSPECIFIED: Primary | ICD-10-CM

## 2019-12-26 LAB
ANION GAP SERPL CALC-SCNC: 11 MMOL/L (ref 8–16)
BUN SERPL-MCNC: 23 MG/DL (ref 8–23)
CALCIUM SERPL-MCNC: 9.1 MG/DL (ref 8.7–10.5)
CHLORIDE SERPL-SCNC: 94 MMOL/L (ref 95–110)
CO2 SERPL-SCNC: 30 MMOL/L (ref 23–29)
CREAT SERPL-MCNC: 1.2 MG/DL (ref 0.5–1.4)
EST. GFR  (AFRICAN AMERICAN): 50 ML/MIN/1.73 M^2
EST. GFR  (NON AFRICAN AMERICAN): 43.4 ML/MIN/1.73 M^2
GLUCOSE SERPL-MCNC: 119 MG/DL (ref 70–110)
POTASSIUM SERPL-SCNC: 2.3 MMOL/L (ref 3.5–5.1)
SODIUM SERPL-SCNC: 135 MMOL/L (ref 136–145)

## 2019-12-26 PROCEDURE — 36415 COLL VENOUS BLD VENIPUNCTURE: CPT

## 2019-12-26 PROCEDURE — 80048 BASIC METABOLIC PNL TOTAL CA: CPT

## 2019-12-31 ENCOUNTER — LAB VISIT (OUTPATIENT)
Dept: LAB | Facility: HOSPITAL | Age: 79
End: 2019-12-31
Attending: INTERNAL MEDICINE
Payer: MEDICARE

## 2019-12-31 DIAGNOSIS — E87.6 HYPOPOTASSEMIA: Primary | ICD-10-CM

## 2019-12-31 LAB
ANION GAP SERPL CALC-SCNC: 13 MMOL/L (ref 8–16)
BUN SERPL-MCNC: 35 MG/DL (ref 8–23)
CALCIUM SERPL-MCNC: 9.1 MG/DL (ref 8.7–10.5)
CHLORIDE SERPL-SCNC: 84 MMOL/L (ref 95–110)
CO2 SERPL-SCNC: 35 MMOL/L (ref 23–29)
CREAT SERPL-MCNC: 1.7 MG/DL (ref 0.5–1.4)
EST. GFR  (AFRICAN AMERICAN): 32.8 ML/MIN/1.73 M^2
EST. GFR  (NON AFRICAN AMERICAN): 28.5 ML/MIN/1.73 M^2
GLUCOSE SERPL-MCNC: 145 MG/DL (ref 70–110)
PHOSPHATE SERPL-MCNC: 3.7 MG/DL (ref 2.7–4.5)
POTASSIUM SERPL-SCNC: 1.9 MMOL/L (ref 3.5–5.1)
POTASSIUM SERPL-SCNC: 1.9 MMOL/L (ref 3.5–5.1)
SODIUM SERPL-SCNC: 132 MMOL/L (ref 136–145)

## 2019-12-31 PROCEDURE — 84100 ASSAY OF PHOSPHORUS: CPT

## 2019-12-31 PROCEDURE — 80048 BASIC METABOLIC PNL TOTAL CA: CPT

## 2019-12-31 PROCEDURE — 36415 COLL VENOUS BLD VENIPUNCTURE: CPT

## 2020-01-03 ENCOUNTER — LAB VISIT (OUTPATIENT)
Dept: LAB | Facility: HOSPITAL | Age: 80
End: 2020-01-03
Attending: NURSE PRACTITIONER
Payer: MEDICARE

## 2020-01-03 DIAGNOSIS — E87.6 HYPOPOTASSEMIA: Primary | ICD-10-CM

## 2020-01-03 LAB — POTASSIUM SERPL-SCNC: 3.1 MMOL/L (ref 3.5–5.1)

## 2020-01-03 PROCEDURE — 36415 COLL VENOUS BLD VENIPUNCTURE: CPT

## 2020-01-03 PROCEDURE — 84132 ASSAY OF SERUM POTASSIUM: CPT

## 2020-01-05 ENCOUNTER — APPOINTMENT (OUTPATIENT)
Dept: LAB | Facility: HOSPITAL | Age: 80
End: 2020-01-05
Attending: INTERNAL MEDICINE
Payer: MEDICARE

## 2020-01-05 DIAGNOSIS — N39.0 URINARY TRACT INFECTION, SITE NOT SPECIFIED: Primary | ICD-10-CM

## 2020-01-05 PROCEDURE — 87088 URINE BACTERIA CULTURE: CPT

## 2020-01-05 PROCEDURE — 87077 CULTURE AEROBIC IDENTIFY: CPT

## 2020-01-05 PROCEDURE — 87186 SC STD MICRODIL/AGAR DIL: CPT

## 2020-01-05 PROCEDURE — 87086 URINE CULTURE/COLONY COUNT: CPT

## 2020-01-05 PROCEDURE — 81003 URINALYSIS AUTO W/O SCOPE: CPT

## 2020-01-07 ENCOUNTER — LAB VISIT (OUTPATIENT)
Dept: LAB | Facility: HOSPITAL | Age: 80
End: 2020-01-07
Attending: INTERNAL MEDICINE
Payer: MEDICARE

## 2020-01-07 DIAGNOSIS — E11.40 DIABETIC NEUROPATHY: ICD-10-CM

## 2020-01-07 DIAGNOSIS — E11.9 DIABETES MELLITUS WITHOUT COMPLICATION: Primary | ICD-10-CM

## 2020-01-07 DIAGNOSIS — J44.89 OBSTRUCTIVE CHRONIC BRONCHITIS WITHOUT EXACERBATION: ICD-10-CM

## 2020-01-07 DIAGNOSIS — I10 ESSENTIAL HYPERTENSION, MALIGNANT: ICD-10-CM

## 2020-01-07 DIAGNOSIS — E78.5 HYPERLIPEMIA: ICD-10-CM

## 2020-01-07 LAB
ANION GAP SERPL CALC-SCNC: 21 MMOL/L (ref 8–16)
BNP SERPL-MCNC: 153 PG/ML (ref 0–99)
BUN SERPL-MCNC: 43 MG/DL (ref 8–23)
CALCIUM SERPL-MCNC: 9.5 MG/DL (ref 8.7–10.5)
CHLORIDE SERPL-SCNC: 85 MMOL/L (ref 95–110)
CO2 SERPL-SCNC: 28 MMOL/L (ref 23–29)
CREAT SERPL-MCNC: 1.7 MG/DL (ref 0.5–1.4)
EST. GFR  (AFRICAN AMERICAN): 32.8 ML/MIN/1.73 M^2
EST. GFR  (NON AFRICAN AMERICAN): 28.5 ML/MIN/1.73 M^2
GLUCOSE SERPL-MCNC: 119 MG/DL (ref 70–110)
POTASSIUM SERPL-SCNC: 2.8 MMOL/L (ref 3.5–5.1)
SODIUM SERPL-SCNC: 134 MMOL/L (ref 136–145)

## 2020-01-07 PROCEDURE — 83880 ASSAY OF NATRIURETIC PEPTIDE: CPT

## 2020-01-07 PROCEDURE — 36415 COLL VENOUS BLD VENIPUNCTURE: CPT

## 2020-01-07 PROCEDURE — 80048 BASIC METABOLIC PNL TOTAL CA: CPT

## 2020-01-08 LAB — BACTERIA UR CULT: ABNORMAL

## 2020-01-21 ENCOUNTER — LAB VISIT (OUTPATIENT)
Dept: LAB | Facility: HOSPITAL | Age: 80
End: 2020-01-21
Attending: INTERNAL MEDICINE
Payer: MEDICARE

## 2020-01-21 DIAGNOSIS — E11.9 DIABETES MELLITUS WITHOUT COMPLICATION: Primary | ICD-10-CM

## 2020-01-21 LAB
ANION GAP SERPL CALC-SCNC: 13 MMOL/L (ref 8–16)
BNP SERPL-MCNC: 90 PG/ML (ref 0–99)
BUN SERPL-MCNC: 33 MG/DL (ref 8–23)
CALCIUM SERPL-MCNC: 9 MG/DL (ref 8.7–10.5)
CHLORIDE SERPL-SCNC: 87 MMOL/L (ref 95–110)
CO2 SERPL-SCNC: 36 MMOL/L (ref 23–29)
CREAT SERPL-MCNC: 1.6 MG/DL (ref 0.5–1.4)
EST. GFR  (AFRICAN AMERICAN): 35.3 ML/MIN/1.73 M^2
EST. GFR  (NON AFRICAN AMERICAN): 30.6 ML/MIN/1.73 M^2
GLUCOSE SERPL-MCNC: 114 MG/DL (ref 70–110)
POTASSIUM SERPL-SCNC: 2.5 MMOL/L (ref 3.5–5.1)
SODIUM SERPL-SCNC: 136 MMOL/L (ref 136–145)

## 2020-01-21 PROCEDURE — 80048 BASIC METABOLIC PNL TOTAL CA: CPT

## 2020-01-21 PROCEDURE — 83880 ASSAY OF NATRIURETIC PEPTIDE: CPT

## 2020-01-21 PROCEDURE — 36415 COLL VENOUS BLD VENIPUNCTURE: CPT

## 2020-01-22 ENCOUNTER — LAB VISIT (OUTPATIENT)
Dept: LAB | Facility: HOSPITAL | Age: 80
End: 2020-01-22
Attending: INTERNAL MEDICINE
Payer: MEDICARE

## 2020-01-22 DIAGNOSIS — I50.9 HEART FAILURE, UNSPECIFIED: Primary | ICD-10-CM

## 2020-01-22 LAB — POTASSIUM SERPL-SCNC: 2.9 MMOL/L (ref 3.5–5.1)

## 2020-01-22 PROCEDURE — 36415 COLL VENOUS BLD VENIPUNCTURE: CPT

## 2020-01-22 PROCEDURE — 84132 ASSAY OF SERUM POTASSIUM: CPT

## 2020-01-23 ENCOUNTER — LAB VISIT (OUTPATIENT)
Dept: LAB | Facility: HOSPITAL | Age: 80
End: 2020-01-23
Attending: INTERNAL MEDICINE
Payer: MEDICARE

## 2020-01-23 DIAGNOSIS — I50.9 HEART FAILURE, UNSPECIFIED: Primary | ICD-10-CM

## 2020-01-23 LAB — POTASSIUM SERPL-SCNC: 2.8 MMOL/L (ref 3.5–5.1)

## 2020-01-23 PROCEDURE — 84132 ASSAY OF SERUM POTASSIUM: CPT

## 2020-01-23 PROCEDURE — 36415 COLL VENOUS BLD VENIPUNCTURE: CPT

## 2020-01-24 ENCOUNTER — LAB VISIT (OUTPATIENT)
Dept: LAB | Facility: HOSPITAL | Age: 80
End: 2020-01-24
Attending: INTERNAL MEDICINE
Payer: MEDICARE

## 2020-01-24 DIAGNOSIS — E87.6 HYPOPOTASSEMIA: Primary | ICD-10-CM

## 2020-01-24 LAB — POTASSIUM SERPL-SCNC: 3.3 MMOL/L (ref 3.5–5.1)

## 2020-01-24 PROCEDURE — 84132 ASSAY OF SERUM POTASSIUM: CPT

## 2020-01-24 PROCEDURE — 36415 COLL VENOUS BLD VENIPUNCTURE: CPT

## 2020-01-28 ENCOUNTER — LAB VISIT (OUTPATIENT)
Dept: LAB | Facility: HOSPITAL | Age: 80
End: 2020-01-28
Attending: INTERNAL MEDICINE
Payer: MEDICARE

## 2020-01-28 DIAGNOSIS — I50.9 HEART FAILURE, UNSPECIFIED: Primary | ICD-10-CM

## 2020-01-28 LAB
ANION GAP SERPL CALC-SCNC: 8 MMOL/L (ref 8–16)
BUN SERPL-MCNC: 20 MG/DL (ref 8–23)
CALCIUM SERPL-MCNC: 8.7 MG/DL (ref 8.7–10.5)
CHLORIDE SERPL-SCNC: 101 MMOL/L (ref 95–110)
CO2 SERPL-SCNC: 29 MMOL/L (ref 23–29)
CREAT SERPL-MCNC: 1.5 MG/DL (ref 0.5–1.4)
EST. GFR  (AFRICAN AMERICAN): 38.2 ML/MIN/1.73 M^2
EST. GFR  (NON AFRICAN AMERICAN): 33.1 ML/MIN/1.73 M^2
GLUCOSE SERPL-MCNC: 120 MG/DL (ref 70–110)
POTASSIUM SERPL-SCNC: 3.7 MMOL/L (ref 3.5–5.1)
SODIUM SERPL-SCNC: 138 MMOL/L (ref 136–145)

## 2020-01-28 PROCEDURE — 36415 COLL VENOUS BLD VENIPUNCTURE: CPT

## 2020-01-28 PROCEDURE — 80048 BASIC METABOLIC PNL TOTAL CA: CPT

## 2020-02-17 ENCOUNTER — APPOINTMENT (OUTPATIENT)
Dept: LAB | Facility: HOSPITAL | Age: 80
End: 2020-02-17
Attending: INTERNAL MEDICINE
Payer: MEDICARE

## 2020-02-17 DIAGNOSIS — N39.0 URINARY TRACT INFECTION, SITE NOT SPECIFIED: Primary | ICD-10-CM

## 2020-02-17 LAB
BACTERIA #/AREA URNS HPF: NORMAL /HPF
BILIRUB UR QL STRIP: NEGATIVE
CLARITY UR: CLEAR
COLOR UR: YELLOW
GLUCOSE UR QL STRIP: NEGATIVE
HGB UR QL STRIP: NEGATIVE
KETONES UR QL STRIP: NEGATIVE
LEUKOCYTE ESTERASE UR QL STRIP: ABNORMAL
MICROSCOPIC COMMENT: NORMAL
NITRITE UR QL STRIP: NEGATIVE
PH UR STRIP: 6 [PH] (ref 5–8)
PROT UR QL STRIP: NEGATIVE
RBC #/AREA URNS HPF: 1 /HPF (ref 0–4)
SP GR UR STRIP: 1.01 (ref 1–1.03)
SQUAMOUS #/AREA URNS HPF: 1 /HPF
URN SPEC COLLECT METH UR: ABNORMAL
UROBILINOGEN UR STRIP-ACNC: NEGATIVE EU/DL
WBC #/AREA URNS HPF: 3 /HPF (ref 0–5)

## 2020-02-17 PROCEDURE — 87186 SC STD MICRODIL/AGAR DIL: CPT

## 2020-02-17 PROCEDURE — 87086 URINE CULTURE/COLONY COUNT: CPT

## 2020-02-17 PROCEDURE — 81000 URINALYSIS NONAUTO W/SCOPE: CPT

## 2020-02-17 PROCEDURE — 87088 URINE BACTERIA CULTURE: CPT

## 2020-02-17 PROCEDURE — 87077 CULTURE AEROBIC IDENTIFY: CPT

## 2020-02-21 LAB — BACTERIA UR CULT: ABNORMAL

## 2020-03-17 ENCOUNTER — LAB VISIT (OUTPATIENT)
Dept: LAB | Facility: HOSPITAL | Age: 80
End: 2020-03-17
Attending: NURSE PRACTITIONER
Payer: MEDICARE

## 2020-03-17 DIAGNOSIS — N18.9 CHRONIC KIDNEY DISEASE, UNSPECIFIED: Primary | ICD-10-CM

## 2020-03-17 LAB
BUN SERPL-MCNC: 16 MG/DL (ref 8–23)
CREAT SERPL-MCNC: 1.4 MG/DL (ref 0.5–1.4)
EST. GFR  (AFRICAN AMERICAN): 41.5 ML/MIN/1.73 M^2
EST. GFR  (NON AFRICAN AMERICAN): 36 ML/MIN/1.73 M^2

## 2020-03-17 PROCEDURE — 36415 COLL VENOUS BLD VENIPUNCTURE: CPT

## 2020-03-17 PROCEDURE — 82565 ASSAY OF CREATININE: CPT

## 2020-03-17 PROCEDURE — 84520 ASSAY OF UREA NITROGEN: CPT

## 2020-04-07 ENCOUNTER — LAB VISIT (OUTPATIENT)
Dept: LAB | Facility: HOSPITAL | Age: 80
End: 2020-04-07
Attending: NURSE PRACTITIONER
Payer: MEDICARE

## 2020-04-07 DIAGNOSIS — E03.1 CONGENITAL HYPOTHYROIDISM: Primary | ICD-10-CM

## 2020-04-07 LAB
T4 FREE SERPL-MCNC: 1.19 NG/DL (ref 0.71–1.51)
TSH SERPL DL<=0.005 MIU/L-ACNC: 7.58 UIU/ML (ref 0.34–5.6)

## 2020-04-07 PROCEDURE — 84439 ASSAY OF FREE THYROXINE: CPT

## 2020-04-07 PROCEDURE — 84443 ASSAY THYROID STIM HORMONE: CPT

## 2020-04-07 PROCEDURE — 36415 COLL VENOUS BLD VENIPUNCTURE: CPT

## 2020-05-14 ENCOUNTER — LAB VISIT (OUTPATIENT)
Dept: LAB | Facility: HOSPITAL | Age: 80
End: 2020-05-14
Attending: INTERNAL MEDICINE
Payer: MEDICARE

## 2020-05-14 DIAGNOSIS — E03.9 MYXEDEMA HEART DISEASE: ICD-10-CM

## 2020-05-14 DIAGNOSIS — I51.9 MYXEDEMA HEART DISEASE: ICD-10-CM

## 2020-05-14 DIAGNOSIS — E11.9 DIABETES MELLITUS WITHOUT COMPLICATION: Primary | ICD-10-CM

## 2020-05-14 LAB
ALBUMIN SERPL BCP-MCNC: 2.9 G/DL (ref 3.5–5.2)
ALP SERPL-CCNC: 77 U/L (ref 55–135)
ALT SERPL W/O P-5'-P-CCNC: 14 U/L (ref 10–44)
ANION GAP SERPL CALC-SCNC: 9 MMOL/L (ref 8–16)
AST SERPL-CCNC: 15 U/L (ref 10–40)
BASOPHILS # BLD AUTO: 0.06 K/UL (ref 0–0.2)
BASOPHILS NFR BLD: 0.7 % (ref 0–1.9)
BILIRUB SERPL-MCNC: 0.4 MG/DL (ref 0.1–1)
BUN SERPL-MCNC: 14 MG/DL (ref 8–23)
CALCIUM SERPL-MCNC: 8.8 MG/DL (ref 8.7–10.5)
CHLORIDE SERPL-SCNC: 105 MMOL/L (ref 95–110)
CO2 SERPL-SCNC: 23 MMOL/L (ref 23–29)
CREAT SERPL-MCNC: 1.4 MG/DL (ref 0.5–1.4)
DIFFERENTIAL METHOD: ABNORMAL
EOSINOPHIL # BLD AUTO: 0.3 K/UL (ref 0–0.5)
EOSINOPHIL NFR BLD: 3.2 % (ref 0–8)
ERYTHROCYTE [DISTWIDTH] IN BLOOD BY AUTOMATED COUNT: 13.4 % (ref 11.5–14.5)
EST. GFR  (AFRICAN AMERICAN): 41.2 ML/MIN/1.73 M^2
EST. GFR  (NON AFRICAN AMERICAN): 35.8 ML/MIN/1.73 M^2
ESTIMATED AVG GLUCOSE: 134 MG/DL (ref 68–131)
GLUCOSE SERPL-MCNC: 109 MG/DL (ref 70–110)
HBA1C MFR BLD HPLC: 6.3 % (ref 4.5–6.2)
HCT VFR BLD AUTO: 38.2 % (ref 37–48.5)
HGB BLD-MCNC: 12.2 G/DL (ref 12–16)
IMM GRANULOCYTES # BLD AUTO: 0.03 K/UL (ref 0–0.04)
IMM GRANULOCYTES NFR BLD AUTO: 0.3 % (ref 0–0.5)
LYMPHOCYTES # BLD AUTO: 3 K/UL (ref 1–4.8)
LYMPHOCYTES NFR BLD: 33.2 % (ref 18–48)
MCH RBC QN AUTO: 29.2 PG (ref 27–31)
MCHC RBC AUTO-ENTMCNC: 31.9 G/DL (ref 32–36)
MCV RBC AUTO: 91 FL (ref 82–98)
MONOCYTES # BLD AUTO: 0.7 K/UL (ref 0.3–1)
MONOCYTES NFR BLD: 7.9 % (ref 4–15)
NEUTROPHILS # BLD AUTO: 5 K/UL (ref 1.8–7.7)
NEUTROPHILS NFR BLD: 54.7 % (ref 38–73)
NRBC BLD-RTO: 0 /100 WBC
PLATELET # BLD AUTO: 257 K/UL (ref 150–350)
PMV BLD AUTO: 10.3 FL (ref 9.2–12.9)
POTASSIUM SERPL-SCNC: 3.8 MMOL/L (ref 3.5–5.1)
PROT SERPL-MCNC: 6.2 G/DL (ref 6–8.4)
RBC # BLD AUTO: 4.18 M/UL (ref 4–5.4)
SODIUM SERPL-SCNC: 137 MMOL/L (ref 136–145)
T4 FREE SERPL-MCNC: 1.91 NG/DL (ref 0.71–1.51)
TSH SERPL DL<=0.005 MIU/L-ACNC: 0.07 UIU/ML (ref 0.34–5.6)
WBC # BLD AUTO: 9.15 K/UL (ref 3.9–12.7)

## 2020-05-14 PROCEDURE — 84439 ASSAY OF FREE THYROXINE: CPT

## 2020-05-14 PROCEDURE — 80053 COMPREHEN METABOLIC PANEL: CPT

## 2020-05-14 PROCEDURE — 84443 ASSAY THYROID STIM HORMONE: CPT

## 2020-05-14 PROCEDURE — 83036 HEMOGLOBIN GLYCOSYLATED A1C: CPT

## 2020-05-14 PROCEDURE — 36415 COLL VENOUS BLD VENIPUNCTURE: CPT

## 2020-05-14 PROCEDURE — 85025 COMPLETE CBC W/AUTO DIFF WBC: CPT

## 2020-06-11 ENCOUNTER — LAB VISIT (OUTPATIENT)
Dept: LAB | Facility: HOSPITAL | Age: 80
End: 2020-06-11
Attending: INTERNAL MEDICINE
Payer: MEDICARE

## 2020-06-11 DIAGNOSIS — I51.9 MYXEDEMA HEART DISEASE: Primary | ICD-10-CM

## 2020-06-11 DIAGNOSIS — E03.9 MYXEDEMA HEART DISEASE: Primary | ICD-10-CM

## 2020-06-11 LAB
T4 FREE SERPL-MCNC: 1.44 NG/DL (ref 0.71–1.51)
TSH SERPL DL<=0.005 MIU/L-ACNC: 0.19 UIU/ML (ref 0.34–5.6)

## 2020-06-11 PROCEDURE — 36415 COLL VENOUS BLD VENIPUNCTURE: CPT

## 2020-06-11 PROCEDURE — 84443 ASSAY THYROID STIM HORMONE: CPT

## 2020-06-11 PROCEDURE — 84439 ASSAY OF FREE THYROXINE: CPT

## 2020-07-14 ENCOUNTER — LAB VISIT (OUTPATIENT)
Dept: LAB | Facility: HOSPITAL | Age: 80
End: 2020-07-14
Attending: INTERNAL MEDICINE
Payer: MEDICARE

## 2020-07-14 DIAGNOSIS — I51.9 MYXEDEMA HEART DISEASE: Primary | ICD-10-CM

## 2020-07-14 DIAGNOSIS — E03.9 MYXEDEMA HEART DISEASE: Primary | ICD-10-CM

## 2020-07-14 LAB
T4 FREE SERPL-MCNC: 1.22 NG/DL (ref 0.71–1.51)
TSH SERPL DL<=0.005 MIU/L-ACNC: 0.14 UIU/ML (ref 0.34–5.6)

## 2020-07-14 PROCEDURE — 84443 ASSAY THYROID STIM HORMONE: CPT

## 2020-07-14 PROCEDURE — 36415 COLL VENOUS BLD VENIPUNCTURE: CPT

## 2020-07-14 PROCEDURE — 84439 ASSAY OF FREE THYROXINE: CPT

## 2020-08-04 ENCOUNTER — LAB VISIT (OUTPATIENT)
Dept: LAB | Facility: HOSPITAL | Age: 80
End: 2020-08-04
Attending: INTERNAL MEDICINE
Payer: MEDICARE

## 2020-08-04 DIAGNOSIS — I50.31 ACUTE DIASTOLIC HEART FAILURE: ICD-10-CM

## 2020-08-04 DIAGNOSIS — E11.9 DIABETES MELLITUS WITHOUT COMPLICATION: Primary | ICD-10-CM

## 2020-08-04 LAB
ANION GAP SERPL CALC-SCNC: 9 MMOL/L (ref 8–16)
BUN SERPL-MCNC: 9 MG/DL (ref 8–23)
CALCIUM SERPL-MCNC: 8.5 MG/DL (ref 8.7–10.5)
CHLORIDE SERPL-SCNC: 108 MMOL/L (ref 95–110)
CO2 SERPL-SCNC: 22 MMOL/L (ref 23–29)
CREAT SERPL-MCNC: 1.3 MG/DL (ref 0.5–1.4)
EST. GFR  (AFRICAN AMERICAN): 45.1 ML/MIN/1.73 M^2
EST. GFR  (NON AFRICAN AMERICAN): 39.1 ML/MIN/1.73 M^2
GLUCOSE SERPL-MCNC: 80 MG/DL (ref 70–110)
POTASSIUM SERPL-SCNC: 3.6 MMOL/L (ref 3.5–5.1)
SODIUM SERPL-SCNC: 139 MMOL/L (ref 136–145)

## 2020-08-04 PROCEDURE — 83036 HEMOGLOBIN GLYCOSYLATED A1C: CPT

## 2020-08-04 PROCEDURE — 80048 BASIC METABOLIC PNL TOTAL CA: CPT

## 2020-08-04 PROCEDURE — 36415 COLL VENOUS BLD VENIPUNCTURE: CPT

## 2020-08-04 PROCEDURE — 82607 VITAMIN B-12: CPT

## 2020-08-05 LAB
ESTIMATED AVG GLUCOSE: 114 MG/DL (ref 68–131)
HBA1C MFR BLD HPLC: 5.6 % (ref 4.5–6.2)
VIT B12 SERPL-MCNC: 644 PG/ML (ref 210–950)

## 2020-09-23 ENCOUNTER — LAB VISIT (OUTPATIENT)
Dept: LAB | Facility: HOSPITAL | Age: 80
End: 2020-09-23
Attending: INTERNAL MEDICINE
Payer: MEDICARE

## 2020-09-23 DIAGNOSIS — I25.10 CORONARY ATHEROSCLEROSIS OF NATIVE CORONARY ARTERY: Primary | ICD-10-CM

## 2020-09-23 LAB
ALBUMIN SERPL BCP-MCNC: 3.1 G/DL (ref 3.5–5.2)
ALP SERPL-CCNC: 82 U/L (ref 55–135)
ALT SERPL W/O P-5'-P-CCNC: 5 U/L (ref 10–44)
AST SERPL-CCNC: 13 U/L (ref 10–40)
BILIRUB DIRECT SERPL-MCNC: 0.2 MG/DL (ref 0.1–0.3)
BILIRUB SERPL-MCNC: 0.5 MG/DL (ref 0.1–1)
CHOLEST SERPL-MCNC: 163 MG/DL (ref 120–199)
CHOLEST/HDLC SERPL: 4.8 {RATIO} (ref 2–5)
HDLC SERPL-MCNC: 34 MG/DL (ref 40–75)
HDLC SERPL: 20.9 % (ref 20–50)
LDLC SERPL CALC-MCNC: 99.4 MG/DL (ref 63–159)
NONHDLC SERPL-MCNC: 129 MG/DL
PROT SERPL-MCNC: 6.6 G/DL (ref 6–8.4)
TRIGL SERPL-MCNC: 148 MG/DL (ref 30–150)

## 2020-09-23 PROCEDURE — 36415 COLL VENOUS BLD VENIPUNCTURE: CPT

## 2020-09-23 PROCEDURE — 80061 LIPID PANEL: CPT

## 2020-09-23 PROCEDURE — 80076 HEPATIC FUNCTION PANEL: CPT

## 2020-10-09 ENCOUNTER — LAB VISIT (OUTPATIENT)
Dept: LAB | Facility: HOSPITAL | Age: 80
End: 2020-10-09
Attending: INTERNAL MEDICINE
Payer: MEDICARE

## 2020-10-09 DIAGNOSIS — E03.9 MYXEDEMA HEART DISEASE: Primary | ICD-10-CM

## 2020-10-09 DIAGNOSIS — I51.9 MYXEDEMA HEART DISEASE: Primary | ICD-10-CM

## 2020-10-09 LAB
T4 FREE SERPL-MCNC: 0.94 NG/DL (ref 0.71–1.51)
TSH SERPL DL<=0.005 MIU/L-ACNC: 7.69 UIU/ML (ref 0.34–5.6)

## 2020-10-09 PROCEDURE — 84443 ASSAY THYROID STIM HORMONE: CPT

## 2020-10-09 PROCEDURE — 36415 COLL VENOUS BLD VENIPUNCTURE: CPT

## 2020-10-09 PROCEDURE — 84439 ASSAY OF FREE THYROXINE: CPT

## 2020-11-11 ENCOUNTER — LAB VISIT (OUTPATIENT)
Dept: LAB | Facility: HOSPITAL | Age: 80
End: 2020-11-11
Attending: INTERNAL MEDICINE
Payer: MEDICARE

## 2020-11-11 DIAGNOSIS — E03.9 MYXEDEMA HEART DISEASE: ICD-10-CM

## 2020-11-11 DIAGNOSIS — I51.9 MYXEDEMA HEART DISEASE: ICD-10-CM

## 2020-11-11 DIAGNOSIS — E11.9 DIABETES MELLITUS WITHOUT COMPLICATION: Primary | ICD-10-CM

## 2020-11-11 DIAGNOSIS — I50.31 ACUTE DIASTOLIC HEART FAILURE: ICD-10-CM

## 2020-11-11 LAB
ANION GAP SERPL CALC-SCNC: 10 MMOL/L (ref 8–16)
BUN SERPL-MCNC: 23 MG/DL (ref 8–23)
CALCIUM SERPL-MCNC: 9.6 MG/DL (ref 8.7–10.5)
CHLORIDE SERPL-SCNC: 105 MMOL/L (ref 95–110)
CO2 SERPL-SCNC: 25 MMOL/L (ref 23–29)
CREAT SERPL-MCNC: 1.8 MG/DL (ref 0.5–1.4)
EST. GFR  (AFRICAN AMERICAN): 30.4 ML/MIN/1.73 M^2
EST. GFR  (NON AFRICAN AMERICAN): 26.4 ML/MIN/1.73 M^2
ESTIMATED AVG GLUCOSE: 128 MG/DL (ref 68–131)
GLUCOSE SERPL-MCNC: 99 MG/DL (ref 70–110)
HBA1C MFR BLD HPLC: 6.1 % (ref 4.5–6.2)
POTASSIUM SERPL-SCNC: 4 MMOL/L (ref 3.5–5.1)
SODIUM SERPL-SCNC: 140 MMOL/L (ref 136–145)
TSH SERPL DL<=0.005 MIU/L-ACNC: 3.96 UIU/ML (ref 0.34–5.6)

## 2020-11-11 PROCEDURE — 80048 BASIC METABOLIC PNL TOTAL CA: CPT

## 2020-11-11 PROCEDURE — 83036 HEMOGLOBIN GLYCOSYLATED A1C: CPT

## 2020-11-11 PROCEDURE — 84443 ASSAY THYROID STIM HORMONE: CPT

## 2020-11-11 PROCEDURE — 36415 COLL VENOUS BLD VENIPUNCTURE: CPT

## 2021-03-13 ENCOUNTER — APPOINTMENT (OUTPATIENT)
Dept: LAB | Facility: HOSPITAL | Age: 81
End: 2021-03-13
Attending: INTERNAL MEDICINE
Payer: MEDICARE

## 2021-03-13 DIAGNOSIS — N39.0 URINARY TRACT INFECTION, SITE NOT SPECIFIED: Primary | ICD-10-CM

## 2021-03-13 LAB
BACTERIA #/AREA URNS HPF: ABNORMAL /HPF
BILIRUB UR QL STRIP: NEGATIVE
CLARITY UR: CLEAR
COLOR UR: YELLOW
GLUCOSE UR QL STRIP: NEGATIVE
HGB UR QL STRIP: NEGATIVE
KETONES UR QL STRIP: NEGATIVE
LEUKOCYTE ESTERASE UR QL STRIP: ABNORMAL
MICROSCOPIC COMMENT: ABNORMAL
NITRITE UR QL STRIP: NEGATIVE
PH UR STRIP: 7 [PH] (ref 5–8)
PROT UR QL STRIP: NEGATIVE
RBC #/AREA URNS HPF: 5 /HPF (ref 0–4)
SP GR UR STRIP: 1.02 (ref 1–1.03)
URN SPEC COLLECT METH UR: ABNORMAL
UROBILINOGEN UR STRIP-ACNC: NEGATIVE EU/DL
WBC #/AREA URNS HPF: 40 /HPF (ref 0–5)

## 2021-03-13 PROCEDURE — 81000 URINALYSIS NONAUTO W/SCOPE: CPT | Performed by: INTERNAL MEDICINE

## 2021-03-13 PROCEDURE — 87186 SC STD MICRODIL/AGAR DIL: CPT | Performed by: INTERNAL MEDICINE

## 2021-03-13 PROCEDURE — 87086 URINE CULTURE/COLONY COUNT: CPT | Performed by: INTERNAL MEDICINE

## 2021-03-13 PROCEDURE — 87077 CULTURE AEROBIC IDENTIFY: CPT | Performed by: INTERNAL MEDICINE

## 2021-03-13 PROCEDURE — 87088 URINE BACTERIA CULTURE: CPT | Performed by: INTERNAL MEDICINE

## 2021-03-16 LAB — BACTERIA UR CULT: ABNORMAL

## 2021-05-08 ENCOUNTER — APPOINTMENT (OUTPATIENT)
Dept: LAB | Facility: HOSPITAL | Age: 81
End: 2021-05-08
Attending: INTERNAL MEDICINE
Payer: MEDICARE

## 2021-05-08 DIAGNOSIS — N39.0 URINARY TRACT INFECTION, SITE NOT SPECIFIED: Primary | ICD-10-CM

## 2021-05-08 LAB
BILIRUB UR QL STRIP: NEGATIVE
CLARITY UR: CLEAR
COLOR UR: YELLOW
GLUCOSE UR QL STRIP: NEGATIVE
HGB UR QL STRIP: NEGATIVE
KETONES UR QL STRIP: NEGATIVE
LEUKOCYTE ESTERASE UR QL STRIP: NEGATIVE
NITRITE UR QL STRIP: NEGATIVE
PH UR STRIP: 6 [PH] (ref 5–8)
PROT UR QL STRIP: NEGATIVE
SP GR UR STRIP: 1.02 (ref 1–1.03)
URN SPEC COLLECT METH UR: NORMAL
UROBILINOGEN UR STRIP-ACNC: NEGATIVE EU/DL

## 2021-05-08 PROCEDURE — 87086 URINE CULTURE/COLONY COUNT: CPT | Performed by: INTERNAL MEDICINE

## 2021-05-08 PROCEDURE — 81003 URINALYSIS AUTO W/O SCOPE: CPT | Performed by: INTERNAL MEDICINE

## 2021-05-09 LAB
BACTERIA UR CULT: NORMAL
BACTERIA UR CULT: NORMAL

## 2022-03-23 NOTE — ASSESSMENT & PLAN NOTE
Continue Iv ceftriaxone daily  Follow urine culture     The patient has been examined and the H&P has been reviewed:    I concur with the findings and no changes have occurred since H&P was written.    Procedure risks, benefits and alternative options discussed and understood by patient/family.          There are no hospital problems to display for this patient.

## (undated) DEVICE — Device

## (undated) DEVICE — CATHETER GUIDE LAUNCHER JR4 5FX110

## (undated) DEVICE — CATHETER RADIAL TIG 4.0 OPTITORQUE 5X110

## (undated) DEVICE — GUIDEWIRE CHOICE PT 0.014X182CM

## (undated) DEVICE — HEMOSTAT VASC BAND REG 24CM

## (undated) DEVICE — GUIDEWIRE J TIP BMW .014X190

## (undated) DEVICE — SHEATH INTRODUCER SLENDER 6FX10CM